# Patient Record
Sex: MALE | Race: WHITE | NOT HISPANIC OR LATINO
[De-identification: names, ages, dates, MRNs, and addresses within clinical notes are randomized per-mention and may not be internally consistent; named-entity substitution may affect disease eponyms.]

---

## 2018-01-09 ENCOUNTER — APPOINTMENT (OUTPATIENT)
Dept: INTERNAL MEDICINE | Facility: CLINIC | Age: 68
End: 2018-01-09
Payer: MEDICARE

## 2018-01-09 ENCOUNTER — NON-APPOINTMENT (OUTPATIENT)
Age: 68
End: 2018-01-09

## 2018-01-09 VITALS
SYSTOLIC BLOOD PRESSURE: 140 MMHG | OXYGEN SATURATION: 98 % | DIASTOLIC BLOOD PRESSURE: 80 MMHG | WEIGHT: 211.38 LBS | HEIGHT: 69 IN | BODY MASS INDEX: 31.31 KG/M2 | HEART RATE: 80 BPM

## 2018-01-09 DIAGNOSIS — R07.89 OTHER CHEST PAIN: ICD-10-CM

## 2018-01-09 DIAGNOSIS — Z28.21 IMMUNIZATION NOT CARRIED OUT BECAUSE OF PATIENT REFUSAL: ICD-10-CM

## 2018-01-09 DIAGNOSIS — Z71.89 OTHER SPECIFIED COUNSELING: ICD-10-CM

## 2018-01-09 PROCEDURE — G0439: CPT

## 2018-01-09 PROCEDURE — G0404: CPT

## 2018-01-09 PROCEDURE — 99497 ADVNCD CARE PLAN 30 MIN: CPT

## 2018-01-25 ENCOUNTER — OUTPATIENT (OUTPATIENT)
Dept: OUTPATIENT SERVICES | Facility: HOSPITAL | Age: 68
LOS: 1 days | End: 2018-01-25
Payer: MEDICARE

## 2018-01-25 DIAGNOSIS — Z96.642 PRESENCE OF LEFT ARTIFICIAL HIP JOINT: Chronic | ICD-10-CM

## 2018-01-25 DIAGNOSIS — H33.053 TOTAL RETINAL DETACHMENT, BILATERAL: Chronic | ICD-10-CM

## 2018-01-25 DIAGNOSIS — R07.89 OTHER CHEST PAIN: ICD-10-CM

## 2018-01-25 DIAGNOSIS — Z90.49 ACQUIRED ABSENCE OF OTHER SPECIFIED PARTS OF DIGESTIVE TRACT: Chronic | ICD-10-CM

## 2018-01-25 LAB
25(OH)D3 SERPL-MCNC: 31.9 NG/ML
ALBUMIN SERPL ELPH-MCNC: 4.3 G/DL
ALP BLD-CCNC: 91 U/L
ALT SERPL-CCNC: 40 U/L
ANION GAP SERPL CALC-SCNC: 15 MMOL/L
APPEARANCE: CLEAR
AST SERPL-CCNC: 40 U/L
BACTERIA: NEGATIVE
BASOPHILS # BLD AUTO: 0.03 K/UL
BASOPHILS NFR BLD AUTO: 0.5 %
BILIRUB SERPL-MCNC: 0.3 MG/DL
BILIRUBIN URINE: NEGATIVE
BLOOD URINE: NEGATIVE
BUN SERPL-MCNC: 18 MG/DL
CALCIUM SERPL-MCNC: 9.3 MG/DL
CHLORIDE SERPL-SCNC: 102 MMOL/L
CHOLEST SERPL-MCNC: 187 MG/DL
CHOLEST/HDLC SERPL: 4.8 RATIO
CO2 SERPL-SCNC: 21 MMOL/L
COLOR: YELLOW
CREAT SERPL-MCNC: 1.01 MG/DL
EOSINOPHIL # BLD AUTO: 0.1 K/UL
EOSINOPHIL NFR BLD AUTO: 1.8 %
GLUCOSE QUALITATIVE U: NEGATIVE MG/DL
GLUCOSE SERPL-MCNC: 112 MG/DL
HBA1C MFR BLD HPLC: 6.4 %
HCT VFR BLD CALC: 44.4 %
HDLC SERPL-MCNC: 39 MG/DL
HGB BLD-MCNC: 14.9 G/DL
IMM GRANULOCYTES NFR BLD AUTO: 0.4 %
KETONES URINE: NEGATIVE
LDLC SERPL CALC-MCNC: 108 MG/DL
LEUKOCYTE ESTERASE URINE: NEGATIVE
LYMPHOCYTES # BLD AUTO: 1.88 K/UL
LYMPHOCYTES NFR BLD AUTO: 33 %
MAN DIFF?: NORMAL
MCHC RBC-ENTMCNC: 30 PG
MCHC RBC-ENTMCNC: 33.6 GM/DL
MCV RBC AUTO: 89.3 FL
MICROSCOPIC-UA: NORMAL
MONOCYTES # BLD AUTO: 0.76 K/UL
MONOCYTES NFR BLD AUTO: 13.4 %
NEUTROPHILS # BLD AUTO: 2.9 K/UL
NEUTROPHILS NFR BLD AUTO: 50.9 %
NITRITE URINE: NEGATIVE
PH URINE: 5
PLATELET # BLD AUTO: 227 K/UL
POTASSIUM SERPL-SCNC: 4.5 MMOL/L
PROT SERPL-MCNC: 7.5 G/DL
PROTEIN URINE: NEGATIVE MG/DL
PSA SERPL-MCNC: 3.54 NG/ML
RBC # BLD: 4.97 M/UL
RBC # FLD: 13.4 %
RED BLOOD CELLS URINE: 1 /HPF
SODIUM SERPL-SCNC: 138 MMOL/L
SPECIFIC GRAVITY URINE: 1.02
SQUAMOUS EPITHELIAL CELLS: 0 /HPF
TRIGL SERPL-MCNC: 199 MG/DL
TSH SERPL-ACNC: 2.67 UIU/ML
UROBILINOGEN URINE: NEGATIVE MG/DL
WBC # FLD AUTO: 5.69 K/UL
WHITE BLOOD CELLS URINE: 2 /HPF

## 2018-01-25 PROCEDURE — 93017 CV STRESS TEST TRACING ONLY: CPT

## 2018-01-25 PROCEDURE — 93016 CV STRESS TEST SUPVJ ONLY: CPT

## 2018-01-25 PROCEDURE — 93018 CV STRESS TEST I&R ONLY: CPT

## 2018-09-13 ENCOUNTER — MEDICATION RENEWAL (OUTPATIENT)
Age: 68
End: 2018-09-13

## 2018-11-16 ENCOUNTER — INPATIENT (INPATIENT)
Facility: HOSPITAL | Age: 68
LOS: 2 days | Discharge: HOME | DRG: 312 | End: 2018-11-19
Attending: INTERNAL MEDICINE | Admitting: HOSPITALIST
Payer: MEDICARE

## 2018-11-16 ENCOUNTER — APPOINTMENT (OUTPATIENT)
Dept: INTERNAL MEDICINE | Facility: CLINIC | Age: 68
End: 2018-11-16

## 2018-11-16 VITALS
RESPIRATION RATE: 20 BRPM | TEMPERATURE: 97 F | HEART RATE: 60 BPM | WEIGHT: 184.97 LBS | SYSTOLIC BLOOD PRESSURE: 82 MMHG | DIASTOLIC BLOOD PRESSURE: 48 MMHG | OXYGEN SATURATION: 99 %

## 2018-11-16 DIAGNOSIS — Z90.49 ACQUIRED ABSENCE OF OTHER SPECIFIED PARTS OF DIGESTIVE TRACT: Chronic | ICD-10-CM

## 2018-11-16 DIAGNOSIS — Z96.641 PRESENCE OF RIGHT ARTIFICIAL HIP JOINT: Chronic | ICD-10-CM

## 2018-11-16 DIAGNOSIS — H33.053 TOTAL RETINAL DETACHMENT, BILATERAL: Chronic | ICD-10-CM

## 2018-11-16 DIAGNOSIS — E86.0 DEHYDRATION: ICD-10-CM

## 2018-11-16 DIAGNOSIS — R55 SYNCOPE AND COLLAPSE: ICD-10-CM

## 2018-11-16 DIAGNOSIS — I10 ESSENTIAL (PRIMARY) HYPERTENSION: ICD-10-CM

## 2018-11-16 DIAGNOSIS — Z96.642 PRESENCE OF LEFT ARTIFICIAL HIP JOINT: Chronic | ICD-10-CM

## 2018-11-16 LAB
ALBUMIN SERPL ELPH-MCNC: 3.5 G/DL — SIGNIFICANT CHANGE UP (ref 3.3–5)
ALP SERPL-CCNC: 88 U/L — SIGNIFICANT CHANGE UP (ref 40–120)
ALT FLD-CCNC: 67 U/L DA — HIGH (ref 10–45)
ANION GAP SERPL CALC-SCNC: 14 MMOL/L — SIGNIFICANT CHANGE UP (ref 5–17)
APPEARANCE UR: CLEAR — SIGNIFICANT CHANGE UP
APTT BLD: 31 SEC — SIGNIFICANT CHANGE UP (ref 27.5–36.3)
AST SERPL-CCNC: 32 U/L — SIGNIFICANT CHANGE UP (ref 10–40)
BACTERIA # UR AUTO: NEGATIVE /HPF — SIGNIFICANT CHANGE UP
BILIRUB SERPL-MCNC: 0.8 MG/DL — SIGNIFICANT CHANGE UP (ref 0.2–1.2)
BILIRUB UR-MCNC: ABNORMAL
BUN SERPL-MCNC: 37 MG/DL — HIGH (ref 7–23)
CALCIUM SERPL-MCNC: 8.4 MG/DL — SIGNIFICANT CHANGE UP (ref 8.4–10.5)
CHLORIDE SERPL-SCNC: 100 MMOL/L — SIGNIFICANT CHANGE UP (ref 96–108)
CK SERPL-CCNC: 255 U/L — HIGH (ref 30–200)
CO2 SERPL-SCNC: 23 MMOL/L — SIGNIFICANT CHANGE UP (ref 22–31)
COLOR SPEC: YELLOW — SIGNIFICANT CHANGE UP
CREAT SERPL-MCNC: 2.5 MG/DL — HIGH (ref 0.5–1.3)
DIFF PNL FLD: ABNORMAL
EPI CELLS # UR: SIGNIFICANT CHANGE UP
FLUAV SPEC QL CULT: NEGATIVE — SIGNIFICANT CHANGE UP
FLUBV AG SPEC QL IA: NEGATIVE — SIGNIFICANT CHANGE UP
GLUCOSE SERPL-MCNC: 146 MG/DL — HIGH (ref 70–99)
GLUCOSE UR QL: NEGATIVE — SIGNIFICANT CHANGE UP
HCT VFR BLD CALC: 43.7 % — SIGNIFICANT CHANGE UP (ref 39–50)
HGB BLD-MCNC: 14.5 G/DL — SIGNIFICANT CHANGE UP (ref 13–17)
HYALINE CASTS # UR AUTO: ABNORMAL (ref 0–7)
INR BLD: 1.17 RATIO — HIGH (ref 0.88–1.16)
KETONES UR-MCNC: ABNORMAL
LACTATE SERPL-SCNC: 2 MMOL/L — SIGNIFICANT CHANGE UP (ref 0.7–2)
LEUKOCYTE ESTERASE UR-ACNC: ABNORMAL
MCHC RBC-ENTMCNC: 29.2 PG — SIGNIFICANT CHANGE UP (ref 27–34)
MCHC RBC-ENTMCNC: 33.2 GM/DL — SIGNIFICANT CHANGE UP (ref 32–36)
MCV RBC AUTO: 87.9 FL — SIGNIFICANT CHANGE UP (ref 80–100)
NITRITE UR-MCNC: NEGATIVE — SIGNIFICANT CHANGE UP
NT-PROBNP SERPL-SCNC: 459 PG/ML — HIGH (ref 0–300)
PH UR: 5 — SIGNIFICANT CHANGE UP (ref 5–8)
PLATELET # BLD AUTO: 152 K/UL — SIGNIFICANT CHANGE UP (ref 150–400)
POTASSIUM SERPL-MCNC: 3.8 MMOL/L — SIGNIFICANT CHANGE UP (ref 3.5–5.3)
POTASSIUM SERPL-SCNC: 3.8 MMOL/L — SIGNIFICANT CHANGE UP (ref 3.5–5.3)
PROT SERPL-MCNC: 7.5 G/DL — SIGNIFICANT CHANGE UP (ref 6–8.3)
PROT UR-MCNC: 100
PROTHROM AB SERPL-ACNC: 13.2 SEC — HIGH (ref 10–12.9)
RBC # BLD: 4.98 M/UL — SIGNIFICANT CHANGE UP (ref 4.2–5.8)
RBC # FLD: 12.3 % — SIGNIFICANT CHANGE UP (ref 10.3–14.5)
RBC CASTS # UR COMP ASSIST: ABNORMAL /HPF (ref 0–4)
SODIUM SERPL-SCNC: 137 MMOL/L — SIGNIFICANT CHANGE UP (ref 135–145)
SP GR SPEC: 1.02 — SIGNIFICANT CHANGE UP (ref 1.01–1.02)
TROPONIN I SERPL-MCNC: <.017 NG/ML — LOW (ref 0.02–0.06)
TROPONIN I SERPL-MCNC: <.017 NG/ML — LOW (ref 0.02–0.06)
UROBILINOGEN FLD QL: 1
WBC # BLD: 13.1 K/UL — HIGH (ref 3.8–10.5)
WBC # FLD AUTO: 13.1 K/UL — HIGH (ref 3.8–10.5)
WBC UR QL: SIGNIFICANT CHANGE UP /HPF (ref 0–5)

## 2018-11-16 PROCEDURE — 99285 EMERGENCY DEPT VISIT HI MDM: CPT

## 2018-11-16 PROCEDURE — 71045 X-RAY EXAM CHEST 1 VIEW: CPT | Mod: 26

## 2018-11-16 PROCEDURE — 93010 ELECTROCARDIOGRAM REPORT: CPT

## 2018-11-16 PROCEDURE — 70450 CT HEAD/BRAIN W/O DYE: CPT | Mod: 26

## 2018-11-16 PROCEDURE — 99222 1ST HOSP IP/OBS MODERATE 55: CPT | Mod: AI

## 2018-11-16 RX ORDER — SODIUM CHLORIDE 9 MG/ML
1000 INJECTION INTRAMUSCULAR; INTRAVENOUS; SUBCUTANEOUS ONCE
Qty: 0 | Refills: 0 | Status: COMPLETED | OUTPATIENT
Start: 2018-11-16 | End: 2018-11-16

## 2018-11-16 RX ORDER — CEFTRIAXONE 500 MG/1
1 INJECTION, POWDER, FOR SOLUTION INTRAMUSCULAR; INTRAVENOUS ONCE
Qty: 0 | Refills: 0 | Status: COMPLETED | OUTPATIENT
Start: 2018-11-16 | End: 2018-11-16

## 2018-11-16 RX ORDER — CEFTRIAXONE 500 MG/1
1 INJECTION, POWDER, FOR SOLUTION INTRAMUSCULAR; INTRAVENOUS EVERY 24 HOURS
Qty: 0 | Refills: 0 | Status: DISCONTINUED | OUTPATIENT
Start: 2018-11-16 | End: 2018-11-17

## 2018-11-16 RX ORDER — LISINOPRIL 2.5 MG/1
5 TABLET ORAL DAILY
Qty: 0 | Refills: 0 | Status: DISCONTINUED | OUTPATIENT
Start: 2018-11-16 | End: 2018-11-16

## 2018-11-16 RX ORDER — INFLUENZA VIRUS VACCINE 15; 15; 15; 15 UG/.5ML; UG/.5ML; UG/.5ML; UG/.5ML
0.5 SUSPENSION INTRAMUSCULAR ONCE
Qty: 0 | Refills: 0 | Status: COMPLETED | OUTPATIENT
Start: 2018-11-16 | End: 2018-11-19

## 2018-11-16 RX ORDER — SODIUM CHLORIDE 9 MG/ML
1000 INJECTION INTRAMUSCULAR; INTRAVENOUS; SUBCUTANEOUS
Qty: 0 | Refills: 0 | Status: DISCONTINUED | OUTPATIENT
Start: 2018-11-16 | End: 2018-11-18

## 2018-11-16 RX ORDER — ENOXAPARIN SODIUM 100 MG/ML
40 INJECTION SUBCUTANEOUS EVERY 24 HOURS
Qty: 0 | Refills: 0 | Status: DISCONTINUED | OUTPATIENT
Start: 2018-11-16 | End: 2018-11-19

## 2018-11-16 RX ORDER — BACITRACIN ZINC 500 UNIT/G
1 OINTMENT IN PACKET (EA) TOPICAL THREE TIMES A DAY
Qty: 0 | Refills: 0 | Status: DISCONTINUED | OUTPATIENT
Start: 2018-11-16 | End: 2018-11-19

## 2018-11-16 RX ADMIN — ENOXAPARIN SODIUM 40 MILLIGRAM(S): 100 INJECTION SUBCUTANEOUS at 21:54

## 2018-11-16 RX ADMIN — SODIUM CHLORIDE 2000 MILLILITER(S): 9 INJECTION INTRAMUSCULAR; INTRAVENOUS; SUBCUTANEOUS at 15:35

## 2018-11-16 RX ADMIN — CEFTRIAXONE 100 GRAM(S): 500 INJECTION, POWDER, FOR SOLUTION INTRAMUSCULAR; INTRAVENOUS at 16:00

## 2018-11-16 RX ADMIN — SODIUM CHLORIDE 1000 MILLILITER(S): 9 INJECTION INTRAMUSCULAR; INTRAVENOUS; SUBCUTANEOUS at 14:50

## 2018-11-16 RX ADMIN — Medication 1 APPLICATION(S): at 21:54

## 2018-11-16 RX ADMIN — SODIUM CHLORIDE 1000 MILLILITER(S): 9 INJECTION INTRAMUSCULAR; INTRAVENOUS; SUBCUTANEOUS at 15:05

## 2018-11-16 RX ADMIN — Medication 100 MILLIGRAM(S): at 16:31

## 2018-11-16 RX ADMIN — SODIUM CHLORIDE 2000 MILLILITER(S): 9 INJECTION INTRAMUSCULAR; INTRAVENOUS; SUBCUTANEOUS at 14:35

## 2018-11-16 RX ADMIN — SODIUM CHLORIDE 2000 MILLILITER(S): 9 INJECTION INTRAMUSCULAR; INTRAVENOUS; SUBCUTANEOUS at 14:20

## 2018-11-16 RX ADMIN — SODIUM CHLORIDE 1000 MILLILITER(S): 9 INJECTION INTRAMUSCULAR; INTRAVENOUS; SUBCUTANEOUS at 16:05

## 2018-11-16 RX ADMIN — CEFTRIAXONE 1 GRAM(S): 500 INJECTION, POWDER, FOR SOLUTION INTRAMUSCULAR; INTRAVENOUS at 16:30

## 2018-11-16 NOTE — ED PROVIDER NOTE - ATTENDING CONTRIBUTION TO CARE
· Chief Complaint: The patient is a 44y Female complaining of abnormal lab result.  · HPI Objective Statement: 45 y/o F with h/o Thrombocytopenia, Pancreatic CA undergoing chemo presenting with chest pain-  did a cta chest done today revealing (+) pulmonary embolism. Denies abdominal pain, nausea, vomiting, fever, chills, shortness of breath. Does states recent nose bleed. Appears well. No distress.  	PMD- Lindy  Heme/Onc- Star    · Presenting Symptoms: CHEST PAIN  · Negative Findings: no back pain  · Location: sternal region  · Radiation: no radiation  · Timing: waxing and waning  · Duration: today  · Quality: aching  · Severity: PAIN SCALE 7 OF 10.  · Context: hx of pancreatic cancer  · Aggravated Factors: exertion  · Relieving Factors: rest  PE pt looked well NAD , bp recorded below normal mm dry   chest cta bilaterally abd soft NT + BS   Dr. Casper:  I have reviewed and discussed with the PA/ resident the case specifics, including the history, physical assessment, evaluation, conclusion, laboratory results, and medical plan. I agree with the contents, and conclusions. I have personally examined, and interviewed the patient. cc syncopy three times in pmd office , bp recorded low in ed  PE pt looked well NAD , bp recorded below normal mm dry   chest cta bilaterally abd soft NT + BS   Dr. Casper:  I have reviewed and discussed with the PA/ resident the case specifics, including the history, physical assessment, evaluation, conclusion, laboratory results, and medical plan. I agree with the contents, and conclusions. I have personally examined, and interviewed the patient.

## 2018-11-16 NOTE — H&P ADULT - PROBLEM SELECTOR PLAN 1
F/U head CT results  Monitor on telemetry for arrhythmias  Cardiology and neuro evaluation pending  check Troponins x 3, echocardiogram, carotid sono R/O stenosis

## 2018-11-16 NOTE — ED PROVIDER NOTE - MEDICAL DECISION MAKING DETAILS
69 y/o M with h/o HTN, B/L hip replacements presents s/p multiple syncopal episodes today accompanied with cough, "high" fever, sleeping and not eating and drinking x 2 days- Cardiac work up, sepsis work up, Flu swab, Labs with lactate, Troponin, Cardiac monitoring, EKG, CXr, UA Urine culture, Admit

## 2018-11-16 NOTE — H&P ADULT - NSHPLABSRESULTS_GEN_ALL_CORE
14.5   13.1  )-----------( 152      ( 2018 14:15 )             43.7     Lactate, Blood: 2.0 mmol/L ( @ 14:15)        137  |  100  |  37  ----------------------------<  146  3.8   |  23  |  2.50    Ca    8.4      2018 14:15    TPro  7.5  /  Alb  3.5  /  TBili  0.8  /  DBili  x   /  AST  32  /  ALT  67  /  AlkPhos  88      PT/INR - ( 2018 14:15 )   PT: 13.2 sec;   INR: 1.17 ratio         PTT - ( 2018 14:15 )  PTT:31.0 sec  CARDIAC MARKERS ( 2018 14:15 )  <.017 ng/mL / x     / 255 U/L / x     / x                  Urinalysis Basic - ( 2018 16:15 )    Color: Yellow / Appearance: Clear / S.025 / pH: x  Gluc: x / Ketone: Small  / Bili: Small / Urobili: 1   Blood: x / Protein: 100 / Nitrite: Negative   Leuk Esterase: Trace / RBC: 5-10 /HPF / WBC 0-2 /HPF   Sq Epi: x / Non Sq Epi: Neg.-Few / Bacteria: Negative /HPF

## 2018-11-16 NOTE — H&P ADULT - ASSESSMENT
68 year-old male with PMH: HTN. Admitted with syncopal episodes x3 and one near syncopal episode. He reports sick contact with a friend whom he visited several times in a nursing/ rehabilitation facility over the past week prior. He reports having no heat in the house for a few days and when he woke up Wednesday morning (11/14) he felt feverish, fatigue, and was bedridden since then. He reports decreased appetite and felt dehydrated.

## 2018-11-16 NOTE — H&P ADULT - PSH
H/O total hip arthroplasty, right  Right hip replacement 2010  Recent retinal detachment, total, bilateral  1989 (left), 2006 (right)  S/P appendectomy    S/P hip replacement, left  2006

## 2018-11-16 NOTE — ED ADULT TRIAGE NOTE - CHIEF COMPLAINT QUOTE
syncope at home today x 2, went to Dr. Reyes office and synopsized in the parking lot.  sent to ED by Dr. Reyes

## 2018-11-16 NOTE — ED ADULT NURSE NOTE - OBJECTIVE STATEMENT
Pt presents via EMS from Dr. Reyes office with c/o 4 syncopal episodes today starting this morning all witnessed by wife. Per wife pt was sick with a fever and body aches since wednesday and was not eating or drinking fluids. Wife states that pt's falls were broken but that pt may have hit head and lost consciousness for 5-10 seconds each episode. Pt presents alert and oriented and states that he does not remember passing out. Per the wife pt had synopsized in the past.

## 2018-11-16 NOTE — H&P ADULT - ATTENDING COMMENTS
I have personally seen and examined patient on the above date.  I discussed the case with Lisa and I agree with findings and plan as detailed per note above, which I have amended where appropriate.    68M hx of HTN with febrile illness for the past 3 days with shaking chills, bedridden, no appetite s/p 4 syncopal episodes today while on way to PMD arrived in ED hypotensive resolved after IVFs. Pt feels well now. no further dizziness. tolerating po. Denied cough, sob, cp, nvd or dysuria. Noted pt had cont with Ramipril during febrile illness. no recent NSAIDs  VSS afebrile.   AP: syncope/collapse likely sec to dehydration, febrile illness with sepsis and YINKA  +wbc, +hypotension, +YINKA fulfills criteria for sepsis. CXR/UA neg  cont IV ceftriaxone for now until cxs flu/RVP resulted  agree with trend trops, ECHO, card/neuro to be complete  IVF for YINKA and dehydration. HOLD ACE until YINKA resolves. renal sono to be complete.

## 2018-11-16 NOTE — ED ADULT TRIAGE NOTE - PRO INTERPRETER NEED 2
Dx: L Glenohumeral arthritis, left (M19.012)  TSA 11/30/2017        Authorized # of Visits: Maida Powell MD visit: 02/26/2018  Fall Risk: standard         Precautions: n/a             Subjective: Patient states that he was unable to attend last week's s Shoulder PROM L shoulder abd (scaption) Shoulder PROM L shoulder abd (scaption)  PROM L shoulder abd (scaption) PROM L shoulder abd (scaption) Wall push up 2 x 10     ER in scapular plane PROM PROM ER in scapular plane PROM ER in scapular plane PROM L shou to increase passive range of motion in preparation for AAROM to AROM and RROM per TSA protocol, verbal cues to keep B arms at side during scapular retraction, B shoulder mini-extension and L shoulder ER at 0 deg. Charges:  Thera Ex 3 (45 min)        Tota English

## 2018-11-16 NOTE — H&P ADULT - HISTORY OF PRESENT ILLNESS
68 year-old male with PMH: HTN. Presents to the ED with multiple syncopal episodes. As per significant other-Cinthya, she witnessed pt hit his head in at least one episode and an other where she found him flat on the ground. Pt with recent fever and fatigue over last 2-3 days. Pt with no chest pain, palpitations dizziness, headaches prior to these episodes.

## 2018-11-16 NOTE — ED PROVIDER NOTE - OBJECTIVE STATEMENT
69 y/o M with h/o HTn B/L hip replacements presents s/p multiple syncopal episodes today.  Accompanied with wife who states patient has had a cough, "high" fever, sleeping and not eating and drinking x 2 days.  Stood up from bed this am and collapsed in front of the wife unconscious for 5 seconds no shaking. Then got up and walked to the bathroom "falling back into the door" and falling to the floor unconscious for 10-15 seconds no shaking. Put patient into the car and drove to PMD Dr. Reyes- as patient got out fo car in parking a lot again collapsed unconscious for 10 seconds with a "tremoring" sensation as per wife. Wife states prior episodes of syncope in "queezy situations" such as procedures.  Currently patient denies chest pain, shortness of breath, vomiting, numbness, tingling. No distress.   Allergy: Coumadin (increased bleeding)  No smoking hx 67 y/o M with h/o HTN B/L hip replacements presents s/p multiple witnessed syncopal episodes today.  Accompanied with wife who states patient has had a cough, "high" fever, sleeping and not eating and drinking x 2 days.  Stood up from bed this am and collapsed in front of the wife unconscious for 5 seconds no shaking. Then got up and walked to the bathroom "falling back into the door" and falling to the floor unconscious for 10-15 seconds no shaking. Put patient into the car and drove to PMD Dr. Reyes- as patient got out fo car in parking a lot again collapsed unconscious for 10 seconds with a "tremoring" sensation as per wife. Wife states prior episodes of syncope in "queezy situations" such as procedures.  Currently patient denies chest pain, shortness of breath, vomiting, numbness, tingling. No distress. Awake and alert answering question appropriately. No distress. Did not get a flu shot this year.   Allergy: Coumadin (increased bleeding)  No smoking hx

## 2018-11-16 NOTE — ED ADULT NURSE NOTE - NSIMPLEMENTINTERV_GEN_ALL_ED
Implemented All Fall Risk Interventions:  [place] to call system. Call bell, personal items and telephone within reach. Instruct patient to call for assistance. Room bathroom lighting operational. Non-slip footwear when patient is off stretcher. Physically safe environment: no spills, clutter or unnecessary equipment. Stretcher in lowest position, wheels locked, appropriate side rails in place. Provide visual cue, wrist band, yellow gown, etc. Monitor gait and stability. Monitor for mental status changes and reorient to person, place, and time. Review medications for side effects contributing to fall risk. Reinforce activity limits and safety measures with patient and family.

## 2018-11-16 NOTE — ED PROVIDER NOTE - CARE PLAN
Principal Discharge DX:	Syncope and collapse  Secondary Diagnosis:	Viral syndrome  Secondary Diagnosis:	Dehydration

## 2018-11-16 NOTE — H&P ADULT - NSHPREVIEWOFSYSTEMS_GEN_ALL_CORE
REVIEW OF SYSTEMS:  CONSTITUTIONAL: +fever, no weight loss, +fatigue, + dehydrated, + shaking shakes  EYES: No eye pain, visual disturbances, or discharge  ENMT:  No difficulty hearing, tinnitus, vertigo; No sinus or throat pain  NECK: No neck pain or neck stiffness  BREASTS: No pain, masses, or nipple discharge  RESPIRATORY: No cough, wheezing, chills or hemoptysis; No shortness of breath  CARDIOVASCULAR: No chest pain, palpitations, dizziness, or leg swelling  GASTROINTESTINAL: No abdominal pain, No nausea, vomiting, or hematemesis; No diarrhea or constipation  GENITOURINARY: No dysuria, frequency, hematuria, or incontinence  NEUROLOGICAL: No headaches, memory loss, loss of strength, numbness, or tremors  SKIN: No itching, burning, rashes, or lesions   LYMPH NODES: No enlarged glands  ENDOCRINE: No heat or cold intolerance; No hair loss  MUSCULOSKELETAL: No joint pain or swelling; No muscle, back, or extremity pain  PSYCHIATRIC: No depression, anxiety, mood swings, or difficulty sleeping  HEME/LYMPH: No easy bruising or bleeding  ALLERY AND IMMUNOLOGIC: No hives or eczema    All other ROS reviewed and negative except as otherwise stated

## 2018-11-16 NOTE — ED ADULT NURSE NOTE - PMH
Glaucoma    Hip pain, right    Hypertension    Macular degeneration of both eyes    Retinal detachments and defects  bilateral

## 2018-11-16 NOTE — ED ADULT NURSE NOTE - CHPI ED NUR SYMPTOMS NEG
no diaphoresis/no back pain/no chest pain/no congestion/no dizziness/no vomiting/no nausea/no shortness of breath

## 2018-11-16 NOTE — ED PROVIDER NOTE - PROGRESS NOTE DETAILS
PA Tiberio- rapid flu negative. CXR no pneumonia. WBC 13.1. Trop negative.  Creatinine elevated 2.5 likely related to dehydration. Covered with Rocephin and Doxy and given NS x 3 liters. Will admit for syncope work up. Accepted by hospitalist Dr. Thomas.

## 2018-11-16 NOTE — H&P ADULT - NSHPPHYSICALEXAM_GEN_ALL_CORE
PHYSICAL EXAM:  GENERAL: NAD, well-groomed, well-developed, AO X 3  HEAD:  Atraumatic, Normocephalic  EYES: EOMI, PERRLA, conjunctiva and sclera clear  ENMT: No tonsillar erythema, exudates, or enlargement; Moist mucous membranes, Good dentition  NECK: Supple, No JVD  CHEST/LUNG: Clear to auscultation bilaterally; No rales, rhonchi, wheezing, or rubs  HEART: Regular rate and rhythm; S1/S2, No murmurs, rubs, or gallops  ABDOMEN: Soft, Nontender, Nondistended; Bowel sounds present  VASCULAR: Normal pulses, Normal capillary refill  EXTREMITIES:  2+ Peripheral Pulses, No clubbing, cyanosis, or edema  LYMPH: No lymphadenopathy noted  SKIN: Warm, right elbow open bleeding abrasion, no swelling  PSYCH: Normal mood and affect  NERVOUS SYSTEM; CN 2-12 intact, No focal deficits

## 2018-11-17 LAB
ANION GAP SERPL CALC-SCNC: 7 MMOL/L — SIGNIFICANT CHANGE UP (ref 5–17)
BUN SERPL-MCNC: 25 MG/DL — HIGH (ref 7–23)
CALCIUM SERPL-MCNC: 7.8 MG/DL — LOW (ref 8.4–10.5)
CHLORIDE SERPL-SCNC: 110 MMOL/L — HIGH (ref 96–108)
CO2 SERPL-SCNC: 22 MMOL/L — SIGNIFICANT CHANGE UP (ref 22–31)
CREAT SERPL-MCNC: 1.05 MG/DL — SIGNIFICANT CHANGE UP (ref 0.5–1.3)
GLUCOSE SERPL-MCNC: 113 MG/DL — HIGH (ref 70–99)
HCT VFR BLD CALC: 36.6 % — LOW (ref 39–50)
HGB BLD-MCNC: 12.6 G/DL — LOW (ref 13–17)
LACTATE SERPL-SCNC: 2.3 MMOL/L — HIGH (ref 0.7–2)
MCHC RBC-ENTMCNC: 30.6 PG — SIGNIFICANT CHANGE UP (ref 27–34)
MCHC RBC-ENTMCNC: 34.6 GM/DL — SIGNIFICANT CHANGE UP (ref 32–36)
MCV RBC AUTO: 88.6 FL — SIGNIFICANT CHANGE UP (ref 80–100)
PLATELET # BLD AUTO: 128 K/UL — LOW (ref 150–400)
POTASSIUM SERPL-MCNC: 3.6 MMOL/L — SIGNIFICANT CHANGE UP (ref 3.5–5.3)
POTASSIUM SERPL-SCNC: 3.6 MMOL/L — SIGNIFICANT CHANGE UP (ref 3.5–5.3)
RBC # BLD: 4.12 M/UL — LOW (ref 4.2–5.8)
RBC # FLD: 12.2 % — SIGNIFICANT CHANGE UP (ref 10.3–14.5)
SODIUM SERPL-SCNC: 139 MMOL/L — SIGNIFICANT CHANGE UP (ref 135–145)
TROPONIN I SERPL-MCNC: <.017 NG/ML — LOW (ref 0.02–0.06)
WBC # BLD: 8 K/UL — SIGNIFICANT CHANGE UP (ref 3.8–10.5)
WBC # FLD AUTO: 8 K/UL — SIGNIFICANT CHANGE UP (ref 3.8–10.5)

## 2018-11-17 PROCEDURE — 76775 US EXAM ABDO BACK WALL LIM: CPT | Mod: 26

## 2018-11-17 PROCEDURE — 76770 US EXAM ABDO BACK WALL COMP: CPT | Mod: 26

## 2018-11-17 PROCEDURE — 99222 1ST HOSP IP/OBS MODERATE 55: CPT

## 2018-11-17 PROCEDURE — 93010 ELECTROCARDIOGRAM REPORT: CPT

## 2018-11-17 PROCEDURE — 93306 TTE W/DOPPLER COMPLETE: CPT | Mod: 26

## 2018-11-17 PROCEDURE — 93880 EXTRACRANIAL BILAT STUDY: CPT | Mod: 26

## 2018-11-17 PROCEDURE — 99233 SBSQ HOSP IP/OBS HIGH 50: CPT

## 2018-11-17 RX ADMIN — ENOXAPARIN SODIUM 40 MILLIGRAM(S): 100 INJECTION SUBCUTANEOUS at 21:15

## 2018-11-17 RX ADMIN — Medication 1 APPLICATION(S): at 06:03

## 2018-11-17 RX ADMIN — Medication 1 APPLICATION(S): at 21:15

## 2018-11-17 NOTE — CONSULT NOTE ADULT - SUBJECTIVE AND OBJECTIVE BOX
Chief Complaint:     68 year-old male with PMH: HTN. Presents to the ED with multiple syncopal episodes. As per significant other-Cinthya, she witnessed pt hit his head in at least one episode and an other where she found him flat on the ground. Pt with recent fever and fatigue over last 2-3 days. Pt with no chest pain, palpitations dizziness, headaches prior to these episodes.  HPI:    PMH:   Macular degeneration of both eyes  Retinal detachments and defects  Glaucoma  Hypertension  Hip pain, right    PSH:   H/O total hip arthroplasty, right  S/P appendectomy  Recent retinal detachment, total, bilateral  S/P hip replacement, left    Family History:  FAMILY HISTORY:  Family history of breast cancer in mother (Mother)      Social History:  Smoking:  Alcohol:  Drugs:    Allergies:  Coumadin (Other (Mod to Severe); [place]ushing)      Medications:  BACItracin   Ointment 1 Application(s) Topical three times a day  enoxaparin Injectable 40 milliGRAM(s) SubCutaneous every 24 hours  influenza   Vaccine 0.5 milliLiter(s) IntraMuscular once  sodium chloride 0.9%. 1000 milliLiter(s) IV Continuous <Continuous>      REVIEW OF SYSTEMS:  CONSTITUTIONAL: No fever, weight loss, or fatigue  EYES: No eye pain, visual disturbances, or discharge  ENMT:  No difficulty hearing, tinnitus, vertigo; No sinus or throat pain  NECK: No pain or stiffness  BREASTS: No pain, masses, or nipple discharge  RESPIRATORY: No cough, wheezing, chills or hemoptysis; No shortness of breath  CARDIOVASCULAR: No chest pain, palpitations, dizziness, or leg swelling  GASTROINTESTINAL: No abdominal or epigastric pain. No nausea, vomiting, or hematemesis; No diarrhea or constipation. No melena or hematochezia.  GENITOURINARY: No dysuria, frequency, hematuria, or incontinence  NEUROLOGICAL: No headaches, memory loss, loss of strength, numbness, or tremors  SKIN: No itching, burning, rashes, or lesions   LYMPH NODES: No enlarged glands  ENDOCRINE: No heat or cold intolerance; No hair loss  MUSCULOSKELETAL: No joint pain or swelling; No muscle, back, or extremity pain  PSYCHIATRIC: No depression, anxiety, mood swings, or difficulty sleeping  HEME/LYMPH: No easy bruising, or bleeding gums  ALLERY AND IMMUNOLOGIC: No hives or eczema    Physical Exam:  T(C): 36.4 (11-17-18 @ 09:38), Max: 36.5 (11-16-18 @ 16:16)  HR: 69 (11-17-18 @ 09:38) (60 - 71)  BP: 155/77 (11-17-18 @ 09:38) (82/48 - 155/78)  RR: 15 (11-17-18 @ 09:38) (15 - 20)  SpO2: 97% (11-17-18 @ 09:38) (97% - 99%)  Wt(kg): --    GENERAL: NAD, well-groomed, well-developed appears stated age  HEAD:  Atraumatic, Normocephalic  EYES: EOMI, conjunctiva and sclera clear  ENT: Moist mucous membranes,  NECK: Supple, No JVD, no bruits  CHEST/LUNG: Clear to percussion bilaterally; No rales, rhonchi, wheezing, or rubs  HEART: Regular rate and rhythm; No murmurs, rubs, or gallops PMI non displaced.  ABDOMEN: Soft, Nontender, Nondistended; Bowel sounds present  EXTREMITIES:  2+ Peripheral Pulses, No clubbing, cyanosis, or edema  SKIN: No rashes or lesions  NERVOUS SYSTEM:  Cranial Nerves II-XII intact     Cardiovascular Diagnostic Testing:  ECG:    < from: 12 Lead ECG (11.17.18 @ 06:55) >  Ventricular Rate 66 BPM    Atrial Rate 66 BPM    P-R Interval 216 ms    QRS Duration 100 ms    Q-T Interval 402 ms    QTC Calculation(Bezet) 421 ms    P Axis 58 degrees    R Axis 96 degrees    T Axis 14 degrees    Diagnosis Line Sinus rhythm with 1st degree AV block  Rightward axis  Nonspecific ST abnormality  Borderline ECG  When compared with ECG of 16-NOV-2018 14:00,  No significant change was found    Confirmed by LAI MAXWELL MD ([id number]) on 11/17/2018 8:31:39 AM    < end of copied text >      ECHO:    < from: TTE Echo Complete w/Doppler (11.17.18 @ 09:08) >    Summary:   1. Left ventricular ejection fraction, by visual estimation, is 55 to   60%.   2. Normal global left ventricular systolic function.   3. Spectral Doppler shows pseudonormal pattern of left ventricular   myocardial filling (Grade II diastolic dysfunction).   4. There is mild septal left ventricular hypertrophy.   5. Moderately enlarged right ventricle.   6. There is no evidence of pericardial effusion.   7. Thickening of the anterior and posterior mitral valve leaflets.   8. Mild tricuspid regurgitation.   9. Sclerotic aortic valve with normal opening.  10. Pulmonic valve regurgitation.  11. Structurally normal pulmonic valve, with normal leaflet excursion.  12. Estimated pulmonary artery systolic pressure is 55.5 mmHg assuming a   right atrial pressure of 10 mmHg, which is consistent with moderate   pulmonary hypertension.  13. The aortic valve mean gradient is 5.6 mmHg consistent with normally   openingaortic valve.    [id number] Lai Maxwell MD, [facility] , Electronically signed on 11/17/2018 at   11:52:06 AM       *** Final ***      LAI MAXWELL   This document has been electronically signed. Nov 17 2018  9:08AM    < end of copied text >      Labs:                        12.6   8.0   )-----------( 128      ( 17 Nov 2018 06:00 )             36.6     11-17    139  |  110<H>  |  25<H>  ----------------------------<  113<H>  3.6   |  22  |  1.05    Ca    7.8<L>      17 Nov 2018 06:00    TPro  7.5  /  Alb  3.5  /  TBili  0.8  /  DBili  x   /  AST  32  /  ALT  67<H>  /  AlkPhos  88  11-16    PT/INR - ( 16 Nov 2018 14:15 )   PT: 13.2 sec;   INR: 1.17 ratio         PTT - ( 16 Nov 2018 14:15 )  PTT:31.0 sec  CARDIAC MARKERS ( 17 Nov 2018 00:10 )  <.017 ng/mL / x     / x     / x     / x      CARDIAC MARKERS ( 16 Nov 2018 20:20 )  <.017 ng/mL / x     / x     / x     / x      CARDIAC MARKERS ( 16 Nov 2018 14:15 )  <.017 ng/mL / x     / 255 U/L / x     / x          Serum Pro-Brain Natriuretic Peptide: 459 pg/mL (11-16 @ 14:15)      Imaging:    < from: Xray Chest 1 View AP/PA (11.16.18 @ 15:08) >    EXAM:  XR CHEST AP OR PA 1V      PROCEDURE DATE:  11/16/2018        INTERPRETATION:  Chest radiograph (one view)     CPT 60968    CLINICAL INFORMATION:       Chest pain of unknown severity or duration.    TECHNIQUE:  Single frontal view of the chest was obtained.    FINDINGS:  Prior study dated 5/25/2008 was available for review.    The lungs are clear.  No pleural abnormality is seen.      The heart and mediastinum appear intact.           IMPRESSION: No evidence of active chest disease.        VI ROSE M.D., ATTENDING RADIOLOGIST  This document has been electronically signed. Nov 16 2018  3:21PM    < end of copied text >

## 2018-11-17 NOTE — CONSULT NOTE ADULT - ATTENDING COMMENTS
recurrent syncope in the setting of medical illness  etiology is unclear however dehydration vagotonia and sepsis lactate 2.3 anemia hb 12.6 physiology are likely contributory. would monitor as you are doing and treat medical illness.

## 2018-11-17 NOTE — PROGRESS NOTE ADULT - SUBJECTIVE AND OBJECTIVE BOX
Patient is a 68y old  Male who presents with a chief complaint of Fever for 2-3 days, bedridden. Syncopal episodes x 3 and 1 near syncope (2018 18:17)      Patient seen and examined at bedside.    ALLERGIES:  Coumadin (Other (Mod to Severe); Flushing)    MEDICATIONS:  BACItracin   Ointment 1 Application(s) Topical three times a day  cefTRIAXone   IVPB 1 Gram(s) IV Intermittent every 24 hours  enoxaparin Injectable 40 milliGRAM(s) SubCutaneous every 24 hours  influenza   Vaccine 0.5 milliLiter(s) IntraMuscular once  sodium chloride 0.9%. 1000 milliLiter(s) IV Continuous <Continuous>    Vital Signs Last 24 Hrs  T(F): 97.6 (2018 09:38), Max: 97.7 (2018 16:16)  HR: 69 (2018 09:38) (60 - 71)  BP: 155/77 (2018 09:38) (82/48 - 155/78)  RR: 15 (2018 09:38) (15 - 20)  SpO2: 97% (2018 09:38) (97% - 99%)  I&O's Summary    2018 07:01  -  2018 07:00  --------------------------------------------------------  IN: 0 mL / OUT: 3 mL / NET: -3 mL        PHYSICAL EXAM:  General: NAD, A/O x 3  ENT: MMM  Neck: Supple, No JVD  Lungs: Clear to auscultation bilaterally  Cardio: RRR, S1/S2, No murmurs   Abdomen: Soft, Nontender, Nondistended; Bowel sounds present  Extremities: No cyanosis, No edema    LABS:                        12.6   8.0   )-----------( 128      ( 2018 06:00 )             36.6     11-17    139  |  110  |  25  ----------------------------<  113  3.6   |  22  |  1.05    Ca    7.8      2018 06:00    TPro  7.5  /  Alb  3.5  /  TBili  0.8  /  DBili  x   /  AST  32  /  ALT  67  /  AlkPhos  88      eGFR if Non African American: 71 mL/min/1.73M2 (18 @ 06:00)  eGFR if : 83 mL/min/1.73M2 (18 @ 06:00)    PT/INR - ( 2018 14:15 )   PT: 13.2 sec;   INR: 1.17 ratio         PTT - ( 2018 14:15 )  PTT:31.0 sec  Lactate, Blood: 2.0 mmol/L ( @ 14:15)    CARDIAC MARKERS ( 2018 00:10 )  <.017 ng/mL / x     / x     / x     / x      CARDIAC MARKERS ( 2018 20:20 )  <.017 ng/mL / x     / x     / x     / x      CARDIAC MARKERS ( 2018 14:15 )  <.017 ng/mL / x     / 255 U/L / x     / x                  CAPILLARY BLOOD GLUCOSE          Urinalysis Basic - ( 2018 16:15 )    Color: Yellow / Appearance: Clear / S.025 / pH: x  Gluc: x / Ketone: Small  / Bili: Small / Urobili: 1   Blood: x / Protein: 100 / Nitrite: Negative   Leuk Esterase: Trace / RBC: 5-10 /HPF / WBC 0-2 /HPF   Sq Epi: x / Non Sq Epi: Neg.-Few / Bacteria: Negative /HPF          RADIOLOGY & ADDITIONAL TESTS:  < from: CT Head No Cont (18 @ 15:25) >    Impression:  1. Unremarkable noncontrast CT scan of the brain.        < end of copied text >  < from: Xray Chest 1 View AP/PA (18 @ 15:08) >      IMPRESSION: No evidence of active chest disease.    < end of copied text >    Care Discussed with Consultants/Other Providers:

## 2018-11-17 NOTE — PROGRESS NOTE ADULT - ASSESSMENT
67 yo m admitted 11/16/18 presents with multiple syncopal episodes    1. syncope: 3 neg tni, echo pending. F/u cardio consult.   2. leukocytosis resolved rvp r/o d/c isolation. repeat lactate d/c abx f/u blood cultures. UA NEG   3. reji resolved  4. essential htn: acei on hold consider norvasc if bp remains elevated  5. dvt ppx: lovenox  6. dispo: PT consult placed

## 2018-11-17 NOTE — PHYSICAL THERAPY INITIAL EVALUATION ADULT - ADDITIONAL COMMENTS
Pt lives in private house with wife, 3 [address] with a rail, 13 stairs inside with 1 rail.  Pt is independent with all activities, drives and does volunteer work.

## 2018-11-18 LAB
-  AMIKACIN: SIGNIFICANT CHANGE UP
-  AMOXICILLIN/CLAVULANIC ACID: SIGNIFICANT CHANGE UP
-  AMPICILLIN/SULBACTAM: SIGNIFICANT CHANGE UP
-  AMPICILLIN: SIGNIFICANT CHANGE UP
-  AZTREONAM: SIGNIFICANT CHANGE UP
-  CEFAZOLIN: SIGNIFICANT CHANGE UP
-  CEFEPIME: SIGNIFICANT CHANGE UP
-  CEFOXITIN: SIGNIFICANT CHANGE UP
-  CEFTRIAXONE: SIGNIFICANT CHANGE UP
-  CIPROFLOXACIN: SIGNIFICANT CHANGE UP
-  ERTAPENEM: SIGNIFICANT CHANGE UP
-  GENTAMICIN: SIGNIFICANT CHANGE UP
-  IMIPENEM: SIGNIFICANT CHANGE UP
-  LEVOFLOXACIN: SIGNIFICANT CHANGE UP
-  MEROPENEM: SIGNIFICANT CHANGE UP
-  NITROFURANTOIN: SIGNIFICANT CHANGE UP
-  PIPERACILLIN/TAZOBACTAM: SIGNIFICANT CHANGE UP
-  TIGECYCLINE: SIGNIFICANT CHANGE UP
-  TOBRAMYCIN: SIGNIFICANT CHANGE UP
-  TRIMETHOPRIM/SULFAMETHOXAZOLE: SIGNIFICANT CHANGE UP
ANION GAP SERPL CALC-SCNC: 9 MMOL/L — SIGNIFICANT CHANGE UP (ref 5–17)
BUN SERPL-MCNC: 18 MG/DL — SIGNIFICANT CHANGE UP (ref 7–23)
CALCIUM SERPL-MCNC: 8 MG/DL — LOW (ref 8.4–10.5)
CHLORIDE SERPL-SCNC: 107 MMOL/L — SIGNIFICANT CHANGE UP (ref 96–108)
CO2 SERPL-SCNC: 24 MMOL/L — SIGNIFICANT CHANGE UP (ref 22–31)
CREAT SERPL-MCNC: 0.98 MG/DL — SIGNIFICANT CHANGE UP (ref 0.5–1.3)
CULTURE RESULTS: SIGNIFICANT CHANGE UP
GLUCOSE SERPL-MCNC: 107 MG/DL — HIGH (ref 70–99)
HCT VFR BLD CALC: 36.8 % — LOW (ref 39–50)
HGB BLD-MCNC: 12.3 G/DL — LOW (ref 13–17)
LACTATE SERPL-SCNC: 2.4 MMOL/L — HIGH (ref 0.7–2)
LACTATE SERPL-SCNC: 2.6 MMOL/L — HIGH (ref 0.7–2)
LDH SERPL L TO P-CCNC: 216 U/L — SIGNIFICANT CHANGE UP (ref 50–242)
MCHC RBC-ENTMCNC: 29.4 PG — SIGNIFICANT CHANGE UP (ref 27–34)
MCHC RBC-ENTMCNC: 33.4 GM/DL — SIGNIFICANT CHANGE UP (ref 32–36)
MCV RBC AUTO: 88.1 FL — SIGNIFICANT CHANGE UP (ref 80–100)
METHOD TYPE: SIGNIFICANT CHANGE UP
ORGANISM # SPEC MICROSCOPIC CNT: SIGNIFICANT CHANGE UP
ORGANISM # SPEC MICROSCOPIC CNT: SIGNIFICANT CHANGE UP
PLATELET # BLD AUTO: 161 K/UL — SIGNIFICANT CHANGE UP (ref 150–400)
POTASSIUM SERPL-MCNC: 3.8 MMOL/L — SIGNIFICANT CHANGE UP (ref 3.5–5.3)
POTASSIUM SERPL-SCNC: 3.8 MMOL/L — SIGNIFICANT CHANGE UP (ref 3.5–5.3)
RBC # BLD: 4.17 M/UL — LOW (ref 4.2–5.8)
RBC # FLD: 12 % — SIGNIFICANT CHANGE UP (ref 10.3–14.5)
SODIUM SERPL-SCNC: 140 MMOL/L — SIGNIFICANT CHANGE UP (ref 135–145)
SPECIMEN SOURCE: SIGNIFICANT CHANGE UP
WBC # BLD: 6.8 K/UL — SIGNIFICANT CHANGE UP (ref 3.8–10.5)
WBC # FLD AUTO: 6.8 K/UL — SIGNIFICANT CHANGE UP (ref 3.8–10.5)

## 2018-11-18 PROCEDURE — 99232 SBSQ HOSP IP/OBS MODERATE 35: CPT

## 2018-11-18 PROCEDURE — 99233 SBSQ HOSP IP/OBS HIGH 50: CPT

## 2018-11-18 RX ORDER — SODIUM CHLORIDE 9 MG/ML
1000 INJECTION INTRAMUSCULAR; INTRAVENOUS; SUBCUTANEOUS
Qty: 0 | Refills: 0 | Status: DISCONTINUED | OUTPATIENT
Start: 2018-11-18 | End: 2018-11-18

## 2018-11-18 RX ORDER — SODIUM CHLORIDE 9 MG/ML
500 INJECTION INTRAMUSCULAR; INTRAVENOUS; SUBCUTANEOUS ONCE
Qty: 0 | Refills: 0 | Status: DISCONTINUED | OUTPATIENT
Start: 2018-11-18 | End: 2018-11-19

## 2018-11-18 RX ORDER — AMLODIPINE BESYLATE 2.5 MG/1
5 TABLET ORAL DAILY
Qty: 0 | Refills: 0 | Status: DISCONTINUED | OUTPATIENT
Start: 2018-11-18 | End: 2018-11-19

## 2018-11-18 RX ADMIN — SODIUM CHLORIDE 75 MILLILITER(S): 9 INJECTION INTRAMUSCULAR; INTRAVENOUS; SUBCUTANEOUS at 05:14

## 2018-11-18 RX ADMIN — Medication 1 APPLICATION(S): at 21:01

## 2018-11-18 RX ADMIN — Medication 1 APPLICATION(S): at 14:27

## 2018-11-18 RX ADMIN — Medication 1 APPLICATION(S): at 05:13

## 2018-11-18 RX ADMIN — AMLODIPINE BESYLATE 5 MILLIGRAM(S): 2.5 TABLET ORAL at 14:27

## 2018-11-18 RX ADMIN — ENOXAPARIN SODIUM 40 MILLIGRAM(S): 100 INJECTION SUBCUTANEOUS at 20:56

## 2018-11-18 NOTE — CONSULT NOTE ADULT - SUBJECTIVE AND OBJECTIVE BOX
HPI:   Patient is a 68y male with hx HTN, presented to Ed with syncopal episodes, fall x4 as per wife decreased PO intake and not drinking enough fluids. Pt denies any fevers, no ha, no abd pain, no chills, no dysuria, no n/v, no diarrhea, he feels well now, afebrile, communicates clearly and ambulates without any dizziness or lightheadedness.       REVIEW OF SYSTEMS:  All other review of systems negative (Comprehensive ROS)    PAST MEDICAL & SURGICAL HISTORY:  Macular degeneration of both eyes  Retinal detachments and defects: bilateral  Glaucoma  Hypertension  Hip pain, right  H/O total hip arthroplasty, right: Right hip replacement 2010  S/P appendectomy  Recent retinal detachment, total, bilateral: 1989 (left), 2006 (right)  S/P hip replacement, left: 2006      Allergies    Coumadin (Other (Mod to Severe); Flushing)    Intolerances        Antimicrobials Day #      Other Medications:  amLODIPine   Tablet 5 milliGRAM(s) Oral daily  BACItracin   Ointment 1 Application(s) Topical three times a day  enoxaparin Injectable 40 milliGRAM(s) SubCutaneous every 24 hours  influenza   Vaccine 0.5 milliLiter(s) IntraMuscular once  sodium chloride 0.9% Bolus 500 milliLiter(s) IV Bolus once      FAMILY HISTORY:  Family history of breast cancer in mother (Mother)      SOCIAL HISTORY:  Smoking:     ETOH:     Drug Use:     Single     T(F): 99 (11-18-18 @ 16:51), Max: 99 (11-18-18 @ 16:51)  HR: 164 (11-18-18 @ 16:51)  BP: 196/94 (11-18-18 @ 16:54)  RR: 15 (11-18-18 @ 16:51)  SpO2: 96% (11-18-18 @ 16:51)  Wt(kg): --    PHYSICAL EXAM:  General: alert, no acute distress  Eyes:  anicteric, no conjunctival injection, no discharge  Oropharynx: no lesions or injection 	  Neck: supple, without adenopathy  Lungs: clear to auscultation  Heart: regular rate and rhythm; no murmur, rubs or gallops  Abdomen: soft, nondistended, nontender, without mass or organomegaly  Skin: no lesions  Extremities: no clubbing, cyanosis, or edema  Neurologic: alert, oriented, moves all extremities    LAB RESULTS:                        12.3   6.8   )-----------( 161      ( 18 Nov 2018 06:16 )             36.8     11-18    140  |  107  |  18  ----------------------------<  107<H>  3.8   |  24  |  0.98    Ca    8.0<L>      18 Nov 2018 06:16            MICROBIOLOGY REVIEWED:    RADIOLOGY REVIEWED:

## 2018-11-18 NOTE — CONSULT NOTE ADULT - ASSESSMENT
syncopal episodes, falls  dehydration  no fever or leukocytosis  no localizing c/o  etiology of mildly elevated lactic acid likely not infectious in nature    PLAN:  no indications for abx   f/u cultures, WBC and fever trend  neurology and cardiology w/u in progress  d/w Dr Bridges syncopal episodes, falls  dehydration  no fever or leukocytosis  no localizing c/o  etiology of mildly elevated lactic acid likely not infectious in nature    PLAN:  no indications for abx   f/u cultures, WBC and fever trend  check TSH  neurology and cardiology w/u in progress  d/w Dr Bridges

## 2018-11-18 NOTE — PROGRESS NOTE ADULT - SUBJECTIVE AND OBJECTIVE BOX
Follow up for syncope  SUBJ:    feeling and looking better with medical treatment      PMH  Macular degeneration of both eyes  Retinal detachments and defects  Glaucoma  Hypertension  Hip pain, right      MEDICATIONS  (STANDING):  amLODIPine   Tablet 5 milliGRAM(s) Oral daily  BACItracin   Ointment 1 Application(s) Topical three times a day  enoxaparin Injectable 40 milliGRAM(s) SubCutaneous every 24 hours  influenza   Vaccine 0.5 milliLiter(s) IntraMuscular once  sodium chloride 0.9% Bolus 500 milliLiter(s) IV Bolus once    MEDICATIONS  (PRN):        PHYSICAL EXAM:  Vital Signs Last 24 Hrs  T(C): 36.7 (18 Nov 2018 14:31), Max: 37.3 (17 Nov 2018 15:48)  T(F): 98 (18 Nov 2018 14:31), Max: 99.1 (17 Nov 2018 15:48)  HR: 77 (18 Nov 2018 14:31) (65 - 89)  BP: 168/73 (18 Nov 2018 14:31) (131/68 - 171/83)  BP(mean): --  RR: 16 (18 Nov 2018 14:31) (16 - 17)  SpO2: 97% (18 Nov 2018 14:31) (93% - 99%)    GENERAL: NAD, well-groomed, well-developed  HEAD:  Atraumatic, Normocephalic  EYES: EOMI, PERRLA, conjunctiva and sclera clear  ENT: Moist mucous membranes,  NECK: Supple, No JVD, no bruits  CHEST/LUNG: Clear to percussion bilaterally; No rales, rhonchi, wheezing, or rubs  HEART: Regular rate and rhythm; No murmurs, rubs, or gallops PMI non displaced.  ABDOMEN: Soft, Nontender, Nondistended; Bowel sounds present  EXTREMITIES:  2+ Peripheral Pulses, No clubbing, cyanosis, or edema  SKIN: No rashes or lesions  NERVOUS SYSTEM:  Cranial Nerves II-XII intact      TELEMETRY:    rsr    ECG:    < from: 12 Lead ECG (11.17.18 @ 06:55) >  Ventricular Rate 66 BPM    Atrial Rate 66 BPM    P-R Interval 216 ms    QRS Duration 100 ms    Q-T Interval 402 ms    QTC Calculation(Bezet) 421 ms    P Axis 58 degrees    R Axis 96 degrees    T Axis 14 degrees    Diagnosis Line Sinus rhythm with 1st degree AV block  Rightward axis  Nonspecific ST abnormality  Borderline ECG  When compared with ECG of 16-NOV-2018 14:00,  No significant change was found    Confirmed by DENILSON MAXWELL MD ([id number]) on 11/17/2018 8:31:39 AM    < end of copied text >    ECHO:    < from: TTE Echo Complete w/Doppler (11.17.18 @ 09:08) >  Summary:   1. Left ventricular ejection fraction, by visual estimation, is 55 to   60%.   2. Normal global left ventricular systolic function.   3. Spectral Doppler shows pseudonormal pattern of left ventricular   myocardial filling (Grade II diastolic dysfunction).   4. There is mild septal left ventricular hypertrophy.   5. Moderately enlarged right ventricle.   6. There is no evidence of pericardial effusion.   7. Thickening of the anterior and posterior mitral valve leaflets.   8. Mild tricuspid regurgitation.   9. Sclerotic aortic valve with normal opening.  10. Pulmonic valve regurgitation.  11. Structurally normal pulmonic valve, with normal leaflet excursion.  12. Estimated pulmonary artery systolic pressure is 55.5 mmHg assuming a   right atrial pressure of 10 mmHg, which is consistent with moderate   pulmonary hypertension.  13. The aortic valve mean gradient is 5.6 mmHg consistent with normally   openingaortic valve.    [id number] Denilson Maxwell MD, Merged with [facility] , Electronically signed on 11/17/2018 at   11:52:06 AM      *** Final ***      DENILSON MAXWELL   This document has been electronically signed. Nov 17 2018  9:08AM        < end of copied text >      LABS:                        12.3   6.8   )-----------( 161      ( 18 Nov 2018 06:16 )             36.8     11-18    140  |  107  |  18  ----------------------------<  107<H>  3.8   |  24  |  0.98    Ca    8.0<L>      18 Nov 2018 06:16      CARDIAC MARKERS ( 17 Nov 2018 00:10 )  <.017 ng/mL / x     / x     / x     / x      CARDIAC MARKERS ( 16 Nov 2018 20:20 )  <.017 ng/mL / x     / x     / x     / x              I&O's Summary    17 Nov 2018 07:01  -  18 Nov 2018 07:00  --------------------------------------------------------  IN: 1035 mL / OUT: 800 mL / NET: 235 mL    18 Nov 2018 07:01  -  18 Nov 2018 14:35  --------------------------------------------------------  IN: 675 mL / OUT: 0 mL / NET: 675 mL      BNP    RADIOLOGY & ADDITIONAL STUDIES:    < from: Xray Chest 1 View AP/PA (11.16.18 @ 15:08) >  EXAM:  XR CHEST AP OR PA 1V      PROCEDURE DATE:  11/16/2018        INTERPRETATION:  Chest radiograph (one view)     CPT 58199    CLINICAL INFORMATION:       Chest pain of unknown severity or duration.    TECHNIQUE:  Single frontal view of the chest was obtained.    FINDINGS:  Prior study dated 5/25/2008 was available for review.    The lungs are clear.  No pleural abnormality is seen.      The heart and mediastinum appear intact.           IMPRESSION: No evidence of active chest disease.        VI ROSE M.D., ATTENDING RADIOLOGIST  This document has been electronically signed. Nov 16 2018  3:21PM    < end of copied text >      ECHO:

## 2018-11-18 NOTE — PROGRESS NOTE ADULT - SUBJECTIVE AND OBJECTIVE BOX
Patient is a 68y old  Male who presents with a chief complaint of Fever for 2-3 days, bedridden. Syncopal episodes x 3 and 1 near syncope (2018 12:20)      Patient seen and examined at bedside.    ALLERGIES:  Coumadin (Other (Mod to Severe); Flushing)    MEDICATIONS:  BACItracin   Ointment 1 Application(s) Topical three times a day  enoxaparin Injectable 40 milliGRAM(s) SubCutaneous every 24 hours  influenza   Vaccine 0.5 milliLiter(s) IntraMuscular once  sodium chloride 0.9%. 1000 milliLiter(s) IV Continuous <Continuous>    Vital Signs Last 24 Hrs  T(F): 98.1 (2018 08:51), Max: 99.1 (2018 15:48)  HR: 73 (2018 08:51) (65 - 89)  BP: 171/83 (2018 08:51) (131/68 - 171/83)  RR: 16 (2018 08:51) (15 - 17)  SpO2: 98% (2018 08:51) (93% - 99%)  I&O's Summary    2018 07:01  -  2018 07:00  --------------------------------------------------------  IN: 960 mL / OUT: 800 mL / NET: 160 mL        PHYSICAL EXAM:  General: NAD, A/O x 3  ENT: MMM  Neck: Supple, No JVD  Lungs: Clear to auscultation bilaterally  Cardio: RRR, S1/S2, No murmurs  Abdomen: Soft, Nontender, Nondistended; Bowel sounds present  Extremities: No cyanosis, No edema    LABS:                        12.3   6.8   )-----------( 161      ( 2018 06:16 )             36.8     -18    140  |  107  |  18  ----------------------------<  107  3.8   |  24  |  0.98    Ca    8.0      2018 06:16    TPro  7.5  /  Alb  3.5  /  TBili  0.8  /  DBili  x   /  AST  32  /  ALT  67  /  AlkPhos  88  -16    eGFR if Non African American: 78 mL/min/1.73M2 (18 @ 06:16)  eGFR if African American: 90 mL/min/1.73M2 (18 @ 06:16)    PT/INR - ( 2018 14:15 )   PT: 13.2 sec;   INR: 1.17 ratio         PTT - ( 2018 14:15 )  PTT:31.0 sec  Lactate, Blood: 2.6 mmol/L ( @ 08:36)  Lactate, Blood: 2.3 mmol/L ( @ 09:45)  Lactate, Blood: 2.0 mmol/L ( @ 14:15)    CARDIAC MARKERS ( 2018 00:10 )  <.017 ng/mL / x     / x     / x     / x      CARDIAC MARKERS ( 2018 20:20 )  <.017 ng/mL / x     / x     / x     / x      CARDIAC MARKERS ( 2018 14:15 )  <.017 ng/mL / x     / 255 U/L / x     / x                  CAPILLARY BLOOD GLUCOSE          Urinalysis Basic - ( 2018 16:15 )    Color: Yellow / Appearance: Clear / S.025 / pH: x  Gluc: x / Ketone: Small  / Bili: Small / Urobili: 1   Blood: x / Protein: 100 / Nitrite: Negative   Leuk Esterase: Trace / RBC: 5-10 /HPF / WBC 0-2 /HPF   Sq Epi: x / Non Sq Epi: Neg.-Few / Bacteria: Negative /HPF        Culture - Urine (collected 2018 16:15)  Source: .Urine Clean Catch (Midstream)  Preliminary Report (2018 19:10):    10,000 - 49,000 CFU/mL Escherichia coli    Culture - Blood (collected 2018 14:15)  Source: .Blood Blood-Peripheral  Preliminary Report (2018 21:01):    No growth to date.    Culture - Blood (collected 2018 14:15)  Source: .Blood Blood-Peripheral  Preliminary Report (2018 21:01):    No growth to date.        RADIOLOGY & ADDITIONAL TESTS:  < from: US Duplex Carotid Arteries Complete, Bilateral (18 @ 12:10) >    IMPRESSION:    No evidence of hemodynamically significant stenosis by velocity   measurements.    Measurement of carotid stenosis is based on velocity parameters that   correlate the residual internal carotid diameter with that of the more   distal vessel in accordance with a method such as the [place] American   Symptomatic Carotid Endarterectomy Trial (NASCET).             < end of copied text >  < from: US Renal (18 @ 11:54) >  IMPRESSION:     Right renal cyst  Incidental note is made of fatty infiltration of the liver                      EPIFANIO TORRES M.D.,ATTENDING RADIOLOGIST  This document has been electronically signed. 2018 12:23PM        < end of copied text >  < from: CT Head No Cont (18 @ 15:25) >    Impression:  1. Unremarkable noncontrast CT scan of the brain.          < end of copied text >    < from: TTE Echo Complete w/Doppler (18 @ 09:08) >    Summary:   1. Left ventricular ejection fraction, by visual estimation, is 55 to   60%.   2. Normal global left ventricular systolic function.   3. Spectral Doppler shows pseudonormal pattern of left ventricular   myocardial filling (Grade II diastolic dysfunction).   4. There is mild septal left ventricular hypertrophy.   5. Moderately enlarged right ventricle.   6. There is no evidence of pericardial effusion.   7. Thickening of the anterior and posterior mitral valve leaflets.   8. Mild tricuspid regurgitation.   9. Sclerotic aortic valve with normal opening.  10. Pulmonic valve regurgitation.  11. Structurally normal pulmonic valve, with normal leaflet excursion.  12. Estimated pulmonary artery systolic pressure is 55.5 mmHg assuming a   right atrial pressure of 10 mmHg, which is consistent with moderate   pulmonary hypertension.  13. The aortic valve mean gradient is 5.6 mmHg consistent with normally   openingaortic valve.    [id number] Lai Maxwell MD, FACC , Electronically signed on 2018 at   11:52:06 AM              *** Final ***                    LAI MAXWELL   This document has been electronically signed. 2018  9:08AM        < end of copied text >    Care Discussed with Consultants/Other Providers:

## 2018-11-18 NOTE — PROVIDER CONTACT NOTE (CRITICAL VALUE NOTIFICATION) - ACTION/TREATMENT ORDERED:
repeat in four hours at 1400 increase fluid to 100
Dr Hernandez will review, and continue to monitor at this time
Increase fluids, NS,  to 100 cc/hr

## 2018-11-18 NOTE — CONSULT NOTE ADULT - REASON FOR ADMISSION
Fever for 2-3 days, bedridden. Syncopal episodes x 3 and 1 near syncope

## 2018-11-18 NOTE — CONSULT NOTE ADULT - SUBJECTIVE AND OBJECTIVE BOX
Neurology consult    ANYA LEONG68yMale     Patient is a 68y old  Male who presents with a chief complaint of Fever for 2-3 days, bedridden. Syncopal episodes x 3 and 1 near syncope (2018 09:36)      HPI:  68 year-old male with PMH: HTN. Presents to the ED with multiple syncopal episodes. As per significant other-Cinthya, she witnessed pt hit his head in at least one episode and an other where she found him flat on the ground. Pt with recent fever and fatigue over last 2-3 days. Pt with no chest pain, palpitations dizziness, headaches prior to these episodes. (2018 18:17)      MEDICATIONS    amLODIPine   Tablet 5 milliGRAM(s) Oral daily  BACItracin   Ointment 1 Application(s) Topical three times a day  enoxaparin Injectable 40 milliGRAM(s) SubCutaneous every 24 hours  influenza   Vaccine 0.5 milliLiter(s) IntraMuscular once  sodium chloride 0.9% Bolus 500 milliLiter(s) IV Bolus once      PMH: Macular degeneration of both eyes  Retinal detachments and defects  Glaucoma  Hypertension  Hip pain, right       PSH: H/O total hip arthroplasty, right  S/P appendectomy  Recent retinal detachment, total, bilateral  S/P hip replacement, left      Family history:   FAMILY HISTORY:  Family history of breast cancer in mother (Mother)      SOCIAL HISTORY:  No history of tobacco or alcohol use     Allergies    Coumadin (Other (Mod to Severe); Flushing)    Intolerances            Vital Signs Last 24 Hrs  T(C): 36.7 (2018 08:51), Max: 37.3 (2018 15:48)  T(F): 98.1 (2018 08:51), Max: 99.1 (2018 15:48)  HR: 73 (2018 08:51) (65 - 89)  BP: 171/83 (2018 08:51) (131/68 - 171/83)  BP(mean): --  RR: 16 (2018 08:51) (16 - 17)  SpO2: 98% (2018 08:51) (93% - 99%)      On Neurological Examination:    Head: normocephalic Neck: supple no carotid bruits    Mental Status - Patient is alert, awake, oriented X3 no dysarthria no aphasia  Cranial Nerves - PERRL, EOMI, VFF, normal V through XII no nystagmus    Motor Exam :  No drift normal strength tone and coordination no abnormal movements no focality    Sensory    Intact to light touch and pinprick bilaterally normal DSS to touch    Reflexes:  symmetric @ 2+ plantars downgoing    Gait -  not tested                                                         LABS:  CBC Full  -  ( 2018 06:16 )  WBC Count : 6.8 K/uL  Hemoglobin : 12.3 g/dL  Hematocrit : 36.8 %  Platelet Count - Automated : 161 K/uL  Mean Cell Volume : 88.1 fl  Mean Cell Hemoglobin : 29.4 pg  Mean Cell Hemoglobin Concentration : 33.4 gm/dL  Auto Neutrophil # : x  Auto Lymphocyte # : x  Auto Monocyte # : x  Auto Eosinophil # : x  Auto Basophil # : x  Auto Neutrophil % : x  Auto Lymphocyte % : x  Auto Monocyte % : x  Auto Eosinophil % : x  Auto Basophil % : x    Urinalysis Basic - ( 2018 16:15 )    Color: Yellow / Appearance: Clear / S.025 / pH: x  Gluc: x / Ketone: Small  / Bili: Small / Urobili: 1   Blood: x / Protein: 100 / Nitrite: Negative   Leuk Esterase: Trace / RBC: 5-10 /HPF / WBC 0-2 /HPF   Sq Epi: x / Non Sq Epi: Neg.-Few / Bacteria: Negative /HPF      -18    140  |  107  |  18  ----------------------------<  107<H>  3.8   |  24  |  0.98    Ca    8.0<L>      2018 06:16    TPro  7.5  /  Alb  3.5  /  TBili  0.8  /  DBili  x   /  AST  32  /  ALT  67<H>  /  AlkPhos  88  -16    LIVER FUNCTIONS - ( 2018 14:15 )  Alb: 3.5 g/dL / Pro: 7.5 g/dL / ALK PHOS: 88 U/L / ALT: 67 U/L DA / AST: 32 U/L / GGT: x           Hemoglobin A1C:       PT/INR - ( 2018 14:15 )   PT: 13.2 sec;   INR: 1.17 ratio         PTT - ( 2018 14:15 )  PTT:31.0 sec          RADIOLOGY  CT:   < from: CT Head No Cont (18 @ 15:25) >  EXAM:  CT BRAIN      PROCEDURE DATE:  2018        INTERPRETATION:  HISTORY: Syncope    Evaluation demonstrates no evidence of mass-effect or midline shift. No   intraparenchymal mass lesions or hemorrhage is identified.     There is   no evidence of hydrocephalus. No extra-axial collections are noted.    The bone windows demonstrate no gross osseous abnormalities.        Impression:  1. Unremarkable noncontrast CT scan of the brain.              < end of copied text >      < from: US Duplex Carotid Arteries Complete, Bilateral (11.17.18 @ 12:10) >    EXAM:  US DPLX CAROTIDS COMPL BI      PROCEDURE DATE:  2018        INTERPRETATION:  CLINICAL STATEMENT: Evaluate for carotid artery   stenosis.  Syncope TECHNIQUE: Carotid artery ultrasound was performed.    COMPARISON: None.    FINDINGS:    There is no significant narrowing in the visualized common carotid and   internal carotid arteries. There is a small amount of plaque in the right   carotid bulb and a larger amount of plaque in the left carotid bulb.    Peak systolic velocity measurements in centimeters per second as   described below.    RIGHT:    CCA: 150  ICA: 78  ICA/CCA ratio: 0.7  There is antegrade flow in the right vertebral artery.    LEFT:    CCA: 117  ICA: 100  ICA/CCA ratio: 1.1  There is antegrade flow in the left vertebral artery. There is elevated   velocity in the left vertebral artery.    IMPRESSION:    No evidence of hemodynamically significant stenosis by velocity   measurements.    < end of copied text >

## 2018-11-18 NOTE — PROGRESS NOTE ADULT - ATTENDING COMMENTS
syncope  currently compensated. would continue to treat medical illness. fluids etc as you are doing. note a normal EF  no new cardiac recommendations.

## 2018-11-18 NOTE — CONSULT NOTE ADULT - ASSESSMENT
67 yo male with several syncopal episodes in setting of febrile illness with evidence of dehydration.  Neurologic exam, CT head, carotid sonogram all unremarkable.  Doubt TIA/CVA, seizure disorder.  Suspect vasovagal syncope in patient with febrile illness.    REC:  defer further neurological w/u.

## 2018-11-18 NOTE — PROGRESS NOTE ADULT - ASSESSMENT
67 yo m admitted 11/16/18 presents with multiple syncopal episodes    1. syncope: 3 neg tni, echo showed grade 2 diastolic dysfunction ef 55-60%  2. leukocytosis resolved rvp r/o d/c isolation. elevated lactate secondary to dehydration increase ns to 100ml/hr repeat lactate at 2pm if neg d/c home today. Ua neg patient is not complaining of any dysuria urine culture was done should 10-49,000 ecoli will not treat in setting of no symptoms.   3. reji resolved  4. essential htn: elevated started norvasc 5mg q24   5. dvt ppx: lovenox  6. dispo: d/c home when stable

## 2018-11-19 ENCOUNTER — TRANSCRIPTION ENCOUNTER (OUTPATIENT)
Age: 68
End: 2018-11-19

## 2018-11-19 VITALS
OXYGEN SATURATION: 95 % | DIASTOLIC BLOOD PRESSURE: 78 MMHG | TEMPERATURE: 98 F | HEART RATE: 59 BPM | RESPIRATION RATE: 16 BRPM | SYSTOLIC BLOOD PRESSURE: 139 MMHG

## 2018-11-19 DIAGNOSIS — N17.9 ACUTE KIDNEY FAILURE, UNSPECIFIED: ICD-10-CM

## 2018-11-19 DIAGNOSIS — D72.829 ELEVATED WHITE BLOOD CELL COUNT, UNSPECIFIED: ICD-10-CM

## 2018-11-19 LAB
ANION GAP SERPL CALC-SCNC: 8 MMOL/L — SIGNIFICANT CHANGE UP (ref 5–17)
BUN SERPL-MCNC: 14 MG/DL — SIGNIFICANT CHANGE UP (ref 7–23)
CALCIUM SERPL-MCNC: 8.8 MG/DL — SIGNIFICANT CHANGE UP (ref 8.4–10.5)
CHLORIDE SERPL-SCNC: 104 MMOL/L — SIGNIFICANT CHANGE UP (ref 96–108)
CO2 SERPL-SCNC: 25 MMOL/L — SIGNIFICANT CHANGE UP (ref 22–31)
CREAT SERPL-MCNC: 0.97 MG/DL — SIGNIFICANT CHANGE UP (ref 0.5–1.3)
GLUCOSE SERPL-MCNC: 112 MG/DL — HIGH (ref 70–99)
HCT VFR BLD CALC: 39.2 % — SIGNIFICANT CHANGE UP (ref 39–50)
HGB BLD-MCNC: 13.2 G/DL — SIGNIFICANT CHANGE UP (ref 13–17)
LACTATE SERPL-SCNC: 0.9 MMOL/L — SIGNIFICANT CHANGE UP (ref 0.7–2)
MCHC RBC-ENTMCNC: 29.6 PG — SIGNIFICANT CHANGE UP (ref 27–34)
MCHC RBC-ENTMCNC: 33.7 GM/DL — SIGNIFICANT CHANGE UP (ref 32–36)
MCV RBC AUTO: 87.8 FL — SIGNIFICANT CHANGE UP (ref 80–100)
PLATELET # BLD AUTO: 187 K/UL — SIGNIFICANT CHANGE UP (ref 150–400)
POTASSIUM SERPL-MCNC: 3.7 MMOL/L — SIGNIFICANT CHANGE UP (ref 3.5–5.3)
POTASSIUM SERPL-SCNC: 3.7 MMOL/L — SIGNIFICANT CHANGE UP (ref 3.5–5.3)
RBC # BLD: 4.47 M/UL — SIGNIFICANT CHANGE UP (ref 4.2–5.8)
RBC # FLD: 11.9 % — SIGNIFICANT CHANGE UP (ref 10.3–14.5)
SODIUM SERPL-SCNC: 137 MMOL/L — SIGNIFICANT CHANGE UP (ref 135–145)
T3 SERPL-MCNC: 101 NG/DL — SIGNIFICANT CHANGE UP (ref 80–200)
T4 AB SER-ACNC: 7.2 UG/DL — SIGNIFICANT CHANGE UP (ref 4.6–12)
TSH SERPL-MCNC: 3.89 UIU/ML — SIGNIFICANT CHANGE UP (ref 0.27–4.2)
WBC # BLD: 6.2 K/UL — SIGNIFICANT CHANGE UP (ref 3.8–10.5)
WBC # FLD AUTO: 6.2 K/UL — SIGNIFICANT CHANGE UP (ref 3.8–10.5)

## 2018-11-19 PROCEDURE — 87486 CHLMYD PNEUM DNA AMP PROBE: CPT

## 2018-11-19 PROCEDURE — 93306 TTE W/DOPPLER COMPLETE: CPT

## 2018-11-19 PROCEDURE — 81001 URINALYSIS AUTO W/SCOPE: CPT

## 2018-11-19 PROCEDURE — 87186 SC STD MICRODIL/AGAR DIL: CPT

## 2018-11-19 PROCEDURE — 99285 EMERGENCY DEPT VISIT HI MDM: CPT | Mod: 25

## 2018-11-19 PROCEDURE — 83605 ASSAY OF LACTIC ACID: CPT

## 2018-11-19 PROCEDURE — 82550 ASSAY OF CK (CPK): CPT

## 2018-11-19 PROCEDURE — 85730 THROMBOPLASTIN TIME PARTIAL: CPT

## 2018-11-19 PROCEDURE — 87400 INFLUENZA A/B EACH AG IA: CPT

## 2018-11-19 PROCEDURE — 80048 BASIC METABOLIC PNL TOTAL CA: CPT

## 2018-11-19 PROCEDURE — 71045 X-RAY EXAM CHEST 1 VIEW: CPT

## 2018-11-19 PROCEDURE — 87581 M.PNEUMON DNA AMP PROBE: CPT

## 2018-11-19 PROCEDURE — 90686 IIV4 VACC NO PRSV 0.5 ML IM: CPT

## 2018-11-19 PROCEDURE — 85610 PROTHROMBIN TIME: CPT

## 2018-11-19 PROCEDURE — 87086 URINE CULTURE/COLONY COUNT: CPT

## 2018-11-19 PROCEDURE — 93005 ELECTROCARDIOGRAM TRACING: CPT

## 2018-11-19 PROCEDURE — 87040 BLOOD CULTURE FOR BACTERIA: CPT

## 2018-11-19 PROCEDURE — 93880 EXTRACRANIAL BILAT STUDY: CPT

## 2018-11-19 PROCEDURE — 87633 RESP VIRUS 12-25 TARGETS: CPT

## 2018-11-19 PROCEDURE — 83615 LACTATE (LD) (LDH) ENZYME: CPT

## 2018-11-19 PROCEDURE — 80053 COMPREHEN METABOLIC PANEL: CPT

## 2018-11-19 PROCEDURE — 84443 ASSAY THYROID STIM HORMONE: CPT

## 2018-11-19 PROCEDURE — 70450 CT HEAD/BRAIN W/O DYE: CPT

## 2018-11-19 PROCEDURE — 97161 PT EVAL LOW COMPLEX 20 MIN: CPT

## 2018-11-19 PROCEDURE — 84484 ASSAY OF TROPONIN QUANT: CPT

## 2018-11-19 PROCEDURE — 76770 US EXAM ABDO BACK WALL COMP: CPT

## 2018-11-19 PROCEDURE — 84436 ASSAY OF TOTAL THYROXINE: CPT

## 2018-11-19 PROCEDURE — 96374 THER/PROPH/DIAG INJ IV PUSH: CPT

## 2018-11-19 PROCEDURE — 83880 ASSAY OF NATRIURETIC PEPTIDE: CPT

## 2018-11-19 PROCEDURE — 76775 US EXAM ABDO BACK WALL LIM: CPT

## 2018-11-19 PROCEDURE — 85027 COMPLETE CBC AUTOMATED: CPT

## 2018-11-19 PROCEDURE — 99239 HOSP IP/OBS DSCHRG MGMT >30: CPT

## 2018-11-19 PROCEDURE — 84480 ASSAY TRIIODOTHYRONINE (T3): CPT

## 2018-11-19 RX ORDER — SODIUM CHLORIDE 9 MG/ML
1000 INJECTION INTRAMUSCULAR; INTRAVENOUS; SUBCUTANEOUS
Qty: 0 | Refills: 0 | Status: DISCONTINUED | OUTPATIENT
Start: 2018-11-19 | End: 2018-11-19

## 2018-11-19 RX ORDER — AMLODIPINE BESYLATE 2.5 MG/1
1 TABLET ORAL
Qty: 0 | Refills: 0 | COMMUNITY
Start: 2018-11-19

## 2018-11-19 RX ADMIN — INFLUENZA VIRUS VACCINE 0.5 MILLILITER(S): 15; 15; 15; 15 SUSPENSION INTRAMUSCULAR at 09:50

## 2018-11-19 RX ADMIN — Medication 1 APPLICATION(S): at 05:55

## 2018-11-19 RX ADMIN — AMLODIPINE BESYLATE 5 MILLIGRAM(S): 2.5 TABLET ORAL at 05:55

## 2018-11-19 NOTE — DISCHARGE NOTE ADULT - MEDICATION SUMMARY - MEDICATIONS TO CHANGE
I will SWITCH the dose or number of times a day I take the medications listed below when I get home from the hospital:    ramipril  -- 7.5 milligram(s) by mouth once a day

## 2018-11-19 NOTE — PROGRESS NOTE ADULT - ATTENDING COMMENTS
I have personally seen and examined patient on the above date.  I discussed the case with PA and I agree with findings and plan as detailed per note above, which I have amended where appropriate.      69 yo m admitted 11/16/18 presents with multiple syncopal episodes    1. syncope: 3 neg tni, echo showed grade 2 diastolic dysfunction ef 55-60% syncope secondary to dehydration   2. leukocytosis resolved rvp r/o d/c isolation. elevated lactate resolved. Ua neg patient is not complaining of any dysuria urine culture was done should 10-49,000 ecoli will not treat in setting of no symptoms.   3. reji resolved  4. essential htn: elevated started norvasc 5mg q24   5. dvt ppx: lovenox  6. dispo: d/c home today if patient develops fever consider repeating UA I have personally seen and examined patient on the above date.  I discussed the case with PA and I agree with findings and plan as detailed per note above, which I have amended where appropriate.      67 yo m admitted 11/16/18 presents with multiple syncopal episodes    1. syncope: 3 neg tni, echo showed grade 2 diastolic dysfunction ef 55-60% syncope secondary to dehydration   2. leukocytosis resolved rvp r/o d/c isolation. elevated lactate resolved. Ua neg patient is not complaining of any dysuria urine culture was done should 10-49,000 ecoli will not treat in setting of no symptoms.   3. reji resolved  4. essential htn: dc norvasc restart acei home med  5. dvt ppx: lovenox  6. dispo: d/c home today if patient develops fever consider repeating UA

## 2018-11-19 NOTE — DISCHARGE NOTE ADULT - HOSPITAL COURSE
67 yo m admitted 11/16/18 presents with multiple syncopal episodes.  Pt. found to have dehydration and fevers, syncope most probably Vasovagal.  Pt. also with YINKA, which resolved.    < from: CT Head No Cont (11.16.18 @ 15:25) >    Impression:  1. Unremarkable noncontrast CT scan of the brain.            < end of copied text >  < from: US Renal (11.17.18 @ 11:54) >    IMPRESSION:     Right renal cyst  Incidental note is made of fatty infiltration of the liver      < end of copied text >      1. syncope: 3 neg tni, echo showed grade 2 diastolic dysfunction ef 55-60%  2. leukocytosis resolved rvp r/o d/c isolation. elevated lactate secondary to dehydration increase ns to 100ml/hr repeat lactate at 2pm if neg d/c home today. Ua neg patient is not complaining of any dysuria urine culture was done should 10-49,000 ecoli will not treat in setting of no symptoms.   3. yinka resolved  4. essential htn: elevated started norvasc 5mg q24 67 yo m admitted 11/16/18 presents with multiple syncopal episodes.  Pt. found to have dehydration and fevers, syncope most probably Vasovagal.  Pt. also with YINKA, which resolved.    < from: CT Head No Cont (11.16.18 @ 15:25) >    Impression:  1. Unremarkable noncontrast CT scan of the brain.            < end of copied text >  < from: US Renal (11.17.18 @ 11:54) >    IMPRESSION:     Right renal cyst  Incidental note is made of fatty infiltration of the liver      < end of copied text >      1. syncope: 3 neg tni, echo showed grade 2 diastolic dysfunction ef 55-60%  2. leukocytosis resolved rvp r/o d/c isolation. elevated lactate secondary to dehydration increase ns to 100ml/hr repeat lactate at 2pm if neg d/c home today. Ua neg patient is not complaining of any dysuria urine culture was done should 10-49,000 ecoli will not treat in setting of no symptoms. If patient c/o fever and chills as outpatient consider repeating UA   3. yinka resolved  4. essential htn: resumed home meds

## 2018-11-19 NOTE — DISCHARGE NOTE ADULT - CARE PLAN
Principal Discharge DX:	Syncope and collapse  Secondary Diagnosis:	Essential hypertension  Secondary Diagnosis:	Macular degeneration of both eyes, unspecified type Principal Discharge DX:	Syncope and collapse  Goal:	to maintain adequate hydration to avoid further episodes  Assessment and plan of treatment:	continue current home medications  Secondary Diagnosis:	Essential hypertension  Secondary Diagnosis:	Macular degeneration of both eyes, unspecified type

## 2018-11-19 NOTE — DISCHARGE NOTE ADULT - MEDICATION SUMMARY - MEDICATIONS TO TAKE
I will START or STAY ON the medications listed below when I get home from the hospital:    ramipril 5 mg oral tablet  -- 1 cap(s) by mouth once a day  -- Indication: For Essential hypertension

## 2018-11-19 NOTE — DISCHARGE NOTE ADULT - PATIENT PORTAL LINK FT
You can access the [facility] Patient Portal, offered by [facility], by registering with the following website: [email or web address]

## 2018-11-19 NOTE — PROGRESS NOTE ADULT - SUBJECTIVE AND OBJECTIVE BOX
CC: f/u for fever    Patient reports: no specific complaints, he is eating well, no GI or  symptoms.No cough.    REVIEW OF SYSTEMS:  All other review of systems negative (Comprehensive ROS)    Antimicrobials Day #  :s/p CTX and doxy on 11/16    Other Medications Reviewed    T(F): 97.8 (11-19-18 @ 12:01), Max: 99.1 (11-18-18 @ 21:58)  HR: 59 (11-19-18 @ 12:01)  BP: 139/78 (11-19-18 @ 12:01)  RR: 16 (11-19-18 @ 12:01)  SpO2: 95% (11-19-18 @ 12:01)  Wt(kg): --    PHYSICAL EXAM:  General: alert, no acute distress  Eyes:  anicteric, no conjunctival injection, no discharge  Oropharynx: no lesions or injection 	  Neck: supple, without adenopathy  Lungs: clear to auscultation  Heart: regular rate and rhythm; no murmur, rubs or gallops  Abdomen: soft, nondistended, nontender, without mass or organomegaly  Skin: no lesions  Extremities: no clubbing, cyanosis, or edema  Neurologic: alert, oriented, moves all extremities    LAB RESULTS:                        13.2   6.2   )-----------( 187      ( 19 Nov 2018 05:40 )             39.2     11-19    137  |  104  |  14  ----------------------------<  112<H>  3.7   |  25  |  0.97    Ca    8.8      19 Nov 2018 05:40          MICROBIOLOGY:  RECENT CULTURES:  11-16 @ 16:15 .Urine Clean Catch (Midstream) Escherichia coli    10,000 - 49,000 CFU/mL Escherichia coli      11-16 @ 14:15 .Blood Blood-Peripheral     No growth to date.          RADIOLOGY REVIEWED:    < from: US Renal (11.17.18 @ 11:54) >  INTERPRETATION:  CLINICAL INFORMATION: Acute kidney injury    COMPARISON: None available.    TECHNIQUE: Sonography of the kidneys and bladder.     FINDINGS:    Right kidney:  12.5 cm. No renal mass, hydronephrosis or calculi. Lower   pole cyst measures 3.6 x 2.8 x 2.8 cm.    Left kidney:  12.2 cm. No renal mass, hydronephrosis or calculi.    Urinary bladder: Within normal limits. Lateral volume is 330 cc.    The visualized aorta is nonaneurysmal.    Incidental note is made of echogenic liver suggestive of fatty   infiltration.    IMPRESSION:     Right renal cyst  Incidental note is made of fatty infiltration of the liver    < end of copied text >

## 2018-11-19 NOTE — PROGRESS NOTE ADULT - REASON FOR ADMISSION
Fever for 2-3 days, bedridden. Syncopal episodes x 3 and 1 near syncope

## 2018-11-19 NOTE — PROGRESS NOTE ADULT - ASSESSMENT
67 yo m admitted 11/16/18 presents with multiple syncopal episodes    1. syncope: 3 neg tni, echo showed grade 2 diastolic dysfunction ef 55-60%  2. leukocytosis resolved rvp r/o d/c isolation. elevated lactate secondary to dehydration increase ns to 100ml/hr repeat lactate at 2pm if neg d/c home today. Ua neg patient is not complaining of any dysuria urine culture was done should 10-49,000 ecoli will not treat in setting of no symptoms.   3. reji resolved  4. essential htn: elevated started norvasc 5mg q24   5. dvt ppx: lovenox  6. dispo: d/c home when stable 69 yo m admitted 11/16/18 presents with multiple syncopal episodes.  Cardiology and Neurology workup negative.  Pt. with vasovagal syncope and ready for D/C.

## 2018-11-19 NOTE — PROGRESS NOTE ADULT - ASSESSMENT
syncopal episodes, falls  dehydration  no fever or leukocytosis  no localizing c/o  etiology of mildly elevated lactic acid likely not infectious in nature  his fevers have resolved.  E coli in urine may be a colonizer  He appears well, no signs of active infection.  PLAN:  no ID objection to discharge  I am reluctant to treat e coli in urine with him better after 1 does of CTX in ER on 1/16  He can be followed conservatively  If he develops any fever or chills as outpatient I would check his urine

## 2018-11-19 NOTE — PROGRESS NOTE ADULT - SUBJECTIVE AND OBJECTIVE BOX
Patient is a 68y old  Male who presents with a chief complaint of Fever for 2-3 days, bedridden. Syncopal episodes x 3 and 1 near syncope (2018 09:12)    Patient seen and examined at bedside.  Pt. feeling better today.    ALLERGIES:  Coumadin (Other (Mod to Severe); Flushing)    MEDICATIONS  (STANDING):  amLODIPine   Tablet 5 milliGRAM(s) Oral daily  BACItracin   Ointment 1 Application(s) Topical three times a day  enoxaparin Injectable 40 milliGRAM(s) SubCutaneous every 24 hours  influenza   Vaccine 0.5 milliLiter(s) IntraMuscular once  sodium chloride 0.9% Bolus 500 milliLiter(s) IV Bolus once  sodium chloride 0.9%. 1000 milliLiter(s) (100 mL/Hr) IV Continuous <Continuous>    MEDICATIONS  (PRN):    Vital Signs Last 24 Hrs  T(F): 97.7 (2018 09:02), Max: 99.1 (2018 21:58)  HR: 58 (2018 09:02) (58 - 164)  BP: 170/74 (2018 09:02) (158/74 - 196/94)  RR: 16 (2018 09:02) (15 - 16)  SpO2: 97% (2018 09:02) (96% - 97%)  I&O's Summary    2018 07:01  -  2018 07:00  --------------------------------------------------------  IN: 1775 mL / OUT: 1000 mL / NET: 775 mL      PHYSICAL EXAM:  General: NAD, A/O x 3  ENT: MMM  Neck: Supple, No JVD  Lungs: Clear to auscultation bilaterally  Cardio: RRR, S1/S2, No murmurs  Abdomen: Soft, Nontender, Nondistended; Bowel sounds present  Extremities: No calf tenderness, No pitting edema  Neuro;  nonfocal exam, no gross deficits    LABS:                        13.2   6.2   )-----------( 187      ( 2018 05:40 )             39.2     11-    137  |  104  |  14  ----------------------------<  112  3.7   |  25  |  0.97    Ca    8.8      2018 05:40    TPro  7.5  /  Alb  3.5  /  TBili  0.8  /  DBili  x   /  AST  32  /  ALT  67  /  AlkPhos  88      eGFR if Non African American: 79 mL/min/1.73M2 (18 @ 05:40)  eGFR if : 91 mL/min/1.73M2 (18 @ 05:40)    PT/INR - ( 2018 14:15 )   PT: 13.2 sec;   INR: 1.17 ratio         PTT - ( 2018 14:15 )  PTT:31.0 sec  Lactate, Blood: 0.9 mmol/L ( @ 05:40)  Lactate, Blood: 2.4 mmol/L ( @ 14:15)  Lactate, Blood: 2.6 mmol/L ( @ 08:36)  Lactate, Blood: 2.3 mmol/L ( @ 09:45)  Lactate, Blood: 2.0 mmol/L ( @ 14:15)    CARDIAC MARKERS ( 2018 00:10 )  <.017 ng/mL / x     / x     / x     / x      CARDIAC MARKERS ( 2018 20:20 )  <.017 ng/mL / x     / x     / x     / x      CARDIAC MARKERS ( 2018 14:15 )  <.017 ng/mL / x     / 255 U/L / x     / x                  CAPILLARY BLOOD GLUCOSE          Urinalysis Basic - ( 2018 16:15 )    Color: Yellow / Appearance: Clear / S.025 / pH: x  Gluc: x / Ketone: Small  / Bili: Small / Urobili: 1   Blood: x / Protein: 100 / Nitrite: Negative   Leuk Esterase: Trace / RBC: 5-10 /HPF / WBC 0-2 /HPF   Sq Epi: x / Non Sq Epi: Neg.-Few / Bacteria: Negative /HPF        Culture - Urine (collected 2018 16:15)  Source: .Urine Clean Catch (Midstream)  Final Report (2018 16:34):    10,000 - 49,000 CFU/mL Escherichia coli  Organism: Escherichia coli (2018 16:34)  Organism: Escherichia coli (2018 16:34)      -  Amikacin: S <=8      -  Amoxicillin/Clavulanic Acid: S <=8/4      -  Ampicillin: S 4 These ampicillin results predict results for amoxicillin      -  Ampicillin/Sulbactam: S <=4/2      -  Aztreonam: S <=4      -  Cefazolin: S <=2 For uncomplicated UTI with K. pneumoniae, E. coli, or P. mirablis: MANI <=16 is sensitive and MANI >=32 is resistant. This also predicts results for oral agents cefaclor, cefdinir, cefpodoxime, cefprozil, cefuroxime axetil, cephalexin and locarbef for uncomplicated UTI. Note that some isolates may be susceptible to these agents while testing resistant to cefazolin.      -  Cefepime: S <=2      -  Cefoxitin: S <=4      -  Ceftriaxone: S <=1 Enterobacter, Citrobacter, and Serratia may develop resistance during prolonged therapy      -  Ciprofloxacin: S <=0.5      -  Ertapenem: S <=0.5      -  Gentamicin: S 2      -  Imipenem: S <=1      -  Levofloxacin: S <=1      -  Meropenem: S <=1      -  Nitrofurantoin: S <=32 Should not be used to treat pyelonephritis      -  Piperacillin/Tazobactam: S <=8      -  Tigecycline: S <=1      -  Tobramycin: S <=2      -  Trimethoprim/Sulfamethoxazole: S <=0.5/9.5      Method Type: MANI    Culture - Blood (collected 2018 14:15)  Source: .Blood Blood-Peripheral  Preliminary Report (2018 21:01):    No growth to date.    Culture - Blood (collected 2018 14:15)  Source: .Blood Blood-Peripheral  Preliminary Report (2018 21:01):    No growth to date.        RADIOLOGY & ADDITIONAL TESTS:    Care Discussed with Consultants/Other Providers:

## 2018-11-19 NOTE — DISCHARGE NOTE ADULT - CARE PROVIDER_API CALL
Reyes, John A (MD), Internal Medicine  [address]  [address]  [place] [address]  Phone: [phone]  Fax: [phone]

## 2018-11-21 LAB
CULTURE RESULTS: SIGNIFICANT CHANGE UP
CULTURE RESULTS: SIGNIFICANT CHANGE UP
SPECIMEN SOURCE: SIGNIFICANT CHANGE UP
SPECIMEN SOURCE: SIGNIFICANT CHANGE UP

## 2018-11-29 ENCOUNTER — APPOINTMENT (OUTPATIENT)
Dept: INTERNAL MEDICINE | Facility: CLINIC | Age: 68
End: 2018-11-29
Payer: MEDICARE

## 2018-11-29 VITALS
SYSTOLIC BLOOD PRESSURE: 158 MMHG | TEMPERATURE: 98 F | OXYGEN SATURATION: 95 % | DIASTOLIC BLOOD PRESSURE: 77 MMHG | RESPIRATION RATE: 16 BRPM | HEIGHT: 69 IN | WEIGHT: 207 LBS | BODY MASS INDEX: 30.66 KG/M2 | HEART RATE: 94 BPM

## 2018-11-29 DIAGNOSIS — R50.9 FEVER, UNSPECIFIED: ICD-10-CM

## 2018-11-29 DIAGNOSIS — R55 SYNCOPE AND COLLAPSE: ICD-10-CM

## 2018-11-29 DIAGNOSIS — N17.9 ACUTE KIDNEY FAILURE, UNSPECIFIED: ICD-10-CM

## 2018-11-29 PROCEDURE — 99495 TRANSJ CARE MGMT MOD F2F 14D: CPT

## 2018-11-29 NOTE — HISTORY OF PRESENT ILLNESS
[Post-hospitalization from ___ Hospital] : Post-hospitalization from [unfilled] Hospital [Admitted on: ___] : The patient was admitted on [unfilled] [Discharged on ___] : discharged on [unfilled] [Discharge Summary] : discharge summary [Pertinent Labs] : pertinent labs [Radiology Findings] : radiology findings [Discharge Med List] : discharge medication list [Med Reconciliation] : medication reconciliation has been completed [Patient Contacted By: ____] : and contacted by [unfilled] [FreeTextEntry2] : s/p multiple syncopal epidsodes was in hospital treated for FUO, lactic acidosis, IV fluids, viral panl normal\par Neuro saw pt and cleared for discharge\par I D  Brief\par \par

## 2018-12-10 LAB
ALBUMIN SERPL ELPH-MCNC: 4.2 G/DL
ANION GAP SERPL CALC-SCNC: 12 MMOL/L
BASOPHILS # BLD AUTO: 0.02 K/UL
BASOPHILS NFR BLD AUTO: 0.4 %
BUN SERPL-MCNC: 16 MG/DL
CALCIUM SERPL-MCNC: 9.2 MG/DL
CHLORIDE SERPL-SCNC: 104 MMOL/L
CO2 SERPL-SCNC: 24 MMOL/L
CREAT SERPL-MCNC: 0.98 MG/DL
EOSINOPHIL # BLD AUTO: 0.07 K/UL
EOSINOPHIL NFR BLD AUTO: 1.5 %
GLUCOSE SERPL-MCNC: 135 MG/DL
HCT VFR BLD CALC: 35 %
HGB BLD-MCNC: 11.6 G/DL
IMM GRANULOCYTES NFR BLD AUTO: 0.8 %
LACTATE BLDA-MCNC: 2.7 MMOL/L
LYMPHOCYTES # BLD AUTO: 1.67 K/UL
LYMPHOCYTES NFR BLD AUTO: 35.2 %
MAN DIFF?: NORMAL
MCHC RBC-ENTMCNC: 28.4 PG
MCHC RBC-ENTMCNC: 33.1 GM/DL
MCV RBC AUTO: 85.8 FL
MONOCYTES # BLD AUTO: 0.57 K/UL
MONOCYTES NFR BLD AUTO: 12 %
NEUTROPHILS # BLD AUTO: 2.37 K/UL
NEUTROPHILS NFR BLD AUTO: 50.1 %
PHOSPHATE SERPL-MCNC: 2.9 MG/DL
PLATELET # BLD AUTO: 253 K/UL
POTASSIUM SERPL-SCNC: 4.7 MMOL/L
RBC # BLD: 4.08 M/UL
RBC # FLD: 13.6 %
SODIUM SERPL-SCNC: 140 MMOL/L
WBC # FLD AUTO: 4.74 K/UL

## 2019-01-03 ENCOUNTER — APPOINTMENT (OUTPATIENT)
Dept: INTERNAL MEDICINE | Facility: CLINIC | Age: 69
End: 2019-01-03
Payer: MEDICARE

## 2019-01-03 VITALS
RESPIRATION RATE: 16 BRPM | WEIGHT: 207 LBS | BODY MASS INDEX: 30.66 KG/M2 | SYSTOLIC BLOOD PRESSURE: 120 MMHG | HEART RATE: 78 BPM | DIASTOLIC BLOOD PRESSURE: 78 MMHG | HEIGHT: 69 IN

## 2019-01-03 DIAGNOSIS — Z87.09 PERSONAL HISTORY OF OTHER DISEASES OF THE RESPIRATORY SYSTEM: ICD-10-CM

## 2019-01-03 PROCEDURE — 99214 OFFICE O/P EST MOD 30 MIN: CPT

## 2019-01-03 NOTE — HISTORY OF PRESENT ILLNESS
[Congestion] : congestion [Cold Symptoms] : cold symptoms [Sore Throat] : sore throat [Moderate] : moderate [___ Days ago] : [unfilled] days ago [Paroxysmal] : paroxysmal [Cough] : cough [Anorexia] : anorexia [Stable] : stable [FreeTextEntry8] : was taking asprin, alkaseltzer no help

## 2019-02-15 LAB
25(OH)D3 SERPL-MCNC: 36.3 NG/ML
APPEARANCE: CLEAR
BACTERIA: NEGATIVE
BASOPHILS # BLD AUTO: 0.03 K/UL
BASOPHILS NFR BLD AUTO: 0.5 %
BILIRUBIN URINE: NEGATIVE
BLOOD URINE: NEGATIVE
COLOR: YELLOW
EOSINOPHIL # BLD AUTO: 0.06 K/UL
EOSINOPHIL NFR BLD AUTO: 1 %
GLUCOSE QUALITATIVE U: NEGATIVE MG/DL
HBA1C MFR BLD HPLC: 6.1 %
HCT VFR BLD CALC: 45.3 %
HGB BLD-MCNC: 14.5 G/DL
HYALINE CASTS: 0 /LPF
IMM GRANULOCYTES NFR BLD AUTO: 1.2 %
KETONES URINE: NEGATIVE
LEUKOCYTE ESTERASE URINE: NEGATIVE
LYMPHOCYTES # BLD AUTO: 2.11 K/UL
LYMPHOCYTES NFR BLD AUTO: 35.6 %
MAN DIFF?: NORMAL
MCHC RBC-ENTMCNC: 29.4 PG
MCHC RBC-ENTMCNC: 32 GM/DL
MCV RBC AUTO: 91.9 FL
MICROSCOPIC-UA: NORMAL
MONOCYTES # BLD AUTO: 0.76 K/UL
MONOCYTES NFR BLD AUTO: 12.8 %
NEUTROPHILS # BLD AUTO: 2.89 K/UL
NEUTROPHILS NFR BLD AUTO: 48.9 %
NITRITE URINE: NEGATIVE
PH URINE: 5.5
PLATELET # BLD AUTO: 243 K/UL
PROTEIN URINE: NEGATIVE MG/DL
RBC # BLD: 4.93 M/UL
RBC # FLD: 13.8 %
RED BLOOD CELLS URINE: 2 /HPF
SPECIFIC GRAVITY URINE: 1.02
SQUAMOUS EPITHELIAL CELLS: 0 /HPF
UROBILINOGEN URINE: NEGATIVE MG/DL
WBC # FLD AUTO: 5.92 K/UL
WHITE BLOOD CELLS URINE: 0 /HPF

## 2019-02-19 LAB
ALBUMIN SERPL ELPH-MCNC: 4.9 G/DL
ALP BLD-CCNC: 104 U/L
ALT SERPL-CCNC: 45 U/L
ANION GAP SERPL CALC-SCNC: 10 MMOL/L
AST SERPL-CCNC: 33 U/L
BILIRUB SERPL-MCNC: 0.2 MG/DL
BUN SERPL-MCNC: 18 MG/DL
CALCIUM SERPL-MCNC: 9.2 MG/DL
CHLORIDE SERPL-SCNC: 102 MMOL/L
CHOLEST SERPL-MCNC: 150 MG/DL
CHOLEST/HDLC SERPL: 3.8 RATIO
CO2 SERPL-SCNC: 27 MMOL/L
CREAT SERPL-MCNC: 0.92 MG/DL
FERRITIN SERPL-MCNC: 71 NG/ML
FOLATE SERPL-MCNC: 14.1 NG/ML
GLUCOSE SERPL-MCNC: 170 MG/DL
HDLC SERPL-MCNC: 39 MG/DL
IRON SATN MFR SERPL: 24 %
IRON SERPL-MCNC: 92 UG/DL
LACTATE BLDA-MCNC: 2.6 MMOL/L
LDLC SERPL CALC-MCNC: 78 MG/DL
POTASSIUM SERPL-SCNC: 4.8 MMOL/L
PROT SERPL-MCNC: 7.2 G/DL
PSA SERPL-MCNC: 3.77 NG/ML
SODIUM SERPL-SCNC: 139 MMOL/L
TIBC SERPL-MCNC: 388 UG/DL
TRIGL SERPL-MCNC: 164 MG/DL
TSH SERPL-ACNC: 3.22 UIU/ML
UIBC SERPL-MCNC: 296 UG/DL
VIT B12 SERPL-MCNC: 298 PG/ML

## 2019-02-25 ENCOUNTER — MEDICATION RENEWAL (OUTPATIENT)
Age: 69
End: 2019-02-25

## 2019-02-27 ENCOUNTER — MEDICATION RENEWAL (OUTPATIENT)
Age: 69
End: 2019-02-27

## 2019-03-07 ENCOUNTER — APPOINTMENT (OUTPATIENT)
Dept: INTERNAL MEDICINE | Facility: CLINIC | Age: 69
End: 2019-03-07
Payer: MEDICARE

## 2019-03-07 VITALS
DIASTOLIC BLOOD PRESSURE: 70 MMHG | RESPIRATION RATE: 16 BRPM | HEART RATE: 76 BPM | HEIGHT: 69 IN | SYSTOLIC BLOOD PRESSURE: 120 MMHG

## 2019-03-07 DIAGNOSIS — R73.03 PREDIABETES.: ICD-10-CM

## 2019-03-07 PROCEDURE — 99214 OFFICE O/P EST MOD 30 MIN: CPT

## 2019-11-14 ENCOUNTER — TRANSCRIPTION ENCOUNTER (OUTPATIENT)
Age: 69
End: 2019-11-14

## 2019-11-27 PROBLEM — Z28.21 REFUSED INFLUENZA VACCINE: Status: ACTIVE | Noted: 2018-01-09

## 2020-01-30 ENCOUNTER — APPOINTMENT (OUTPATIENT)
Dept: INTERNAL MEDICINE | Facility: CLINIC | Age: 70
End: 2020-01-30
Payer: MEDICARE

## 2020-01-30 ENCOUNTER — FORM ENCOUNTER (OUTPATIENT)
Age: 70
End: 2020-01-30

## 2020-01-30 VITALS
OXYGEN SATURATION: 99 % | RESPIRATION RATE: 16 BRPM | HEART RATE: 88 BPM | SYSTOLIC BLOOD PRESSURE: 146 MMHG | BODY MASS INDEX: 30.66 KG/M2 | DIASTOLIC BLOOD PRESSURE: 86 MMHG | WEIGHT: 207 LBS | HEIGHT: 69 IN | TEMPERATURE: 97.3 F

## 2020-01-30 DIAGNOSIS — M79.662 PAIN IN LEFT LOWER LEG: ICD-10-CM

## 2020-01-30 PROCEDURE — 99213 OFFICE O/P EST LOW 20 MIN: CPT

## 2020-01-30 NOTE — PHYSICAL EXAM
[No Acute Distress] : no acute distress [Well Nourished] : well nourished [Well Developed] : well developed [Normal Sclera/Conjunctiva] : normal sclera/conjunctiva [Well-Appearing] : well-appearing [PERRL] : pupils equal round and reactive to light [EOMI] : extraocular movements intact [Normal Outer Ear/Nose] : the outer ears and nose were normal in appearance [No Lymphadenopathy] : no lymphadenopathy [Normal Oropharynx] : the oropharynx was normal [No JVD] : no jugular venous distention [Thyroid Normal, No Nodules] : the thyroid was normal and there were no nodules present [Supple] : supple [No Respiratory Distress] : no respiratory distress  [No Accessory Muscle Use] : no accessory muscle use [Clear to Auscultation] : lungs were clear to auscultation bilaterally [Normal Rate] : normal rate  [Normal S1, S2] : normal S1 and S2 [Regular Rhythm] : with a regular rhythm [No Abdominal Bruit] : a ~M bruit was not heard ~T in the abdomen [No Murmur] : no murmur heard [No Carotid Bruits] : no carotid bruits [No Varicosities] : no varicosities [Pedal Pulses Present] : the pedal pulses are present [No Palpable Aorta] : no palpable aorta [No Extremity Clubbing/Cyanosis] : no extremity clubbing/cyanosis [No Edema] : there was no peripheral edema [Soft] : abdomen soft [Non Tender] : non-tender [Non-distended] : non-distended [No Masses] : no abdominal mass palpated [Normal Bowel Sounds] : normal bowel sounds [No HSM] : no HSM [No CVA Tenderness] : no CVA  tenderness [Normal Posterior Cervical Nodes] : no posterior cervical lymphadenopathy [Normal Anterior Cervical Nodes] : no anterior cervical lymphadenopathy [Grossly Normal Strength/Tone] : grossly normal strength/tone [No Spinal Tenderness] : no spinal tenderness [No Joint Swelling] : no joint swelling [No Rash] : no rash [Coordination Grossly Intact] : coordination grossly intact [No Focal Deficits] : no focal deficits [Normal Gait] : normal gait [Normal Insight/Judgement] : insight and judgment were intact [Deep Tendon Reflexes (DTR)] : deep tendon reflexes were 2+ and symmetric [Normal Affect] : the affect was normal

## 2020-01-30 NOTE — HISTORY OF PRESENT ILLNESS
[FreeTextEntry8] : LEFT CALF PAIN\par -pain when walking\par -no swelling\par -warm\par -painful\par \par -no OTC meds\par -no txs\par

## 2020-01-31 ENCOUNTER — APPOINTMENT (OUTPATIENT)
Dept: ULTRASOUND IMAGING | Facility: HOSPITAL | Age: 70
End: 2020-01-31
Payer: MEDICARE

## 2020-01-31 ENCOUNTER — OUTPATIENT (OUTPATIENT)
Dept: OUTPATIENT SERVICES | Facility: HOSPITAL | Age: 70
LOS: 1 days | End: 2020-01-31
Payer: MEDICARE

## 2020-01-31 DIAGNOSIS — Z96.641 PRESENCE OF RIGHT ARTIFICIAL HIP JOINT: Chronic | ICD-10-CM

## 2020-01-31 DIAGNOSIS — H33.053 TOTAL RETINAL DETACHMENT, BILATERAL: Chronic | ICD-10-CM

## 2020-01-31 DIAGNOSIS — M79.662 PAIN IN LEFT LOWER LEG: ICD-10-CM

## 2020-01-31 DIAGNOSIS — Z96.642 PRESENCE OF LEFT ARTIFICIAL HIP JOINT: Chronic | ICD-10-CM

## 2020-01-31 DIAGNOSIS — Z90.49 ACQUIRED ABSENCE OF OTHER SPECIFIED PARTS OF DIGESTIVE TRACT: Chronic | ICD-10-CM

## 2020-01-31 PROCEDURE — 93971 EXTREMITY STUDY: CPT

## 2020-01-31 PROCEDURE — 93971 EXTREMITY STUDY: CPT | Mod: 26,LT

## 2020-03-28 ENCOUNTER — RX RENEWAL (OUTPATIENT)
Age: 70
End: 2020-03-28

## 2020-07-10 ENCOUNTER — APPOINTMENT (OUTPATIENT)
Dept: INTERNAL MEDICINE | Facility: CLINIC | Age: 70
End: 2020-07-10
Payer: MEDICARE

## 2020-07-10 VITALS
SYSTOLIC BLOOD PRESSURE: 120 MMHG | DIASTOLIC BLOOD PRESSURE: 70 MMHG | HEIGHT: 69 IN | HEART RATE: 74 BPM | WEIGHT: 204 LBS | BODY MASS INDEX: 30.21 KG/M2 | TEMPERATURE: 97.5 F | OXYGEN SATURATION: 98 % | RESPIRATION RATE: 16 BRPM

## 2020-07-10 DIAGNOSIS — I87.8 OTHER SPECIFIED DISORDERS OF VEINS: ICD-10-CM

## 2020-07-10 DIAGNOSIS — Z00.00 ENCOUNTER FOR GENERAL ADULT MEDICAL EXAMINATION W/OUT ABNORMAL FINDINGS: ICD-10-CM

## 2020-07-10 DIAGNOSIS — R25.2 CRAMP AND SPASM: ICD-10-CM

## 2020-07-10 PROCEDURE — 99213 OFFICE O/P EST LOW 20 MIN: CPT

## 2020-07-10 RX ORDER — AMOXICILLIN AND CLAVULANATE POTASSIUM 875; 125 MG/1; MG/1
875-125 TABLET, COATED ORAL
Qty: 14 | Refills: 0 | Status: DISCONTINUED | COMMUNITY
Start: 2019-04-30 | End: 2020-07-10

## 2020-07-10 RX ORDER — DICLOFENAC SODIUM 10 MG/G
1 GEL TOPICAL DAILY
Qty: 1 | Refills: 3 | Status: DISCONTINUED | COMMUNITY
Start: 2020-01-30 | End: 2020-07-10

## 2020-07-10 RX ORDER — FLUTICASONE PROPIONATE 50 UG/1
50 SPRAY, METERED NASAL TWICE DAILY
Qty: 1 | Refills: 5 | Status: DISCONTINUED | COMMUNITY
Start: 2019-01-03 | End: 2020-07-10

## 2020-07-10 RX ORDER — AZITHROMYCIN 250 MG/1
250 TABLET, FILM COATED ORAL
Qty: 1 | Refills: 1 | Status: DISCONTINUED | COMMUNITY
Start: 2019-01-03 | End: 2020-07-10

## 2020-07-10 NOTE — PHYSICAL EXAM
[No Acute Distress] : no acute distress [Well Nourished] : well nourished [Well Developed] : well developed [Well-Appearing] : well-appearing [Normal Sclera/Conjunctiva] : normal sclera/conjunctiva [PERRL] : pupils equal round and reactive to light [EOMI] : extraocular movements intact [Normal Outer Ear/Nose] : the outer ears and nose were normal in appearance [Normal Oropharynx] : the oropharynx was normal [No JVD] : no jugular venous distention [No Lymphadenopathy] : no lymphadenopathy [Supple] : supple [Thyroid Normal, No Nodules] : the thyroid was normal and there were no nodules present [No Respiratory Distress] : no respiratory distress  [No Accessory Muscle Use] : no accessory muscle use [Clear to Auscultation] : lungs were clear to auscultation bilaterally [Normal Rate] : normal rate  [Regular Rhythm] : with a regular rhythm [Normal S1, S2] : normal S1 and S2 [No Murmur] : no murmur heard [No Carotid Bruits] : no carotid bruits [No Abdominal Bruit] : a ~M bruit was not heard ~T in the abdomen [No Varicosities] : no varicosities [Pedal Pulses Present] : the pedal pulses are present [No Edema] : there was no peripheral edema [No Extremity Clubbing/Cyanosis] : no extremity clubbing/cyanosis [No Palpable Aorta] : no palpable aorta [Non Tender] : non-tender [Non-distended] : non-distended [Soft] : abdomen soft [No HSM] : no HSM [Normal Bowel Sounds] : normal bowel sounds [No Masses] : no abdominal mass palpated [Normal Anterior Cervical Nodes] : no anterior cervical lymphadenopathy [Normal Posterior Cervical Nodes] : no posterior cervical lymphadenopathy [No CVA Tenderness] : no CVA  tenderness [Grossly Normal Strength/Tone] : grossly normal strength/tone [No Spinal Tenderness] : no spinal tenderness [No Joint Swelling] : no joint swelling [Coordination Grossly Intact] : coordination grossly intact [No Rash] : no rash [No Focal Deficits] : no focal deficits [Deep Tendon Reflexes (DTR)] : deep tendon reflexes were 2+ and symmetric [Normal Gait] : normal gait [Normal Affect] : the affect was normal [Normal Insight/Judgement] : insight and judgment were intact

## 2020-07-10 NOTE — HISTORY OF PRESENT ILLNESS
[FreeTextEntry1] : Follow up  [de-identified] : Doing well, socially distancing\par \par Foot pain had skin breaks and color changes Left > right \par \par calf pain left LE\par -"tore" his left calf\par -can only walk half mile gets fatigue\par \par \par \par

## 2020-07-20 LAB
25(OH)D3 SERPL-MCNC: 36.4 NG/ML
ALBUMIN SERPL ELPH-MCNC: 4.6 G/DL
ALP BLD-CCNC: 104 U/L
ALT SERPL-CCNC: 32 U/L
ANION GAP SERPL CALC-SCNC: 13 MMOL/L
APPEARANCE: CLEAR
AST SERPL-CCNC: 27 U/L
BACTERIA: NEGATIVE
BASOPHILS # BLD AUTO: 0.03 K/UL
BASOPHILS NFR BLD AUTO: 0.6 %
BILIRUB SERPL-MCNC: 0.3 MG/DL
BILIRUBIN URINE: NEGATIVE
BLOOD URINE: NEGATIVE
BUN SERPL-MCNC: 17 MG/DL
CALCIUM SERPL-MCNC: 9 MG/DL
CHLORIDE SERPL-SCNC: 103 MMOL/L
CHOLEST SERPL-MCNC: 177 MG/DL
CHOLEST/HDLC SERPL: 4.5 RATIO
CO2 SERPL-SCNC: 25 MMOL/L
COLOR: YELLOW
CREAT SERPL-MCNC: 0.96 MG/DL
EOSINOPHIL # BLD AUTO: 0.08 K/UL
EOSINOPHIL NFR BLD AUTO: 1.5 %
ESTIMATED AVERAGE GLUCOSE: 137 MG/DL
GLUCOSE QUALITATIVE U: NEGATIVE
GLUCOSE SERPL-MCNC: 116 MG/DL
HBA1C MFR BLD HPLC: 6.4 %
HCT VFR BLD CALC: 39 %
HDLC SERPL-MCNC: 39 MG/DL
HEMOCCULT STL QL IA: NEGATIVE
HGB BLD-MCNC: 12.9 G/DL
HYALINE CASTS: 0 /LPF
IMM GRANULOCYTES NFR BLD AUTO: 0.8 %
KETONES URINE: NEGATIVE
LDLC SERPL CALC-MCNC: 105 MG/DL
LEUKOCYTE ESTERASE URINE: NEGATIVE
LYMPHOCYTES # BLD AUTO: 1.83 K/UL
LYMPHOCYTES NFR BLD AUTO: 34.5 %
MAN DIFF?: NORMAL
MCHC RBC-ENTMCNC: 29.3 PG
MCHC RBC-ENTMCNC: 33.1 GM/DL
MCV RBC AUTO: 88.6 FL
MICROSCOPIC-UA: NORMAL
MONOCYTES # BLD AUTO: 0.69 K/UL
MONOCYTES NFR BLD AUTO: 13 %
NEUTROPHILS # BLD AUTO: 2.64 K/UL
NEUTROPHILS NFR BLD AUTO: 49.6 %
NITRITE URINE: NEGATIVE
PH URINE: 5.5
PLATELET # BLD AUTO: 218 K/UL
POTASSIUM SERPL-SCNC: 4.8 MMOL/L
PROT SERPL-MCNC: 6.9 G/DL
PROTEIN URINE: NEGATIVE
PSA SERPL-MCNC: 3.79 NG/ML
RBC # BLD: 4.4 M/UL
RBC # FLD: 13.4 %
RED BLOOD CELLS URINE: 2 /HPF
SARS-COV-2 IGG SERPL IA-ACNC: <0.1 INDEX
SARS-COV-2 IGG SERPL QL IA: NEGATIVE
SODIUM SERPL-SCNC: 141 MMOL/L
SPECIFIC GRAVITY URINE: 1.02
SQUAMOUS EPITHELIAL CELLS: 0 /HPF
TRIGL SERPL-MCNC: 161 MG/DL
TSH SERPL-ACNC: 2.89 UIU/ML
UROBILINOGEN URINE: NORMAL
WBC # FLD AUTO: 5.31 K/UL
WHITE BLOOD CELLS URINE: 0 /HPF

## 2020-10-01 ENCOUNTER — RX RENEWAL (OUTPATIENT)
Age: 70
End: 2020-10-01

## 2020-12-21 PROBLEM — Z87.09 HISTORY OF ACUTE BRONCHITIS: Status: RESOLVED | Noted: 2019-01-03 | Resolved: 2020-12-21

## 2021-07-12 ENCOUNTER — RX RENEWAL (OUTPATIENT)
Age: 71
End: 2021-07-12

## 2021-10-06 PROBLEM — Z28.21 REFUSED PNEUMOCOCCAL VACCINATION: Status: ACTIVE | Noted: 2018-01-09

## 2021-10-11 ENCOUNTER — APPOINTMENT (OUTPATIENT)
Dept: RADIOLOGY | Facility: HOSPITAL | Age: 71
End: 2021-10-11

## 2021-10-11 ENCOUNTER — APPOINTMENT (OUTPATIENT)
Dept: INTERNAL MEDICINE | Facility: CLINIC | Age: 71
End: 2021-10-11
Payer: MEDICARE

## 2021-10-11 ENCOUNTER — RESULT REVIEW (OUTPATIENT)
Age: 71
End: 2021-10-11

## 2021-10-11 ENCOUNTER — OUTPATIENT (OUTPATIENT)
Dept: OUTPATIENT SERVICES | Facility: HOSPITAL | Age: 71
LOS: 1 days | End: 2021-10-11
Payer: MEDICARE

## 2021-10-11 ENCOUNTER — NON-APPOINTMENT (OUTPATIENT)
Age: 71
End: 2021-10-11

## 2021-10-11 VITALS
DIASTOLIC BLOOD PRESSURE: 60 MMHG | RESPIRATION RATE: 16 BRPM | OXYGEN SATURATION: 97 % | WEIGHT: 211 LBS | TEMPERATURE: 97.6 F | BODY MASS INDEX: 31.25 KG/M2 | HEART RATE: 94 BPM | SYSTOLIC BLOOD PRESSURE: 126 MMHG | HEIGHT: 69 IN

## 2021-10-11 VITALS
SYSTOLIC BLOOD PRESSURE: 126 MMHG | WEIGHT: 211 LBS | BODY MASS INDEX: 31.25 KG/M2 | DIASTOLIC BLOOD PRESSURE: 60 MMHG | HEIGHT: 69 IN | TEMPERATURE: 97.6 F | RESPIRATION RATE: 16 BRPM | HEART RATE: 94 BPM | OXYGEN SATURATION: 97 %

## 2021-10-11 DIAGNOSIS — Z00.00 ENCOUNTER FOR GENERAL ADULT MEDICAL EXAMINATION W/OUT ABNORMAL FINDINGS: ICD-10-CM

## 2021-10-11 DIAGNOSIS — H33.053 TOTAL RETINAL DETACHMENT, BILATERAL: Chronic | ICD-10-CM

## 2021-10-11 DIAGNOSIS — Z00.8 ENCOUNTER FOR OTHER GENERAL EXAMINATION: ICD-10-CM

## 2021-10-11 DIAGNOSIS — Z96.642 PRESENCE OF LEFT ARTIFICIAL HIP JOINT: Chronic | ICD-10-CM

## 2021-10-11 DIAGNOSIS — Z90.49 ACQUIRED ABSENCE OF OTHER SPECIFIED PARTS OF DIGESTIVE TRACT: Chronic | ICD-10-CM

## 2021-10-11 DIAGNOSIS — Z96.641 PRESENCE OF RIGHT ARTIFICIAL HIP JOINT: Chronic | ICD-10-CM

## 2021-10-11 PROCEDURE — G0444 DEPRESSION SCREEN ANNUAL: CPT

## 2021-10-11 PROCEDURE — G0442 ANNUAL ALCOHOL SCREEN 15 MIN: CPT | Mod: 59

## 2021-10-11 PROCEDURE — 71046 X-RAY EXAM CHEST 2 VIEWS: CPT

## 2021-10-11 PROCEDURE — 93000 ELECTROCARDIOGRAM COMPLETE: CPT | Mod: 59

## 2021-10-11 PROCEDURE — 71046 X-RAY EXAM CHEST 2 VIEWS: CPT | Mod: 26

## 2021-10-11 PROCEDURE — G0439: CPT

## 2021-10-11 PROCEDURE — G0008: CPT

## 2021-10-11 PROCEDURE — 90662 IIV NO PRSV INCREASED AG IM: CPT

## 2021-10-11 NOTE — HISTORY OF PRESENT ILLNESS
[FreeTextEntry1] : Here for annual [de-identified] : Wife sent w / list\par \par >Heart: good EKG\par -no CP, SOB\par \par >Spots on back \par -sees derm\par >weight and concern for BP\par >Wants lyme test and flu shot\par >?PAD\par > SOB\par sometimes, more noticed by wife\par denies more of his wife\par denies SINGH\par no palpitations\par > Pale white stools\par > colonoscopy\par

## 2021-10-11 NOTE — HEALTH RISK ASSESSMENT
[Very Good] : ~his/her~  mood as very good [] : No [No] : In the past 12 months have you used drugs other than those required for medical reasons? No [No falls in past year] : Patient reported no falls in the past year [0] : 2) Feeling down, depressed, or hopeless: Not at all (0) [PHQ-2 Negative - No further assessment needed] : PHQ-2 Negative - No further assessment needed [de-identified] : no0ne [de-identified] : none [Audit-CScore] : 0 [de-identified] : [place] [facility] [place] [de-identified] : some sweets [[id number]] : 0 [Patient reported colonoscopy was normal] : Patient reported colonoscopy was normal [HIV test declined] : HIV test declined [Hepatitis C test declined] : Hepatitis C test declined [Change in mental status noted] : No change in mental status noted [Language] : denies difficulty with language [Behavior] : denies difficulty with behavior [Learning/Retaining New Information] : denies difficulty learning/retaining new information [Handling Complex Tasks] : denies difficulty handling complex tasks [Reasoning] : denies difficulty with reasoning [Spatial Ability and Orientation] : denies difficulty with spatial ability and orientation [None] : None [With Significant Other] : lives with significant other [Retired] : retired [] :  [# Of Children ___] : has [unfilled] children [Sexually Active] : sexually active [Fully functional (bathing, dressing, toileting, transferring, walking, feeding)] : Fully functional (bathing, dressing, toileting, transferring, walking, feeding) [Fully functional (using the telephone, shopping, preparing meals, housekeeping, doing laundry, using] : Fully functional and needs no help or supervision to perform IADLs (using the telephone, shopping, preparing meals, housekeeping, doing laundry, using transportation, managing medications and managing finances) [Reports changes in hearing] : Reports no changes in hearing [Reports changes in vision] : Reports no changes in vision [Reports changes in dental health] : Reports no changes in dental health [ColonoscopyDate] : 2014 [FreeTextEntry3] : 2 grandsons

## 2021-10-12 ENCOUNTER — LABORATORY RESULT (OUTPATIENT)
Age: 71
End: 2021-10-12

## 2021-11-05 LAB
25(OH)D3 SERPL-MCNC: 33.6 NG/ML
ALBUMIN SERPL ELPH-MCNC: 4.6 G/DL
ALP BLD-CCNC: 105 U/L
ALT SERPL-CCNC: 40 U/L
ANION GAP SERPL CALC-SCNC: 13 MMOL/L
APPEARANCE: CLEAR
AST SERPL-CCNC: 26 U/L
BACTERIA: NEGATIVE
BASOPHILS # BLD AUTO: 0.04 K/UL
BASOPHILS NFR BLD AUTO: 0.6 %
BILIRUB SERPL-MCNC: 0.4 MG/DL
BILIRUBIN URINE: NEGATIVE
BLOOD URINE: NEGATIVE
BUN SERPL-MCNC: 19 MG/DL
CALCIUM SERPL-MCNC: 9.3 MG/DL
CHLORIDE SERPL-SCNC: 105 MMOL/L
CHOLEST SERPL-MCNC: 173 MG/DL
CO2 SERPL-SCNC: 23 MMOL/L
COLOR: YELLOW
CREAT SERPL-MCNC: 0.94 MG/DL
EOSINOPHIL # BLD AUTO: 0.12 K/UL
EOSINOPHIL NFR BLD AUTO: 1.8 %
ESTIMATED AVERAGE GLUCOSE: 146 MG/DL
GLUCOSE QUALITATIVE U: NEGATIVE
GLUCOSE SERPL-MCNC: 113 MG/DL
HBA1C MFR BLD HPLC: 6.7 %
HCT VFR BLD CALC: 42.1 %
HDLC SERPL-MCNC: 37 MG/DL
HGB BLD-MCNC: 14.6 G/DL
HYALINE CASTS: 1 /LPF
IMM GRANULOCYTES NFR BLD AUTO: 0.9 %
KETONES URINE: NEGATIVE
LDLC SERPL CALC-MCNC: 89 MG/DL
LEUKOCYTE ESTERASE URINE: NEGATIVE
LYMPHOCYTES # BLD AUTO: 1.74 K/UL
LYMPHOCYTES NFR BLD AUTO: 26.3 %
MAN DIFF?: NORMAL
MCHC RBC-ENTMCNC: 30.8 PG
MCHC RBC-ENTMCNC: 34.7 GM/DL
MCV RBC AUTO: 88.8 FL
MICROSCOPIC-UA: NORMAL
MONOCYTES # BLD AUTO: 0.89 K/UL
MONOCYTES NFR BLD AUTO: 13.4 %
NEUTROPHILS # BLD AUTO: 3.77 K/UL
NEUTROPHILS NFR BLD AUTO: 57 %
NITRITE URINE: NEGATIVE
NONHDLC SERPL-MCNC: 136 MG/DL
PH URINE: 5.5
PLATELET # BLD AUTO: 227 K/UL
POTASSIUM SERPL-SCNC: 5 MMOL/L
PROT SERPL-MCNC: 6.8 G/DL
PROTEIN URINE: NORMAL
PSA SERPL-MCNC: 5.18 NG/ML
RBC # BLD: 4.74 M/UL
RBC # FLD: 13.4 %
RED BLOOD CELLS URINE: 5 /HPF
SODIUM SERPL-SCNC: 141 MMOL/L
SPECIFIC GRAVITY URINE: 1.03
SQUAMOUS EPITHELIAL CELLS: 0 /HPF
TRIGL SERPL-MCNC: 234 MG/DL
TSH SERPL-ACNC: 1.96 UIU/ML
UROBILINOGEN URINE: NORMAL
WBC # FLD AUTO: 6.62 K/UL
WHITE BLOOD CELLS URINE: 1 /HPF

## 2022-01-26 ENCOUNTER — RX RENEWAL (OUTPATIENT)
Age: 72
End: 2022-01-26

## 2022-04-29 ENCOUNTER — RX RENEWAL (OUTPATIENT)
Age: 72
End: 2022-04-29

## 2022-05-16 ENCOUNTER — APPOINTMENT (OUTPATIENT)
Dept: INTERNAL MEDICINE | Facility: CLINIC | Age: 72
End: 2022-05-16
Payer: MEDICARE

## 2022-05-16 DIAGNOSIS — R09.81 NASAL CONGESTION: ICD-10-CM

## 2022-05-16 PROCEDURE — 99213 OFFICE O/P EST LOW 20 MIN: CPT | Mod: 95

## 2022-05-16 RX ORDER — AMOXICILLIN AND CLAVULANATE POTASSIUM 875; 125 MG/1; MG/1
875-125 TABLET, COATED ORAL
Qty: 14 | Refills: 0 | Status: COMPLETED | COMMUNITY
Start: 2022-05-16 | End: 2022-05-23

## 2022-05-16 NOTE — REVIEW OF SYSTEMS
[Nasal Discharge] : nasal discharge [Sore Throat] : sore throat [Hoarseness] : hoarseness [Cough] : cough [Dyspnea on Exertion] : dyspnea on exertion [Fever] : no fever [Chills] : no chills [Earache] : no earache [Nosebleeds] : no nosebleeds [Postnasal Drip] : no postnasal drip

## 2022-05-16 NOTE — PHYSICAL EXAM
[No Acute Distress] : no acute distress [Well Nourished] : well nourished [Well Developed] : well developed [Well-Appearing] : well-appearing [Normal Sclera/Conjunctiva] : normal sclera/conjunctiva [PERRL] : pupils equal round and reactive to light [No Respiratory Distress] : no respiratory distress

## 2022-05-16 NOTE — HISTORY OF PRESENT ILLNESS
[FreeTextEntry8] : SINUS SYMPTOMS\par stuffed up\par took booster last week\par trouble breathing through nose and also has deep hacking cough - nose so clogged using mouth\par eyes caked up\par \par home test negative for COVID - 3 days

## 2022-08-08 ENCOUNTER — RX RENEWAL (OUTPATIENT)
Age: 72
End: 2022-08-08

## 2022-10-19 ENCOUNTER — RX RENEWAL (OUTPATIENT)
Age: 72
End: 2022-10-19

## 2022-11-11 ENCOUNTER — APPOINTMENT (OUTPATIENT)
Dept: INTERNAL MEDICINE | Facility: CLINIC | Age: 72
End: 2022-11-11

## 2022-11-11 VITALS
TEMPERATURE: 96.9 F | HEIGHT: 69.75 IN | HEART RATE: 77 BPM | WEIGHT: 201 LBS | RESPIRATION RATE: 18 BRPM | SYSTOLIC BLOOD PRESSURE: 130 MMHG | DIASTOLIC BLOOD PRESSURE: 70 MMHG | BODY MASS INDEX: 29.1 KG/M2 | OXYGEN SATURATION: 98 %

## 2022-11-11 DIAGNOSIS — R05.9 COUGH, UNSPECIFIED: ICD-10-CM

## 2022-11-11 DIAGNOSIS — R09.82 POSTNASAL DRIP: ICD-10-CM

## 2022-11-11 DIAGNOSIS — Z23 ENCOUNTER FOR IMMUNIZATION: ICD-10-CM

## 2022-11-11 DIAGNOSIS — R97.20 ELEVATED PROSTATE, SPECIFIC ANTIGEN [PSA]: ICD-10-CM

## 2022-11-11 PROCEDURE — 99214 OFFICE O/P EST MOD 30 MIN: CPT | Mod: 25

## 2022-11-11 PROCEDURE — 90662 IIV NO PRSV INCREASED AG IM: CPT

## 2022-11-11 PROCEDURE — G0008: CPT

## 2022-12-29 ENCOUNTER — NON-APPOINTMENT (OUTPATIENT)
Age: 72
End: 2022-12-29

## 2022-12-29 LAB
25(OH)D3 SERPL-MCNC: 44.8 NG/ML
ALBUMIN SERPL ELPH-MCNC: 4.5 G/DL
ALP BLD-CCNC: 101 U/L
ALT SERPL-CCNC: 29 U/L
ANION GAP SERPL CALC-SCNC: 12 MMOL/L
APPEARANCE: CLEAR
AST SERPL-CCNC: 26 U/L
BACTERIA: NEGATIVE
BASOPHILS # BLD AUTO: 0.04 K/UL
BASOPHILS NFR BLD AUTO: 0.7 %
BILIRUB SERPL-MCNC: 0.4 MG/DL
BILIRUBIN URINE: NEGATIVE
BLOOD URINE: NEGATIVE
BUN SERPL-MCNC: 16 MG/DL
CALCIUM SERPL-MCNC: 9.2 MG/DL
CHLORIDE SERPL-SCNC: 103 MMOL/L
CHOLEST SERPL-MCNC: 194 MG/DL
CO2 SERPL-SCNC: 23 MMOL/L
COLOR: NORMAL
CREAT SERPL-MCNC: 0.95 MG/DL
EGFR: 84 ML/MIN/1.73M2
EOSINOPHIL # BLD AUTO: 0.17 K/UL
EOSINOPHIL NFR BLD AUTO: 3.1 %
ESTIMATED AVERAGE GLUCOSE: 131 MG/DL
GLUCOSE QUALITATIVE U: NEGATIVE
GLUCOSE SERPL-MCNC: 131 MG/DL
HBA1C MFR BLD HPLC: 6.2 %
HCT VFR BLD CALC: 41.8 %
HDLC SERPL-MCNC: 51 MG/DL
HGB BLD-MCNC: 14.5 G/DL
HYALINE CASTS: 0 /LPF
IMM GRANULOCYTES NFR BLD AUTO: 0.7 %
KETONES URINE: NEGATIVE
LDLC SERPL CALC-MCNC: 116 MG/DL
LEUKOCYTE ESTERASE URINE: NEGATIVE
LYMPHOCYTES # BLD AUTO: 1.66 K/UL
LYMPHOCYTES NFR BLD AUTO: 29.9 %
MAN DIFF?: NORMAL
MCHC RBC-ENTMCNC: 30 PG
MCHC RBC-ENTMCNC: 34.7 GM/DL
MCV RBC AUTO: 86.4 FL
MICROSCOPIC-UA: NORMAL
MONOCYTES # BLD AUTO: 0.7 K/UL
MONOCYTES NFR BLD AUTO: 12.6 %
NEUTROPHILS # BLD AUTO: 2.95 K/UL
NEUTROPHILS NFR BLD AUTO: 53 %
NITRITE URINE: NEGATIVE
NONHDLC SERPL-MCNC: 143 MG/DL
PH URINE: 5.5
PLATELET # BLD AUTO: 222 K/UL
POTASSIUM SERPL-SCNC: 4.5 MMOL/L
PROT SERPL-MCNC: 7.3 G/DL
PROTEIN URINE: NEGATIVE
PSA FREE SERPL-MCNC: 0.98 NG/ML
PSA SERPL-MCNC: 4.49 NG/ML
RBC # BLD: 4.84 M/UL
RBC # FLD: 13 %
RED BLOOD CELLS URINE: 1 /HPF
SODIUM SERPL-SCNC: 138 MMOL/L
SPECIFIC GRAVITY URINE: 1.02
SQUAMOUS EPITHELIAL CELLS: 0 /HPF
TRIGL SERPL-MCNC: 137 MG/DL
TSH SERPL-ACNC: 3.52 UIU/ML
UROBILINOGEN URINE: NORMAL
WBC # FLD AUTO: 5.56 K/UL
WHITE BLOOD CELLS URINE: 0 /HPF

## 2023-01-09 ENCOUNTER — APPOINTMENT (OUTPATIENT)
Dept: INTERNAL MEDICINE | Facility: CLINIC | Age: 73
End: 2023-01-09
Payer: MEDICARE

## 2023-01-09 VITALS
TEMPERATURE: 97.2 F | DIASTOLIC BLOOD PRESSURE: 94 MMHG | RESPIRATION RATE: 17 BRPM | OXYGEN SATURATION: 98 % | HEART RATE: 107 BPM | SYSTOLIC BLOOD PRESSURE: 162 MMHG

## 2023-01-09 DIAGNOSIS — R55 SYNCOPE AND COLLAPSE: ICD-10-CM

## 2023-01-09 PROCEDURE — 99214 OFFICE O/P EST MOD 30 MIN: CPT

## 2023-01-09 NOTE — HISTORY OF PRESENT ILLNESS
[FreeTextEntry1] : BPH and elevated PSA [de-identified] : BPH urinary symptoms improved with tamsulosin\par \par Elevated PSA remains about the same\par \par Had episode of syncope right after taking \par had prior admission and workiup 4 years ago

## 2023-01-17 ENCOUNTER — APPOINTMENT (OUTPATIENT)
Dept: UROLOGY | Facility: CLINIC | Age: 73
End: 2023-01-17
Payer: MEDICARE

## 2023-01-17 VITALS
OXYGEN SATURATION: 98 % | HEART RATE: 102 BPM | SYSTOLIC BLOOD PRESSURE: 170 MMHG | HEIGHT: 70 IN | WEIGHT: 205 LBS | BODY MASS INDEX: 29.35 KG/M2 | DIASTOLIC BLOOD PRESSURE: 90 MMHG

## 2023-01-17 PROCEDURE — 99204 OFFICE O/P NEW MOD 45 MIN: CPT

## 2023-01-17 NOTE — HISTORY OF PRESENT ILLNESS
[FreeTextEntry1] : Mr. LEONG is a 72 year  White  M who comes today to clinic for elevated PSA to 4.5 referred by Dr. Reyes.\par Has no family history of Prostate Cancer.  \par Moderate LUTS improving on tamsulosin for 3 weeks.\par \par

## 2023-01-17 NOTE — ASSESSMENT
[FreeTextEntry1] : Mr. LEONG is a 72 year  White  M who comes today to clinic for elevated PSA referred by Dr. Reyes.\par \par The patient understands that PSA is a marker of cancer risk but does not diagnose cancer. He also understands that there are a number of benign conditions including UTI, BPH, certain activities and prostatitis that will increase PSA in the absence of cancer. Unfortunately the only way to definitively diagnose cancer is with a prostate biopsy. We discussed the method of performing biopsy (transrectally with local anesthesia) and the risks (bleeding, infection, urinary retention, ED). In addition to this, the patient and I discussed the discrepancy between the prevalence of prostate cancer and the prevalence of death from prostate cancer.  Prostate cancer is the most common solid tumor in American men. However, we discussed that it can be very slow growing, many men with prostate cancer will die with the disease rather than from it.\par \par I gave the patient the following options: \par 1. Do nothing. This could miss an undiagnosed prostate cancer which may have little effect or significant effects at a later date. \par 2. Recheck his PSA in 3-6 months. We would then regroup and have this discussion about his options again. \par 3. Perform a prostate biopsy: I explained how a prostate biopsy is performed and the risks including bleeding and infection and difficulty voiding. \par 4. Perform a Prostate mpMRI to evaluate the prostate for any suspicious lesions. The results will determine if he needs to have a targeted prostate biopsy.\par \par Based on our discussion the patient decided to proceed with mpMRI of prostate.\par \par Today we discussed that BPH is a condition of prostatic enlargement that blocks the bladder outlet and can make it difficult to void. Over time this can manifest itself as urinary frequency, urgency, straining to void, poor stream, nocturia, high PVR's and retention. While BPH is related to prostate volume (increasing prostate volume results in increased likelihood of complications from BPH), there are many men with "normal volume" prostates that have symptoms of occlusion.\par \par We discussed the effects that BPH can cause on the bladder: BPH can cause detrusor overactivity which results in urgency and frequency and in some cases, incontinence. Over a longer time,some men may develop large volume/acontractile bladders, while other men develop small or normal volume bladders with loss of compliance. Neither is ideal and both represent end stage bladder damage that can not be fixed and can cause kidney injury. I explained the 3 main jobs of the bladder which are to 1) store urine, 2) evacuate urine and 3) keep urine at low pressure to prevent upper tract injury. I explained the mechanisms of urinary tract infections, hydronephrosis and kidney injury due to urinary retention.  These consequences can be severe, irreversible and even life threatening.\par \par We then discussed the treatment options for BPH. We generally start with medical management and proceed to surgery if necessary. Alpha blockers act to relax the prostate while 5-alpha reductase inhibitors (5-DAYNE’s) shrink the prostate. 5-DAYNE's are most effective in patients who have prostates that are >40gm. The former are fairly fast acting (in a matter of days-weeks) while the latter can take up to 3-6 months to take effect.\par \par Anticholinergics and Myrbetriq merely treat the bladder symptoms that are caused by the sequelae of outlet obstruction. They "relax" the bladder and they can weaken the urine stream and make retention worse or precipitate retention. They do not address the underlying cause of overactivity in this setting but do ease bladder spasms and can help with urgency and frequency. They are useful in certain carefully selected patients.\par \par If medical management fails, the patient can not wait for medical management to work, or the patient has YINKA caused by retention, he may consider surgery. There are different surgical options including transurethral resection of the prostate, Greenlight laser prostatectomy, Aquablation, Minimally invasive surgical therapies like Urolift and Rezum, and simple prostatectomy. We discussed the risks including bleeding, infection, damage to the urethra, bladder and ureteral orifices, scar tissue, leaking (either due to injuring the sphincter or lowering bladder outlet obstruction with concomitant detrusor overactivity) and retrograde ejaculation. The patient understands that some of these complications are reversible while some are not and may require additional procedures.\par \par Based on our discussion the patient wishes to proceed with this plan.\par \par

## 2023-01-18 ENCOUNTER — NON-APPOINTMENT (OUTPATIENT)
Age: 73
End: 2023-01-18

## 2023-01-18 ENCOUNTER — APPOINTMENT (OUTPATIENT)
Dept: CARDIOLOGY | Facility: CLINIC | Age: 73
End: 2023-01-18
Payer: MEDICARE

## 2023-01-18 VITALS
BODY MASS INDEX: 29.35 KG/M2 | RESPIRATION RATE: 18 BRPM | WEIGHT: 205 LBS | OXYGEN SATURATION: 93 % | TEMPERATURE: 96.4 F | DIASTOLIC BLOOD PRESSURE: 72 MMHG | HEIGHT: 70 IN | HEART RATE: 93 BPM | SYSTOLIC BLOOD PRESSURE: 151 MMHG

## 2023-01-18 DIAGNOSIS — R06.09 OTHER FORMS OF DYSPNEA: ICD-10-CM

## 2023-01-18 PROCEDURE — 93000 ELECTROCARDIOGRAM COMPLETE: CPT

## 2023-01-18 PROCEDURE — 99204 OFFICE O/P NEW MOD 45 MIN: CPT

## 2023-01-20 LAB
APPEARANCE: CLEAR
BACTERIA UR CULT: NORMAL
BACTERIA: NEGATIVE
BILIRUBIN URINE: NEGATIVE
BLOOD URINE: NEGATIVE
COLOR: NORMAL
GLUCOSE QUALITATIVE U: NEGATIVE
HYALINE CASTS: 1 /LPF
KETONES URINE: NEGATIVE
LEUKOCYTE ESTERASE URINE: NEGATIVE
MICROSCOPIC-UA: NORMAL
NITRITE URINE: NEGATIVE
PH URINE: 6
PROTEIN URINE: NEGATIVE
RED BLOOD CELLS URINE: 2 /HPF
SPECIFIC GRAVITY URINE: 1.02
SQUAMOUS EPITHELIAL CELLS: 0 /HPF
UROBILINOGEN URINE: NORMAL
WHITE BLOOD CELLS URINE: 0 /HPF

## 2023-01-23 PROBLEM — R06.09 DYSPNEA ON EXERTION: Status: ACTIVE | Noted: 2023-01-18

## 2023-01-23 NOTE — CARDIOLOGY SUMMARY
[de-identified] : 1/18/23 sinus rhythm rightward axis [de-identified] : 1/25/18 stage 3, 8 minutes [de-identified] : 11/17/18 moderately enlarge right ventricle, mild Pulmonic valve regurgitation ejection fraction 55 to 60%

## 2023-01-23 NOTE — REASON FOR VISIT
[CV Risk Factors and Non-Cardiac Disease] : CV risk factors and non-cardiac disease [Coronary Artery Disease] : coronary artery disease [FreeTextEntry3] : Dr. Reyes [FreeTextEntry1] : Jt will be undergoing an MRI of the prostate . He has had some shortness of breath and hypertension. He  is followed by Dr. Henry who has treated him with Pletal cilostazol for peripheral vascular disease. An MRI is planned next week. He worked as a [employer] in [employer] for 45 years. He  comes today for clarification of  his  overall cardiovascular risk. He was advised to undergo a complete cardiac evaluation. He denies current chest pains shortness of breath or loss of consciousnes.\par \par

## 2023-01-23 NOTE — DISCUSSION/SUMMARY
[FreeTextEntry1] : I have asked  Jt  to undergo detailed cardiac testing in order to evaluate her overall cardiovascular risk. An assessment of both structural and functional heart disease was recommended to the patient.\par In this regard, an echocardiogram and a stress test were advised to the patient. I await the upcoming noninvasive cardiac testing in order to assess her overall cardiovascular risk. We discussed the pros and cons of plain treadmill stress testing nuclear stress testing and angiography including a sensitivity analysis.We discussed current ACC guidelines and the calculated 10 year risk is approximately   48% which is severely elevated. More than half of the face to face encounter of 60 minutes   was spent in counseling the patient with respect to  cardiovascular risk. we suspect possible chronic pulmonary disorder given prior right ventricular enlargement on 2018\par Quality measures \par Tobacco intervention not indicated\par Statin for prevention of cardiovascular disease indicated\par Hypertension compensated\par Aspirin for ischemic vascular disease not indicated\par Tobacco screening cessation and intervention not indicated\par \par Medical necessity\par This is a high encounter based upon two or more chronic illnesses with mild exacerbation requiring further management and evaluation.   .\par \par EKG is indicated for evaluation of shortness of breath\par \par this patient has a high risk for major adverese cardiac events. based upon [place] risk\par risks benefits alternatives were discussed with the patient.\par all questions were answered to his satisfaction.\par

## 2023-01-24 ENCOUNTER — OUTPATIENT (OUTPATIENT)
Dept: OUTPATIENT SERVICES | Facility: HOSPITAL | Age: 73
LOS: 1 days | End: 2023-01-24
Payer: MEDICARE

## 2023-01-24 ENCOUNTER — APPOINTMENT (OUTPATIENT)
Dept: MRI IMAGING | Facility: CLINIC | Age: 73
End: 2023-01-24
Payer: MEDICARE

## 2023-01-24 DIAGNOSIS — Z96.641 PRESENCE OF RIGHT ARTIFICIAL HIP JOINT: Chronic | ICD-10-CM

## 2023-01-24 DIAGNOSIS — R97.20 ELEVATED PROSTATE SPECIFIC ANTIGEN [PSA]: ICD-10-CM

## 2023-01-24 DIAGNOSIS — Z96.642 PRESENCE OF LEFT ARTIFICIAL HIP JOINT: Chronic | ICD-10-CM

## 2023-01-24 DIAGNOSIS — Z90.49 ACQUIRED ABSENCE OF OTHER SPECIFIED PARTS OF DIGESTIVE TRACT: Chronic | ICD-10-CM

## 2023-01-24 DIAGNOSIS — H33.053 TOTAL RETINAL DETACHMENT, BILATERAL: Chronic | ICD-10-CM

## 2023-01-24 LAB
PSA FREE FLD-MCNC: 23 %
PSA FREE SERPL-MCNC: 1.03 NG/ML
PSA SERPL-MCNC: 4.47 NG/ML

## 2023-01-24 PROCEDURE — A9585: CPT

## 2023-01-24 PROCEDURE — 72197 MRI PELVIS W/O & W/DYE: CPT | Mod: 26,MH

## 2023-01-24 PROCEDURE — 72197 MRI PELVIS W/O & W/DYE: CPT

## 2023-01-24 PROCEDURE — 76498 UNLISTED MR PROCEDURE: CPT

## 2023-01-24 PROCEDURE — 76498P: CUSTOM | Mod: 26,MH

## 2023-01-25 ENCOUNTER — APPOINTMENT (OUTPATIENT)
Dept: CARDIOLOGY | Facility: CLINIC | Age: 73
End: 2023-01-25
Payer: MEDICARE

## 2023-01-25 PROCEDURE — 93306 TTE W/DOPPLER COMPLETE: CPT

## 2023-01-27 ENCOUNTER — APPOINTMENT (OUTPATIENT)
Dept: CARDIOLOGY | Facility: CLINIC | Age: 73
End: 2023-01-27
Payer: MEDICARE

## 2023-01-27 PROCEDURE — 93015 CV STRESS TEST SUPVJ I&R: CPT

## 2023-01-30 DIAGNOSIS — R93.1 ABNORMAL FINDINGS ON DIAGNOSTIC IMAGING OF HEART AND CORONARY CIRCULATION: ICD-10-CM

## 2023-02-07 ENCOUNTER — APPOINTMENT (OUTPATIENT)
Dept: UROLOGY | Facility: CLINIC | Age: 73
End: 2023-02-07
Payer: MEDICARE

## 2023-02-07 VITALS
HEART RATE: 88 BPM | SYSTOLIC BLOOD PRESSURE: 116 MMHG | WEIGHT: 205 LBS | HEIGHT: 70 IN | DIASTOLIC BLOOD PRESSURE: 62 MMHG | BODY MASS INDEX: 29.35 KG/M2 | TEMPERATURE: 97.1 F | OXYGEN SATURATION: 95 %

## 2023-02-07 PROCEDURE — 99213 OFFICE O/P EST LOW 20 MIN: CPT

## 2023-02-07 NOTE — HISTORY OF PRESENT ILLNESS
[FreeTextEntry1] : Mr. LEONG is a 72 year  White  M who comes today to clinic for elevated PSA to 4.5 referred by Dr. Reyes.\par Has no family history of Prostate Cancer.  \par Moderate LUTS improving on tamsulosin for 3 weeks.\par \par \par 2/7/23\par \par mpMRI:\par Multiple linear/wedge-shaped areas of T2 hypointense signal, likely inflammatory.\par PIRADS 2 - Low (clinically significant cancer is unlikely to be present)\par Prostate Volume: 75 mL\par PSA density: 0.06 ng/mL/mL\par \par LUTS remarkably improved with tamsulosin

## 2023-02-07 NOTE — ASSESSMENT
[FreeTextEntry1] : Mr. LEONG is a 72 year  White  M who comes today to clinic for elevated PSA to 4.6 referred by Dr. Reyes. mpMRI with diffuse inflammation PIRADS 2. Improved LUTS, will continue flomax.\par \par The patient understands that PSA is a marker of cancer risk but does not diagnose cancer. He also understands that there are a number of benign conditions including UTI, BPH, certain activities and prostatitis that will increase PSA in the absence of cancer. Unfortunately the only way to definitively diagnose cancer is with a prostate biopsy. We discussed the method of performing biopsy (transrectally with local anesthesia) and the risks (bleeding, infection, urinary retention, ED). In addition to this, the patient and I discussed the discrepancy between the prevalence of prostate cancer and the prevalence of death from prostate cancer.  Prostate cancer is the most common solid tumor in American men. However, we discussed that it can be very slow growing, many men with prostate cancer will die with the disease rather than from it.\par \par I gave the patient the following options: \par 1. Do nothing. This could miss an undiagnosed prostate cancer which may have little effect or significant effects at a later date. \par 2. Recheck his PSA in 3-6 months. We would then regroup and have this discussion about his options again. \par 3. Perform a prostate biopsy: I explained how a prostate biopsy is performed and the risks including bleeding and infection and difficulty voiding. \par \par Based on our discussion the patient decided to proceed with PSA in 10 weeks (3 months after last PSA). \par \par \par

## 2023-02-08 ENCOUNTER — TRANSCRIPTION ENCOUNTER (OUTPATIENT)
Age: 73
End: 2023-02-08

## 2023-02-22 ENCOUNTER — RESULT REVIEW (OUTPATIENT)
Age: 73
End: 2023-02-22

## 2023-02-22 ENCOUNTER — OUTPATIENT (OUTPATIENT)
Dept: OUTPATIENT SERVICES | Facility: HOSPITAL | Age: 73
LOS: 1 days | End: 2023-02-22
Payer: MEDICARE

## 2023-02-22 ENCOUNTER — APPOINTMENT (OUTPATIENT)
Dept: CT IMAGING | Facility: CLINIC | Age: 73
End: 2023-02-22
Payer: MEDICARE

## 2023-02-22 DIAGNOSIS — Z96.642 PRESENCE OF LEFT ARTIFICIAL HIP JOINT: Chronic | ICD-10-CM

## 2023-02-22 DIAGNOSIS — Z90.49 ACQUIRED ABSENCE OF OTHER SPECIFIED PARTS OF DIGESTIVE TRACT: Chronic | ICD-10-CM

## 2023-02-22 DIAGNOSIS — R93.1 ABNORMAL FINDINGS ON DIAGNOSTIC IMAGING OF HEART AND CORONARY CIRCULATION: ICD-10-CM

## 2023-02-22 DIAGNOSIS — H33.053 TOTAL RETINAL DETACHMENT, BILATERAL: Chronic | ICD-10-CM

## 2023-02-22 DIAGNOSIS — Z96.641 PRESENCE OF RIGHT ARTIFICIAL HIP JOINT: Chronic | ICD-10-CM

## 2023-02-22 DIAGNOSIS — R07.89 OTHER CHEST PAIN: ICD-10-CM

## 2023-02-22 PROCEDURE — 0503T: CPT

## 2023-02-22 PROCEDURE — 0502T: CPT

## 2023-02-22 PROCEDURE — 75574 CT ANGIO HRT W/3D IMAGE: CPT | Mod: 26,MH

## 2023-02-22 PROCEDURE — 0504T: CPT

## 2023-02-22 PROCEDURE — 75574 CT ANGIO HRT W/3D IMAGE: CPT

## 2023-02-27 ENCOUNTER — NON-APPOINTMENT (OUTPATIENT)
Age: 73
End: 2023-02-27

## 2023-02-27 DIAGNOSIS — Z01.812 ENCOUNTER FOR PREPROCEDURAL LABORATORY EXAMINATION: ICD-10-CM

## 2023-02-27 DIAGNOSIS — Z20.822 ENCOUNTER FOR PREPROCEDURAL LABORATORY EXAMINATION: ICD-10-CM

## 2023-02-28 RX ORDER — DILTIAZEM HYDROCHLORIDE 120 MG/1
120 CAPSULE, EXTENDED RELEASE ORAL
Qty: 30 | Refills: 2 | Status: DISCONTINUED | COMMUNITY
Start: 2023-02-22 | End: 2023-02-28

## 2023-03-01 LAB — SARS-COV-2 N GENE NPH QL NAA+PROBE: NOT DETECTED

## 2023-03-02 ENCOUNTER — APPOINTMENT (OUTPATIENT)
Dept: CARDIOLOGY | Facility: CLINIC | Age: 73
End: 2023-03-02

## 2023-03-02 ENCOUNTER — INPATIENT (INPATIENT)
Facility: HOSPITAL | Age: 73
LOS: 11 days | Discharge: HOME HEALTH | DRG: 234 | End: 2023-03-14
Attending: STUDENT IN AN ORGANIZED HEALTH CARE EDUCATION/TRAINING PROGRAM | Admitting: STUDENT IN AN ORGANIZED HEALTH CARE EDUCATION/TRAINING PROGRAM
Payer: MEDICARE

## 2023-03-02 VITALS
SYSTOLIC BLOOD PRESSURE: 129 MMHG | HEART RATE: 86 BPM | HEIGHT: 70.5 IN | TEMPERATURE: 98 F | WEIGHT: 205.03 LBS | DIASTOLIC BLOOD PRESSURE: 64 MMHG | RESPIRATION RATE: 16 BRPM | OXYGEN SATURATION: 96 %

## 2023-03-02 DIAGNOSIS — I25.10 ATHEROSCLEROTIC HEART DISEASE OF NATIVE CORONARY ARTERY WITHOUT ANGINA PECTORIS: ICD-10-CM

## 2023-03-02 DIAGNOSIS — H33.053 TOTAL RETINAL DETACHMENT, BILATERAL: Chronic | ICD-10-CM

## 2023-03-02 DIAGNOSIS — Z96.641 PRESENCE OF RIGHT ARTIFICIAL HIP JOINT: Chronic | ICD-10-CM

## 2023-03-02 DIAGNOSIS — I10 ESSENTIAL (PRIMARY) HYPERTENSION: ICD-10-CM

## 2023-03-02 DIAGNOSIS — Z96.642 PRESENCE OF LEFT ARTIFICIAL HIP JOINT: Chronic | ICD-10-CM

## 2023-03-02 DIAGNOSIS — Z90.49 ACQUIRED ABSENCE OF OTHER SPECIFIED PARTS OF DIGESTIVE TRACT: Chronic | ICD-10-CM

## 2023-03-02 DIAGNOSIS — E11.9 TYPE 2 DIABETES MELLITUS WITHOUT COMPLICATIONS: ICD-10-CM

## 2023-03-02 LAB
ALBUMIN SERPL ELPH-MCNC: 4.7 G/DL — SIGNIFICANT CHANGE UP (ref 3.3–5)
ALP SERPL-CCNC: 100 U/L — SIGNIFICANT CHANGE UP (ref 40–120)
ALT FLD-CCNC: 32 U/L — SIGNIFICANT CHANGE UP (ref 10–45)
ANION GAP SERPL CALC-SCNC: 12 MMOL/L — SIGNIFICANT CHANGE UP (ref 5–17)
APTT BLD: 27.8 SEC — SIGNIFICANT CHANGE UP (ref 27.5–35.5)
AST SERPL-CCNC: 25 U/L — SIGNIFICANT CHANGE UP (ref 10–40)
BILIRUB DIRECT SERPL-MCNC: 0.1 MG/DL — SIGNIFICANT CHANGE UP (ref 0–0.3)
BILIRUB INDIRECT FLD-MCNC: 0.2 MG/DL — SIGNIFICANT CHANGE UP (ref 0.2–1)
BILIRUB SERPL-MCNC: 0.3 MG/DL — SIGNIFICANT CHANGE UP (ref 0.2–1.2)
BUN SERPL-MCNC: 20 MG/DL — SIGNIFICANT CHANGE UP (ref 7–23)
CALCIUM SERPL-MCNC: 9.3 MG/DL — SIGNIFICANT CHANGE UP (ref 8.4–10.5)
CHLORIDE SERPL-SCNC: 103 MMOL/L — SIGNIFICANT CHANGE UP (ref 96–108)
CO2 SERPL-SCNC: 25 MMOL/L — SIGNIFICANT CHANGE UP (ref 22–31)
CREAT SERPL-MCNC: 1.15 MG/DL — SIGNIFICANT CHANGE UP (ref 0.5–1.3)
EGFR: 67 ML/MIN/1.73M2 — SIGNIFICANT CHANGE UP
GLUCOSE BLDC GLUCOMTR-MCNC: 105 MG/DL — HIGH (ref 70–99)
GLUCOSE BLDC GLUCOMTR-MCNC: 113 MG/DL — HIGH (ref 70–99)
GLUCOSE SERPL-MCNC: 124 MG/DL — HIGH (ref 70–99)
HCT VFR BLD CALC: 42.4 % — SIGNIFICANT CHANGE UP (ref 39–50)
HGB BLD-MCNC: 14.3 G/DL — SIGNIFICANT CHANGE UP (ref 13–17)
INR BLD: 1.09 RATIO — SIGNIFICANT CHANGE UP (ref 0.88–1.16)
MCHC RBC-ENTMCNC: 30 PG — SIGNIFICANT CHANGE UP (ref 27–34)
MCHC RBC-ENTMCNC: 33.7 GM/DL — SIGNIFICANT CHANGE UP (ref 32–36)
MCV RBC AUTO: 88.9 FL — SIGNIFICANT CHANGE UP (ref 80–100)
NRBC # BLD: 0 /100 WBCS — SIGNIFICANT CHANGE UP (ref 0–0)
PLATELET # BLD AUTO: 213 K/UL — SIGNIFICANT CHANGE UP (ref 150–400)
POTASSIUM SERPL-MCNC: 4.4 MMOL/L — SIGNIFICANT CHANGE UP (ref 3.5–5.3)
POTASSIUM SERPL-SCNC: 4.4 MMOL/L — SIGNIFICANT CHANGE UP (ref 3.5–5.3)
PROT SERPL-MCNC: 7.6 G/DL — SIGNIFICANT CHANGE UP (ref 6–8.3)
PROTHROM AB SERPL-ACNC: 12.5 SEC — SIGNIFICANT CHANGE UP (ref 10.5–13.4)
RBC # BLD: 4.77 M/UL — SIGNIFICANT CHANGE UP (ref 4.2–5.8)
RBC # FLD: 12.3 % — SIGNIFICANT CHANGE UP (ref 10.3–14.5)
SODIUM SERPL-SCNC: 140 MMOL/L — SIGNIFICANT CHANGE UP (ref 135–145)
T3 SERPL-MCNC: 96 NG/DL — SIGNIFICANT CHANGE UP (ref 80–200)
T4 AB SER-ACNC: 6.1 UG/DL — SIGNIFICANT CHANGE UP (ref 4.6–12)
TSH SERPL-MCNC: 5.5 UIU/ML — HIGH (ref 0.27–4.2)
WBC # BLD: 4.87 K/UL — SIGNIFICANT CHANGE UP (ref 3.8–10.5)
WBC # FLD AUTO: 4.87 K/UL — SIGNIFICANT CHANGE UP (ref 3.8–10.5)

## 2023-03-02 PROCEDURE — 93880 EXTRACRANIAL BILAT STUDY: CPT | Mod: 26

## 2023-03-02 PROCEDURE — 99222 1ST HOSP IP/OBS MODERATE 55: CPT

## 2023-03-02 PROCEDURE — 93010 ELECTROCARDIOGRAM REPORT: CPT

## 2023-03-02 PROCEDURE — 71045 X-RAY EXAM CHEST 1 VIEW: CPT | Mod: 26

## 2023-03-02 PROCEDURE — 99152 MOD SED SAME PHYS/QHP 5/>YRS: CPT

## 2023-03-02 PROCEDURE — 93458 L HRT ARTERY/VENTRICLE ANGIO: CPT | Mod: 26

## 2023-03-02 RX ORDER — MONTELUKAST 4 MG/1
10 TABLET, CHEWABLE ORAL DAILY
Refills: 0 | Status: DISCONTINUED | OUTPATIENT
Start: 2023-03-02 | End: 2023-03-07

## 2023-03-02 RX ORDER — DORZOLAMIDE HYDROCHLORIDE 20 MG/ML
1 SOLUTION/ DROPS OPHTHALMIC
Qty: 0 | Refills: 0 | DISCHARGE

## 2023-03-02 RX ORDER — ENOXAPARIN SODIUM 100 MG/ML
40 INJECTION SUBCUTANEOUS EVERY 24 HOURS
Refills: 0 | Status: DISCONTINUED | OUTPATIENT
Start: 2023-03-02 | End: 2023-03-06

## 2023-03-02 RX ORDER — RAMIPRIL 5 MG
1 CAPSULE ORAL
Qty: 0 | Refills: 0 | DISCHARGE

## 2023-03-02 RX ORDER — DEXTROSE 50 % IN WATER 50 %
12.5 SYRINGE (ML) INTRAVENOUS ONCE
Refills: 0 | Status: DISCONTINUED | OUTPATIENT
Start: 2023-03-02 | End: 2023-03-07

## 2023-03-02 RX ORDER — INSULIN LISPRO 100/ML
VIAL (ML) SUBCUTANEOUS
Refills: 0 | Status: DISCONTINUED | OUTPATIENT
Start: 2023-03-02 | End: 2023-03-07

## 2023-03-02 RX ORDER — METOPROLOL TARTRATE 50 MG
12.5 TABLET ORAL EVERY 12 HOURS
Refills: 0 | Status: DISCONTINUED | OUTPATIENT
Start: 2023-03-02 | End: 2023-03-03

## 2023-03-02 RX ORDER — SODIUM CHLORIDE 9 MG/ML
1000 INJECTION, SOLUTION INTRAVENOUS
Refills: 0 | Status: DISCONTINUED | OUTPATIENT
Start: 2023-03-02 | End: 2023-03-07

## 2023-03-02 RX ORDER — DEXTROSE 50 % IN WATER 50 %
25 SYRINGE (ML) INTRAVENOUS ONCE
Refills: 0 | Status: DISCONTINUED | OUTPATIENT
Start: 2023-03-02 | End: 2023-03-07

## 2023-03-02 RX ORDER — INSULIN LISPRO 100/ML
VIAL (ML) SUBCUTANEOUS AT BEDTIME
Refills: 0 | Status: DISCONTINUED | OUTPATIENT
Start: 2023-03-02 | End: 2023-03-07

## 2023-03-02 RX ORDER — SODIUM CHLORIDE 9 MG/ML
1000 INJECTION INTRAMUSCULAR; INTRAVENOUS; SUBCUTANEOUS
Refills: 0 | Status: DISCONTINUED | OUTPATIENT
Start: 2023-03-02 | End: 2023-03-07

## 2023-03-02 RX ORDER — SODIUM CHLORIDE 9 MG/ML
3 INJECTION INTRAMUSCULAR; INTRAVENOUS; SUBCUTANEOUS EVERY 8 HOURS
Refills: 0 | Status: DISCONTINUED | OUTPATIENT
Start: 2023-03-02 | End: 2023-03-07

## 2023-03-02 RX ORDER — DORZOLAMIDE HYDROCHLORIDE 20 MG/ML
1 SOLUTION/ DROPS OPHTHALMIC
Refills: 0 | Status: DISCONTINUED | OUTPATIENT
Start: 2023-03-02 | End: 2023-03-07

## 2023-03-02 RX ORDER — GLUCAGON INJECTION, SOLUTION 0.5 MG/.1ML
1 INJECTION, SOLUTION SUBCUTANEOUS ONCE
Refills: 0 | Status: DISCONTINUED | OUTPATIENT
Start: 2023-03-02 | End: 2023-03-07

## 2023-03-02 RX ORDER — DEXTROSE 50 % IN WATER 50 %
15 SYRINGE (ML) INTRAVENOUS ONCE
Refills: 0 | Status: DISCONTINUED | OUTPATIENT
Start: 2023-03-02 | End: 2023-03-07

## 2023-03-02 RX ORDER — TAMSULOSIN HYDROCHLORIDE 0.4 MG/1
0.4 CAPSULE ORAL AT BEDTIME
Refills: 0 | Status: DISCONTINUED | OUTPATIENT
Start: 2023-03-02 | End: 2023-03-07

## 2023-03-02 RX ORDER — ATORVASTATIN CALCIUM 80 MG/1
10 TABLET, FILM COATED ORAL AT BEDTIME
Refills: 0 | Status: DISCONTINUED | OUTPATIENT
Start: 2023-03-02 | End: 2023-03-07

## 2023-03-02 RX ORDER — ASPIRIN/CALCIUM CARB/MAGNESIUM 324 MG
325 TABLET ORAL DAILY
Refills: 0 | Status: DISCONTINUED | OUTPATIENT
Start: 2023-03-03 | End: 2023-03-07

## 2023-03-02 RX ADMIN — SODIUM CHLORIDE 75 MILLILITER(S): 9 INJECTION INTRAMUSCULAR; INTRAVENOUS; SUBCUTANEOUS at 10:30

## 2023-03-02 RX ADMIN — SODIUM CHLORIDE 250 MILLILITER(S): 9 INJECTION INTRAMUSCULAR; INTRAVENOUS; SUBCUTANEOUS at 10:00

## 2023-03-02 RX ADMIN — ATORVASTATIN CALCIUM 10 MILLIGRAM(S): 80 TABLET, FILM COATED ORAL at 22:08

## 2023-03-02 RX ADMIN — SODIUM CHLORIDE 3 MILLILITER(S): 9 INJECTION INTRAMUSCULAR; INTRAVENOUS; SUBCUTANEOUS at 22:17

## 2023-03-02 RX ADMIN — ENOXAPARIN SODIUM 40 MILLIGRAM(S): 100 INJECTION SUBCUTANEOUS at 22:08

## 2023-03-02 NOTE — CONSULT NOTE ADULT - NSCONSULTADDITIONALINFOA_GEN_ALL_CORE
Awilda Neumann, AGACNP-BC  Cardiovascular & Thoracic Surgery  [address] [place] [address]  Office: [phone]    Available on [employer] Teams  Spectra: [id number]  New Consults: [id number]

## 2023-03-02 NOTE — PATIENT PROFILE ADULT - HEALTH LITERACY
8/3/2022         RE: Hugo Longoria  [address] [address] [id number]        Dear Colleague,    Thank you for referring your patient, Hugo Longoria, to the [facility]. Please see a copy of my visit note below.    [facility] Dermatology Note  Encounter Date: Aug 3, 2022  Office Visit     Dermatology Problem List:  Last skin check 1/26/22  1. History of NMSC  - BCC, L lateral forehead, s/p Mohs with intermediate repair 2/2/22     Social history: Works for a [employer], works as a kowalski. Planning to retire in the coming 1-2 years. Has a home in [place].  Family history: Mother, father and cousins with unknown skin cancer.    ____________________________________________    Assessment & Plan:    1. History of nonmelanoma skin cancer, no clincial evidence of recurrence.   - ABCDEs: Counseled ABCDEs of melanoma: Asymmetry, Border (irregularity), Color (not uniform, changes in color), Diameter (greater than 6 mm which is about the size of a pencil eraser), and Evolving (any changes in preexisting moles).  - Sun protection: Counseled SPF30+ sunscreen, UPF clothing, sun avoidance, tanning bed avoidance.     2. Folliculitis - chest and abdomen. Recommended a benzoyl peroxide wash daily. No interventions started at this time.    3. Multiple clinically banal-appearing melanocytic nevi: Chronic, stable  - No further intervention required. Patient to report changes.   - Patient reassured of the benign nature of these lesions.    4. Benign findings including: seborrheic keratoses, epidermoid cyst, and cherry angioma: minor, self limited problems.  - No further intervention required. Patient to report changes.   - Patient reassured of the benign nature of these lesions.      Procedures Performed:   None.    Follow-up: 6 month(s) in-person, or earlier for new or changing lesions    Staff and Scribe:     Scribe Disclosure:   Brett HERNANDEZ, am serving as a  scribe to document services personally performed by this physician, Dr. Lonnie Rascon, based on data collection and the provider's statements to me.       Provider Disclosure:   The documentation recorded by the scribe accurately reflects the services I personally performed and the decisions made by me.    Lonnie Rascon MD   of Dermatology  Department of Dermatology  [facility]      ____________________________________________    CC: Skin Check (Full body skin check. Patient states concerns for itchy back. Personal history of NMSC.)    HPI:  Mr. Hugo Longoria is a(n) 58 year old male who presents today as a return patient for a skin check.    Last seen 2/16/22 for a wound check with Dr. Vides.    Today, he has concerns about itchiness on his back. He says that he develops little bumps, and there was one in particular that he has had for years. His wife occasionally pops it.     Patient is otherwise feeling well, without additional skin concerns.    Labs Reviewed:  N/A    Physical Exam:  Vitals: There were no vitals taken for this visit.  SKIN: Total skin excluding the undergarment areas was performed. The exam included the head/face, neck, both arms, chest, back, abdomen, both legs, digits and/or nails.   - Well healed scar at site of previous NMSC.  - Multiple erythematous folliculocentric pustule on the abdomen and thighs.  - There are dome shaped bright red papules on the trunk and extremities.   - There is a raised dome shaped 1 cm nodule with a central punctum located on lower back.  - Multiple regular brown pigmented macules and papules are identified on the trunk and extremities.   - Scattered brown macules on sun exposed areas.  - There are waxy stuck on tan to brown papules on the trunk and extremities.   - No other lesions of concern on areas examined.     Medications:  Current Outpatient Medications   Medication     atenolol (TENORMIN) 25 MG  tablet     atorvastatin (LIPITOR) 20 MG tablet     Bioflavonoid Products (VITAMIN C) CHEW     diclofenac (VOLTAREN) 1 % topical gel     fluticasone (FLONASE) 50 MCG/ACT nasal spray     lisinopril (ZESTRIL) 20 MG tablet     Multiple Vitamin (MULTIVITAMINS PO)     omeprazole (PRILOSEC) 40 MG DR capsule     Current Facility-Administered Medications   Medication     botulinum toxin type A (BOTOX) 100 units injection 100 Units      Past Medical History:   Patient Active Problem List   Diagnosis     History of colonic polyps     KERRY (generalized anxiety disorder)     CARDIOVASCULAR SCREENING; LDL GOAL LESS THAN 130     History of tobacco use: 1980 - 1990     Hemorrhoids     Erectile dysfunction     HTN, goal below 140/90     Fatigue     ACL tear     History of gastric ulcer     Chronic gastric ulcer     Seasonal allergic rhinitis     Complete tear of right rotator cuff     Rupture long head biceps tendon, right, initial encounter     Subacromial impingement of right shoulder     Advanced directives, counseling/discussion     Primary osteoarthritis of right knee     Chronic right shoulder pain     S/P total knee replacement using cement, right     Morbid obesity (H)     Chronic rhinitis     Past Medical History:   Diagnosis Date     Abnormalities of size and form of teeth     Removal of wisdom teeth     Accidental poisoning by agents primarily affecting blood constituents(E858.2)     Overnight stay due to blood poisoning     Arthritis 2011     Cancer (H) 2/15/2022    Skin Cancer on Face     Gastric ulcer, unspecified as acute or chronic, without mention of hemorrhage or perforation      History of blood transfusion 2007     Hypertension         CC Referred Self, MD  No address on file on close of this encounter.      Again, thank you for allowing me to participate in the care of your patient.        Sincerely,        Lonnie Rascon MD     no

## 2023-03-02 NOTE — H&P CARDIOLOGY - HISTORY OF PRESENT ILLNESS
71 yo Man with PMHx of HTN, HLD, DMT2 Presents for cardiac cath.     Card: Dr. Lai Vieira        TTE on 1/25 EF 55-60%, mild AS  Exercise ST in 1/27: no sig ST abnormalities    < from: TTE Echo Complete w/Doppler (11.17.18 @ 09:08) >   1. Left ventricular ejection fraction, by visual estimation, is 55 to 60%.   2. Normal global left ventricular systolic function.   3. Spectral Doppler shows pseudonormal pattern of left ventricular myocardial filling (Grade II diastolic dysfunction).   4. There is mild septal left ventricular hypertrophy.   5. Moderately enlarged right ventricle.   6. There is no evidence of pericardial effusion.   7. Thickening of the anterior and posterior mitral valve leaflets.   8. Mild tricuspid regurgitation.   9. Sclerotic aortic valve with normal opening.  10. Pulmonic valve regurgitation.  11. Structurally normal pulmonic valve, with normal leaflet excursion.  12. Estimated pulmonary artery systolic pressure is 55.5 mmHg assuming a right atrial pressure of 10 mmHg, which is consistent with moderate pulmonary hypertension.  13. The aortic valve mean gradient is 5.6 mmHg consistent with normally openingaortic valve. < end of copied text >   73 yo Man with PMHx of HTN, HLD, DMT2 (last A1C 6.2) presents for cardiac cath. pt reports he has been feeling intermittent SOB and dizziness, evaluated by Dr. Vieira and had abnormal cardiac CTA after having abnormal TTE and exercise stress test. He denies chest pain, palpitation or SOB currently.     Card: Dr. Lai Vieira    Cardiac CT 2/22/23  Calcium score 1262  mLAD 70% large focal calcified plaque with possible severe stenosis, OM1 50-69%    TTE on 1/25 EF 55-60%, mild AS  Exercise ST in 1/27: no sig ST abnormalities    < from: TTE Echo Complete w/Doppler (11.17.18 @ 09:08) >   1. Left ventricular ejection fraction, by visual estimation, is 55 to 60%.   2. Normal global left ventricular systolic function.   3. Spectral Doppler shows pseudonormal pattern of left ventricular myocardial filling (Grade II diastolic dysfunction).   4. There is mild septal left ventricular hypertrophy.   5. Moderately enlarged right ventricle.   6. There is no evidence of pericardial effusion.   7. Thickening of the anterior and posterior mitral valve leaflets.   8. Mild tricuspid regurgitation.   9. Sclerotic aortic valve with normal opening.  10. Pulmonic valve regurgitation.  11. Structurally normal pulmonic valve, with normal leaflet excursion.  12. Estimated pulmonary artery systolic pressure is 55.5 mmHg assuming a right atrial pressure of 10 mmHg, which is consistent with moderate pulmonary hypertension.  13. The aortic valve mean gradient is 5.6 mmHg consistent with normally opening aortic valve. < end of copied text >

## 2023-03-02 NOTE — H&P CARDIOLOGY - NSICDXPASTSURGICALHX_GEN_ALL_CORE_FT
PAST SURGICAL HISTORY:  H/O total hip arthroplasty, right Right hip replacement 2010    Recent retinal detachment, total, bilateral 1989 (left), 2006 (right)    S/P appendectomy     S/P hip replacement, left 2006

## 2023-03-02 NOTE — ASU PATIENT PROFILE, ADULT - NSICDXPASTMEDICALHX_GEN_ALL_CORE_FT
PAST MEDICAL HISTORY:  Glaucoma     Hip pain, right     Hypertension     Macular degeneration of both eyes     Retinal detachments and defects bilateral

## 2023-03-02 NOTE — H&P CARDIOLOGY - NSICDXPASTMEDICALHX_GEN_ALL_CORE_FT
PAST MEDICAL HISTORY:  Glaucoma     Hip pain, right     Hypertension     Macular degeneration of both eyes     Retinal detachments and defects bilateral     PAST MEDICAL HISTORY:  DM (diabetes mellitus)     Glaucoma     Hip pain, right     Hypertension     Macular degeneration of both eyes     Retinal detachments and defects bilateral

## 2023-03-02 NOTE — CONSULT NOTE ADULT - SUBJECTIVE AND OBJECTIVE BOX
History of Present Illness:  72y Male    Past Medical History  Hip pain, right    Hypertension    Glaucoma    Retinal detachments and defects  bilateral    Macular degeneration of both eyes    DM (diabetes mellitus)        Past Surgical History  S/P hip replacement, left  2006    Recent retinal detachment, total, bilateral  1989 (left), 2006 (right)    S/P appendectomy    H/O total hip arthroplasty, right  Right hip replacement 2010        MEDICATIONS  (STANDING):  atorvastatin 10 milliGRAM(s) Oral at bedtime  dextrose 5%. 1000 milliLiter(s) (50 mL/Hr) IV Continuous <Continuous>  dextrose 5%. 1000 milliLiter(s) (100 mL/Hr) IV Continuous <Continuous>  dextrose 50% Injectable 25 Gram(s) IV Push once  dextrose 50% Injectable 12.5 Gram(s) IV Push once  dextrose 50% Injectable 25 Gram(s) IV Push once  dorzolamide 2% Ophthalmic Solution 1 Drop(s) Both EYES <User Schedule>  glucagon  Injectable 1 milliGRAM(s) IntraMuscular once  insulin lispro (ADMELOG) corrective regimen sliding scale   SubCutaneous three times a day before meals  insulin lispro (ADMELOG) corrective regimen sliding scale   SubCutaneous at bedtime  metoprolol tartrate 12.5 milliGRAM(s) Oral every 12 hours  montelukast 10 milliGRAM(s) Oral daily  sodium chloride 0.9%. 1000 milliLiter(s) (250 mL/Hr) IV Continuous <Continuous>  sodium chloride 0.9%. 1000 milliLiter(s) (75 mL/Hr) IV Continuous <Continuous>  tamsulosin 0.4 milliGRAM(s) Oral at bedtime    MEDICATIONS  (PRN):  dextrose Oral Gel 15 Gram(s) Oral once PRN Blood Glucose LESS THAN 70 milliGRAM(s)/deciliter    Antiplatelet therapy:                           Last dose/amt:    Allergies: Coumadin (Other (Mod to Severe); Flushing)      SOCIAL HISTORY:  Smoker: [ ] Yes  [ ] No        PACK YEARS:                         WHEN QUIT?  ETOH use: [ ] Yes  [ ] No              FREQUENCY / QUANTITY:  Ilicit Drug use:  [ ] Yes  [ ] No  Occupation:  Live with:  Assist device use:    Relevant Family History  FAMILY HISTORY:  Family history of breast cancer in mother (Mother)    FH: pancreatic cancer (Father)        Review of Systems  GENERAL:  Fevers[] chills[] sweats[] fatigue[] weight loss[] weight gain []                                        NEURO:  parathesias[] seizures []  syncope []  confusion []                                                                                  EYES: glasses[]  blurry vision[]  discharge[] pain[] glaucoma []                                                                            ENMT:  difficulty hearing []  vertigo[]  dysphagia[] epistaxis[] recent dental work []                                      CV:  chest pain[] palpitations[] SINGH [] diaphoresis [] edema[]                                                                                             RESPIRATORY:  wheezing[] SOB[] cough [] sputum[] hemoptysis[]                                                                    GI:  nausea[]  vomiting []  diarrhea[] constipation [] melena []                                                                        : hematuria[ ]  dysuria[ ] urgency[] incontinence[]                                                                                              MUSKULOSKELETAL:  arthritis[ ]  joint swelling [ ] muscle weakness [ ]                                                                  SKIN/BREAST:  rash[ ] itching [ ]  hair loss[ ] masses[ ]                                                                                                PSYCH:  dementia [ ] depresion [ ] anxiety[ ]                                                                                                                  HEME/LYMPH:  bruises easily[ ] enlarged lymph nodes[ ] tender lymph nodes[ ]                                                 ENDOCRINE:  cold intolerance[ ] heat intolerance[ ] polydipsia[ ]                                                                              PHYSICAL EXAM  Vital Signs Last 24 Hrs  T(C): 36.7 (02 Mar 2023 13:00), Max: 36.8 (02 Mar 2023 08:58)  T(F): 98 (02 Mar 2023 13:00), Max: 98.2 (02 Mar 2023 08:58)  HR: 77 (02 Mar 2023 19:30) (62 - 90)  BP: 166/75 (02 Mar 2023 19:30) (117/56 - 166/75)  BP(mean): 85 (02 Mar 2023 08:58) (85 - 85)  RR: 16 (02 Mar 2023 19:30) (14 - 22)  SpO2: 99% (02 Mar 2023 19:30) (94% - 99%)    Parameters below as of 02 Mar 2023 13:20  Patient On (Oxygen Delivery Method): room air        General: Well nourished, well developed, no acute distress.                                                         Neuro: Normal exam oriented to person/place & time with no focal motor or sensory  deficits.                    Eyes: Normal exam of conjunctiva & lids, pupils equally reactive.   ENT: Normal exam of nasal/oral mucosa with absence of cyanosis.   Neck: Normal exam of jugular veins, trachea & thyroid.   Chest: Normal lung exam with good air movement absence of wheezes, rales, or rhonchi:                                                                          CV:  Auscultation: normal [ ] S3[ ] S4[ ] Irregular [ ] Rub[ ] Clicks[ ]  Murmurs none:[ ]systolic [ ]  diastolic [ ] holosystolic [ ]  Carotids: No Bruits[ ] Other____________ Abdominal Aorta: normal [ ] nonpalpable[ ]                                                                         GI: Normal exam of abdomen, liver & spleen with no noted masses or tenderness.                                                                                              Extremities: Normal no evidence of cyanosis or deformity Edema: none[ ]trace[ ]1+[ ]2+[ ]3+[ ]4+[ ]  Lower Extremity Pulses: Right[ ] Left[ ]Varicosities[ ]  SKIN : Normal exam to inspection & palation.                                                           LABS:                        14.3   4.87  )-----------( 213      ( 02 Mar 2023 09:45 )             42.4     03-02    140  |  103  |  20  ----------------------------<  124<H>  4.4   |  25  |  1.15    Ca    9.3      02 Mar 2023 09:45    TPro  7.6  /  Alb  4.7  /  TBili  0.3  /  DBili  0.1  /  AST  25  /  ALT  32  /  AlkPhos  100  03-02    PT/INR - ( 02 Mar 2023 19:19 )   PT: 12.5 sec;   INR: 1.09 ratio         PTT - ( 02 Mar 2023 19:19 )  PTT:27.8 sec            Cardiac Cath:    TTE / TOMEKA:               History of Present Illness:  73 yo Man with PMHx of HTN, HLD, DMT2 (last A1C 6.2) presents for cardiac cath. pt reports he has been feeling intermittent SOB and dizziness, evaluated by Dr. Vieira and had abnormal cardiac CTA after having abnormal TTE and exercise stress test. He denies chest pain, palpitation or SOB currently.     Pt now s/p cardiac cath on 3/2/23 revealing triple vessel disease (mLAD 90%, distal Cx 80%, mRCA 80%). Ct surgery consulted for CABG eval with Dr. Torres. All labs and imaging reviewed with Dr. Torres, admission to CT surgery service, likely plan for OR either Tuesday or Thursday.    Past Medical History  Hip pain, right    Hypertension    Glaucoma    Retinal detachments and defects  bilateral    Macular degeneration of both eyes    DM (diabetes mellitus)        Past Surgical History  S/P hip replacement, left  2006    Recent retinal detachment, total, bilateral  1989 (left), 2006 (right)    S/P appendectomy    H/O total hip arthroplasty, right  Right hip replacement 2010        MEDICATIONS  (STANDING):  atorvastatin 10 milliGRAM(s) Oral at bedtime  dextrose 5%. 1000 milliLiter(s) (50 mL/Hr) IV Continuous <Continuous>  dextrose 5%. 1000 milliLiter(s) (100 mL/Hr) IV Continuous <Continuous>  dextrose 50% Injectable 25 Gram(s) IV Push once  dextrose 50% Injectable 12.5 Gram(s) IV Push once  dextrose 50% Injectable 25 Gram(s) IV Push once  dorzolamide 2% Ophthalmic Solution 1 Drop(s) Both EYES <User Schedule>  glucagon  Injectable 1 milliGRAM(s) IntraMuscular once  insulin lispro (ADMELOG) corrective regimen sliding scale   SubCutaneous three times a day before meals  insulin lispro (ADMELOG) corrective regimen sliding scale   SubCutaneous at bedtime  metoprolol tartrate 12.5 milliGRAM(s) Oral every 12 hours  montelukast 10 milliGRAM(s) Oral daily  sodium chloride 0.9%. 1000 milliLiter(s) (250 mL/Hr) IV Continuous <Continuous>  sodium chloride 0.9%. 1000 milliLiter(s) (75 mL/Hr) IV Continuous <Continuous>  tamsulosin 0.4 milliGRAM(s) Oral at bedtime    MEDICATIONS  (PRN):  dextrose Oral Gel 15 Gram(s) Oral once PRN Blood Glucose LESS THAN 70 milliGRAM(s)/deciliter    Antiplatelet therapy:  Cilostazol                        Last dose/amt: 3/1    Allergies: Coumadin (Other (Mod to Severe); Flushing)      SOCIAL HISTORY:  Smoker: [ ] Yes  [x ] No        PACK YEARS:                         WHEN QUIT?  ETOH use: [x ] Yes  [ ] No  Quit years ago            FREQUENCY / QUANTITY:  Ilicit Drug use:  [ ] Yes  [x ] No  Occupation: Retired mailman  Live with: wife and daughter  Assist device use: denies    Relevant Family History  FAMILY HISTORY:  Family history of breast cancer in mother (Mother)    FH: pancreatic cancer (Father)        Review of Systems  GENERAL:  Fevers[] chills[] sweats[] fatigue[] weight loss[] weight gain []                                        NEURO:  parathesias[] seizures []  syncope []  confusion []                                                                                  EYES: glasses[]  blurry vision[]  discharge[] pain[] glaucoma []                                                                            ENMT:  difficulty hearing []  vertigo[]  dysphagia[] epistaxis[] recent dental work []                                      CV:  chest pain[] palpitations[] SINGH [] diaphoresis [] edema[]                                                                                             RESPIRATORY:  wheezing[] SOB[] cough [] sputum[] hemoptysis[]                                                                    GI:  nausea[]  vomiting []  diarrhea[] constipation [] melena []                                                                        : hematuria[ ]  dysuria[ ] urgency[] incontinence[]                                                                                              MUSKULOSKELETAL:  arthritis[ ]  joint swelling [ ] muscle weakness [ ]                                                                  SKIN/BREAST:  rash[ ] itching [ ]  hair loss[ ] masses[ ]                                                                                                PSYCH:  dementia [ ] depresion [ ] anxiety[ ]                                                                                                                  HEME/LYMPH:  bruises easily[ ] enlarged lymph nodes[ ] tender lymph nodes[ ]                                                 ENDOCRINE:  cold intolerance[ ] heat intolerance[ ] polydipsia[ ]                                                                              PHYSICAL EXAM  Vital Signs Last 24 Hrs  T(C): 36.7 (02 Mar 2023 13:00), Max: 36.8 (02 Mar 2023 08:58)  T(F): 98 (02 Mar 2023 13:00), Max: 98.2 (02 Mar 2023 08:58)  HR: 77 (02 Mar 2023 19:30) (62 - 90)  BP: 166/75 (02 Mar 2023 19:30) (117/56 - 166/75)  BP(mean): 85 (02 Mar 2023 08:58) (85 - 85)  RR: 16 (02 Mar 2023 19:30) (14 - 22)  SpO2: 99% (02 Mar 2023 19:30) (94% - 99%)    Parameters below as of 02 Mar 2023 13:20  Patient On (Oxygen Delivery Method): room air        General: Well nourished, well developed, no acute distress.                                                         Neuro: Normal exam oriented to person/place & time with no focal motor or sensory  deficits.                    Eyes: Normal exam of conjunctiva & lids, pupils equally reactive.   ENT: Normal exam of nasal/oral mucosa with absence of cyanosis.   Neck: Normal exam of jugular veins, trachea & thyroid.   Chest: Normal lung exam with good air movement absence of wheezes, rales, or rhonchi:                                                                          CV:  Auscultation: normal [ x] S3[ ] S4[ ] Irregular [ ] Rub[ ] Clicks[ ]  Murmurs none:[x ]systolic [ ]  diastolic [ ] holosystolic [ ]  Carotids: No Bruits[ x] Other____________ Abdominal Aorta: normal [x ] nonpalpable[ ]                                                                         GI: Normal exam of abdomen, liver & spleen with no noted masses or tenderness.                                                                                              Extremities: Normal no evidence of cyanosis or deformity Edema: none[ x]trace[ ]1+[ ]2+[ ]3+[ ]4+[ ]  Lower Extremity Pulses: Right[x ] Left[x ]Varicosities[ ]  SKIN : Normal exam to inspection & palation.                                                           LABS:                        14.3   4.87  )-----------( 213      ( 02 Mar 2023 09:45 )             42.4     03-02    140  |  103  |  20  ----------------------------<  124<H>  4.4   |  25  |  1.15    Ca    9.3      02 Mar 2023 09:45    TPro  7.6  /  Alb  4.7  /  TBili  0.3  /  DBili  0.1  /  AST  25  /  ALT  32  /  AlkPhos  100  03-02    PT/INR - ( 02 Mar 2023 19:19 )   PT: 12.5 sec;   INR: 1.09 ratio         PTT - ( 02 Mar 2023 19:19 )  PTT:27.8 sec            Cardiac Cath:    TTE / TOMEKA:    < from: [facility] Duplex Carotid, Bilat (03.02.23 @ 15:15) >  IMPRESSION: Calcified plaque in the left internal carotid artery creating   a hemodynamically significant stenosis. The degree of luminal narrowing   is estimated at 50-69% by diameter.    Mixed plaque in the left internal carotid artery without duplex evidence   of hemodynamically significant stenosis.    Bilateral mild to moderate external carotid artery stenoses.      < from: CT Fractional Flow Reserve (FFR-CT) Data Prep Transmission And Analysis Interpretation (02.22.23 @ 14:39) >  Impression:    Left main (LM)coronary artery  Normal FFR-CT analysis of the LM.    Left anterior descending (LAD) coronary artery  FFR-CT value 0.89 in proximal LAD consistent with non-flow-limiting   coronary artery disease.  FFR-CT value 0.79 in mid and 0.75 in distal LAD corresponding to regions   of partially calcified and calcified plaque with moderate and severe   stenosis on CCTA consistent with high likelihood of lesion specific   ischemia.    Left circumflex (LCX) coronary artery  FFR-CT value 0.75 in mid-distal Lcxin the region of partially calcified   plaque with indeterminate stenosis consistent with  high likelihood of   lesion specific ischemia.  FFR-CT values 0.90 and 0.88 in OM1 and proximal segment of L-PLB branch   corresponding to partially calcified and calcified plaque consistent with   non-flow-limiting coronary artery disease.    Right coronary artery (RCA)  FFR-CT value 0.93 in non dominant RCA consistent with non-flow-limiting   coronary artery disease.    < end of copied text >  < from: CT Angio Heart and Coronaries w/ IV Cont (02.22.23 @ 09:49) >  IMPRESSION:  1. Mid LAD has  partially calcified plaque with moderate, up to 70%   stenosis; and a large focal calcified plaque withpossibly severe   stenosis, blooming artifact from calcium precludes accurate assessment of   luminal narrowing. OM1 has probable moderate (50-69%) stenosis at ostium.  There is indeterminate stenosis of distal LAD, mid-distal Lcx, distal   segment of OM1 and LPL, and mid RCA. Rest of the coronary findings as   detailed above.  Study will be sent for CT FFR for further evaluation of hemodynamic   significance of the plaque burden and a separate report will be generated.    2. Agatston calcium score of 1262, which is at 83rd percentile for   individuals of the same age, gender, and race/ethnicity who are free of   clinical cardiovascular disease and treated diabetes by the COELHO   calculator.    3. Cannot rule out small PFO vs atrial septal pouch, recommend   correlation with echocardiogram as clinically indicated. Borderline   increased left ventricular wall thickness.

## 2023-03-02 NOTE — ASU PATIENT PROFILE, ADULT - AS SC BRADEN MOISTURE
Pt admitted to [facility] 9/21 thru 9/23/21 for acute embolic stroke, new onset afib, and Type 2 diabetes. Per the hospitalist notes, pt was found to be newly diabetic and well as hypertensive. Transitioned to BB, Xarelto, and Jardiance. Hospitalist ordered diabetic supplies. Plan was for diabetic education and follow up with cardiology for the Xarelto and BB/ afib.  Diabetic supplies were ordered for the patient at discharge from [facility]. Follow up with the PCP was scheduled 9/28/21.    Prep for Case orders do not include diabetes medication or orders for Lovenox bridging ( Per Damian, patient has NOT been taking Xarelto as ordered and unaware of the bridging orders discussed at OV 10/21/21).    Prep for case orders:  Instructions    LE ANGIO scheduled with Dr Porter on 11/8/21 at 10am with arrival at 7am due to Renal Hydration.  NPO and other recommendations per anesthesia guidelines.   Covid per pre-op protocol.   H&P done by: 10/21/21 Dr Porter  Pt will have CBC, & BMP labs done 5 days prior to the procedure.  Message sent to provider on medication clarification.  Message sent to provider for admission orders.           Please advise. Patient needs more education on his health problems and pre procedure orders.   (4) rarely moist

## 2023-03-02 NOTE — CONSULT NOTE ADULT - NS ATTEND AMEND GEN_ALL_CORE FT
Mr. Reza has multivessel coronary disease and is a good candidate for CABG.    I have quote an operative risk of 1%, in addition to a 1% risk of stroke, as well as the risks bleeding and infection.    He is eager to proceed next week.

## 2023-03-02 NOTE — H&P CARDIOLOGY - NSICDXFAMILYHX_GEN_ALL_CORE_FT
FAMILY HISTORY:  Mother  Still living? No  Family history of breast cancer in mother, Age at diagnosis: Age Unknown     FAMILY HISTORY:  Father  Still living? No  FH: pancreatic cancer, Age at diagnosis: Age Unknown    Mother  Still living? No  Family history of breast cancer in mother, Age at diagnosis: Age Unknown

## 2023-03-02 NOTE — ASU PATIENT PROFILE, ADULT - FALL HARM RISK - UNIVERSAL INTERVENTIONS
Bed in lowest position, wheels locked, appropriate side rails in place/Call bell, personal items and telephone in reach/Instruct patient to call for assistance before getting out of bed or chair/Non-slip footwear when patient is out of bed/Leslie to call system/Physically safe environment - no spills, clutter or unnecessary equipment/Purposeful Proactive Rounding/Room/bathroom lighting operational, light cord in reach

## 2023-03-03 LAB
A1C WITH ESTIMATED AVERAGE GLUCOSE RESULT: 6 % — HIGH (ref 4–5.6)
A1C WITH ESTIMATED AVERAGE GLUCOSE RESULT: 6.1 % — HIGH (ref 4–5.6)
ALBUMIN SERPL ELPH-MCNC: 4.2 G/DL — SIGNIFICANT CHANGE UP (ref 3.3–5)
ALP SERPL-CCNC: 92 U/L — SIGNIFICANT CHANGE UP (ref 40–120)
ALT FLD-CCNC: 26 U/L — SIGNIFICANT CHANGE UP (ref 10–45)
ANION GAP SERPL CALC-SCNC: 13 MMOL/L — SIGNIFICANT CHANGE UP (ref 5–17)
APPEARANCE UR: CLEAR — SIGNIFICANT CHANGE UP
APTT BLD: 31.7 SEC — SIGNIFICANT CHANGE UP (ref 27.5–35.5)
AST SERPL-CCNC: 22 U/L — SIGNIFICANT CHANGE UP (ref 10–40)
BACTERIA # UR AUTO: NEGATIVE — SIGNIFICANT CHANGE UP
BASOPHILS # BLD AUTO: 0.03 K/UL — SIGNIFICANT CHANGE UP (ref 0–0.2)
BASOPHILS NFR BLD AUTO: 0.6 % — SIGNIFICANT CHANGE UP (ref 0–2)
BILIRUB SERPL-MCNC: 0.2 MG/DL — SIGNIFICANT CHANGE UP (ref 0.2–1.2)
BILIRUB UR-MCNC: NEGATIVE — SIGNIFICANT CHANGE UP
BLD GP AB SCN SERPL QL: NEGATIVE — SIGNIFICANT CHANGE UP
BUN SERPL-MCNC: 22 MG/DL — SIGNIFICANT CHANGE UP (ref 7–23)
CALCIUM SERPL-MCNC: 8.7 MG/DL — SIGNIFICANT CHANGE UP (ref 8.4–10.5)
CHLORIDE SERPL-SCNC: 103 MMOL/L — SIGNIFICANT CHANGE UP (ref 96–108)
CO2 SERPL-SCNC: 20 MMOL/L — LOW (ref 22–31)
COLOR SPEC: SIGNIFICANT CHANGE UP
CREAT SERPL-MCNC: 1.13 MG/DL — SIGNIFICANT CHANGE UP (ref 0.5–1.3)
DIFF PNL FLD: NEGATIVE — SIGNIFICANT CHANGE UP
EGFR: 68 ML/MIN/1.73M2 — SIGNIFICANT CHANGE UP
EOSINOPHIL # BLD AUTO: 0.13 K/UL — SIGNIFICANT CHANGE UP (ref 0–0.5)
EOSINOPHIL NFR BLD AUTO: 2.8 % — SIGNIFICANT CHANGE UP (ref 0–6)
EPI CELLS # UR: 0 /HPF — SIGNIFICANT CHANGE UP
ESTIMATED AVERAGE GLUCOSE: 126 MG/DL — HIGH (ref 68–114)
ESTIMATED AVERAGE GLUCOSE: 128 MG/DL — HIGH (ref 68–114)
FIBRINOGEN PPP-MCNC: 288 MG/DL — SIGNIFICANT CHANGE UP (ref 200–445)
GLUCOSE BLDC GLUCOMTR-MCNC: 116 MG/DL — HIGH (ref 70–99)
GLUCOSE BLDC GLUCOMTR-MCNC: 116 MG/DL — HIGH (ref 70–99)
GLUCOSE BLDC GLUCOMTR-MCNC: 121 MG/DL — HIGH (ref 70–99)
GLUCOSE BLDC GLUCOMTR-MCNC: 95 MG/DL — SIGNIFICANT CHANGE UP (ref 70–99)
GLUCOSE SERPL-MCNC: 116 MG/DL — HIGH (ref 70–99)
GLUCOSE UR QL: NEGATIVE — SIGNIFICANT CHANGE UP
HCT VFR BLD CALC: 39.2 % — SIGNIFICANT CHANGE UP (ref 39–50)
HGB BLD-MCNC: 13.4 G/DL — SIGNIFICANT CHANGE UP (ref 13–17)
HYALINE CASTS # UR AUTO: 1 /LPF — SIGNIFICANT CHANGE UP (ref 0–2)
IMM GRANULOCYTES NFR BLD AUTO: 0.4 % — SIGNIFICANT CHANGE UP (ref 0–0.9)
INR BLD: 0.97 RATIO — SIGNIFICANT CHANGE UP (ref 0.88–1.16)
KETONES UR-MCNC: NEGATIVE — SIGNIFICANT CHANGE UP
LEUKOCYTE ESTERASE UR-ACNC: NEGATIVE — SIGNIFICANT CHANGE UP
LYMPHOCYTES # BLD AUTO: 1.48 K/UL — SIGNIFICANT CHANGE UP (ref 1–3.3)
LYMPHOCYTES # BLD AUTO: 31.4 % — SIGNIFICANT CHANGE UP (ref 13–44)
MCHC RBC-ENTMCNC: 30.6 PG — SIGNIFICANT CHANGE UP (ref 27–34)
MCHC RBC-ENTMCNC: 34.2 GM/DL — SIGNIFICANT CHANGE UP (ref 32–36)
MCV RBC AUTO: 89.5 FL — SIGNIFICANT CHANGE UP (ref 80–100)
MONOCYTES # BLD AUTO: 0.64 K/UL — SIGNIFICANT CHANGE UP (ref 0–0.9)
MONOCYTES NFR BLD AUTO: 13.6 % — SIGNIFICANT CHANGE UP (ref 2–14)
MRSA PCR RESULT.: SIGNIFICANT CHANGE UP
NEUTROPHILS # BLD AUTO: 2.41 K/UL — SIGNIFICANT CHANGE UP (ref 1.8–7.4)
NEUTROPHILS NFR BLD AUTO: 51.2 % — SIGNIFICANT CHANGE UP (ref 43–77)
NITRITE UR-MCNC: NEGATIVE — SIGNIFICANT CHANGE UP
NRBC # BLD: 0 /100 WBCS — SIGNIFICANT CHANGE UP (ref 0–0)
NT-PROBNP SERPL-SCNC: <36 PG/ML — SIGNIFICANT CHANGE UP (ref 0–300)
PA ADP PRP-ACNC: 187 PRU — LOW (ref 194–417)
PH UR: 5.5 — SIGNIFICANT CHANGE UP (ref 5–8)
PLATELET # BLD AUTO: 186 K/UL — SIGNIFICANT CHANGE UP (ref 150–400)
POTASSIUM SERPL-MCNC: 4.1 MMOL/L — SIGNIFICANT CHANGE UP (ref 3.5–5.3)
POTASSIUM SERPL-SCNC: 4.1 MMOL/L — SIGNIFICANT CHANGE UP (ref 3.5–5.3)
PROT SERPL-MCNC: 6.9 G/DL — SIGNIFICANT CHANGE UP (ref 6–8.3)
PROT UR-MCNC: NEGATIVE — SIGNIFICANT CHANGE UP
PROTHROM AB SERPL-ACNC: 11.1 SEC — SIGNIFICANT CHANGE UP (ref 10.5–13.4)
RBC # BLD: 4.38 M/UL — SIGNIFICANT CHANGE UP (ref 4.2–5.8)
RBC # FLD: 12.4 % — SIGNIFICANT CHANGE UP (ref 10.3–14.5)
RBC CASTS # UR COMP ASSIST: 2 /HPF — SIGNIFICANT CHANGE UP (ref 0–4)
RH IG SCN BLD-IMP: POSITIVE — SIGNIFICANT CHANGE UP
S AUREUS DNA NOSE QL NAA+PROBE: SIGNIFICANT CHANGE UP
SODIUM SERPL-SCNC: 136 MMOL/L — SIGNIFICANT CHANGE UP (ref 135–145)
SP GR SPEC: 1.02 — SIGNIFICANT CHANGE UP (ref 1.01–1.02)
T4 FREE SERPL-MCNC: 1.1 NG/DL — SIGNIFICANT CHANGE UP (ref 0.9–1.8)
TSH SERPL-MCNC: 5.09 UIU/ML — HIGH (ref 0.27–4.2)
UROBILINOGEN FLD QL: NEGATIVE — SIGNIFICANT CHANGE UP
WBC # BLD: 4.71 K/UL — SIGNIFICANT CHANGE UP (ref 3.8–10.5)
WBC # FLD AUTO: 4.71 K/UL — SIGNIFICANT CHANGE UP (ref 3.8–10.5)
WBC UR QL: 0 /HPF — SIGNIFICANT CHANGE UP (ref 0–5)

## 2023-03-03 PROCEDURE — 93306 TTE W/DOPPLER COMPLETE: CPT | Mod: 26

## 2023-03-03 PROCEDURE — 94010 BREATHING CAPACITY TEST: CPT | Mod: 26

## 2023-03-03 PROCEDURE — 71045 X-RAY EXAM CHEST 1 VIEW: CPT | Mod: 26

## 2023-03-03 RX ORDER — METOPROLOL TARTRATE 50 MG
25 TABLET ORAL EVERY 12 HOURS
Refills: 0 | Status: DISCONTINUED | OUTPATIENT
Start: 2023-03-03 | End: 2023-03-07

## 2023-03-03 RX ADMIN — DORZOLAMIDE HYDROCHLORIDE 1 DROP(S): 20 SOLUTION/ DROPS OPHTHALMIC at 12:45

## 2023-03-03 RX ADMIN — SODIUM CHLORIDE 3 MILLILITER(S): 9 INJECTION INTRAMUSCULAR; INTRAVENOUS; SUBCUTANEOUS at 12:48

## 2023-03-03 RX ADMIN — ENOXAPARIN SODIUM 40 MILLIGRAM(S): 100 INJECTION SUBCUTANEOUS at 22:15

## 2023-03-03 RX ADMIN — TAMSULOSIN HYDROCHLORIDE 0.4 MILLIGRAM(S): 0.4 CAPSULE ORAL at 22:12

## 2023-03-03 RX ADMIN — ATORVASTATIN CALCIUM 10 MILLIGRAM(S): 80 TABLET, FILM COATED ORAL at 22:14

## 2023-03-03 RX ADMIN — Medication 325 MILLIGRAM(S): at 12:46

## 2023-03-03 RX ADMIN — SODIUM CHLORIDE 3 MILLILITER(S): 9 INJECTION INTRAMUSCULAR; INTRAVENOUS; SUBCUTANEOUS at 06:35

## 2023-03-03 RX ADMIN — Medication 25 MILLIGRAM(S): at 18:05

## 2023-03-03 RX ADMIN — Medication 12.5 MILLIGRAM(S): at 05:52

## 2023-03-03 RX ADMIN — DORZOLAMIDE HYDROCHLORIDE 1 DROP(S): 20 SOLUTION/ DROPS OPHTHALMIC at 22:13

## 2023-03-03 NOTE — PROGRESS NOTE ADULT - PROBLEM SELECTOR PLAN 2
A1C with Estimated Average Glucose Result: 6.0:     Blood glucoses' well controlled     Will hold oral agents while inpatient    Continue Admelog sliding scale.

## 2023-03-03 NOTE — PROGRESS NOTE ADULT - PROBLEM SELECTOR PLAN 1
cardiac cath on 3/2/23 revealing triple vessel disease (mLAD 90%, distal Cx 80%, mRCA 80%)    Pre-op CABG /w Dr. Torres.  Eval in progress    -Continue Lopressor 12.5mg BID  -[id number] 187 today  -Continue ASA/Statin   -Continue Lovenox 40mg QD  -PFT's Completed  - Urinalysis / MRSA PCR ordered  CXR completed   Plan for OR Tues/Thurs

## 2023-03-03 NOTE — PROGRESS NOTE ADULT - SUBJECTIVE AND OBJECTIVE BOX
Subjective: " "    TELEMETRY:    VITAL SIGNS    Vital Signs Last 24 Hrs  T(C): 36.5 (03-03-23 @ 04:23), Max: 37.2 (03-02-23 @ 20:00)  T(F): 97.7 (03-03-23 @ 04:23), Max: 99 (03-02-23 @ 20:00)  HR: 68 (03-03-23 @ 04:23) (62 - 90)  BP: 129/67 (03-03-23 @ 04:23) (117/56 - 166/75)  RR: 16 (03-03-23 @ 04:23) (14 - 22)  SpO2: 95% (03-03-23 @ 04:23) (94% - 99%)            03-02 @ 07:01  -  03-03 @ 07:00  --------------------------------------------------------  IN: 240 mL / OUT: 0 mL / NET: 240 mL       Daily     Daily   Admit Wt: Drug Dosing Weight  Height (cm): 177.8 (02 Mar 2023 09:32)  Weight (kg): 93 (02 Mar 2023 09:32)  BMI (kg/m2): 29.4 (02 Mar 2023 09:32)  BSA (m2): 2.11 (02 Mar 2023 09:32)    Bilirubin Total, Serum: 0.2 mg/dL (03-03 @ 05:59)  Bilirubin Direct, Serum: 0.1 mg/dL (03-02 @ 19:20)  Bilirubin Total, Serum: 0.3 mg/dL (03-02 @ 19:20)    CAPILLARY BLOOD GLUCOSE      POCT Blood Glucose.: 116 mg/dL (03 Mar 2023 07:47)  POCT Blood Glucose.: 105 mg/dL (02 Mar 2023 22:06)              PHYSICAL EXAM    Neurology: alert and oriented x 3, nonfocal, no gross deficits  CV : tele:  RSR  Sternal Wound :  CDI with dressing , Stable  Lungs: clear. RR easy, unlabored   Abdomen: soft, nontender, nondistended, positive bowel sounds, bowel movement   Neg N/V/D   :  pt voiding without difficulty   Extremities:   MARVIN; edema, neg calf tenderness.   PPP bilaterally      PW:    Chest tubes:        aspirin enteric coated 325 milliGRAM(s) Oral daily  atorvastatin 10 milliGRAM(s) Oral at bedtime  dextrose 5%. 1000 milliLiter(s) IV Continuous <Continuous>  dextrose 5%. 1000 milliLiter(s) IV Continuous <Continuous>  dextrose 50% Injectable 25 Gram(s) IV Push once  dextrose 50% Injectable 12.5 Gram(s) IV Push once  dextrose 50% Injectable 25 Gram(s) IV Push once  dextrose Oral Gel 15 Gram(s) Oral once PRN  dorzolamide 2% Ophthalmic Solution 1 Drop(s) Both EYES <User Schedule>  enoxaparin Injectable 40 milliGRAM(s) SubCutaneous every 24 hours  glucagon  Injectable 1 milliGRAM(s) IntraMuscular once  insulin lispro (ADMELOG) corrective regimen sliding scale   SubCutaneous three times a day before meals  insulin lispro (ADMELOG) corrective regimen sliding scale   SubCutaneous at bedtime  metoprolol tartrate 12.5 milliGRAM(s) Oral every 12 hours  montelukast 10 milliGRAM(s) Oral daily  sodium chloride 0.9% lock flush 3 milliLiter(s) IV Push every 8 hours  sodium chloride 0.9%. 1000 milliLiter(s) IV Continuous <Continuous>  sodium chloride 0.9%. 1000 milliLiter(s) IV Continuous <Continuous>  tamsulosin 0.4 milliGRAM(s) Oral at bedtime                         Subjective: "I feel ok "  Patient denies chest pain or SOB    TELEMETRY: NSR     VITAL SIGNS    Vital Signs Last 24 Hrs  T(C): 36.5 (03-03-23 @ 04:23), Max: 37.2 (03-02-23 @ 20:00)  T(F): 97.7 (03-03-23 @ 04:23), Max: 99 (03-02-23 @ 20:00)  HR: 68 (03-03-23 @ 04:23) (62 - 90)  BP: 129/67 (03-03-23 @ 04:23) (117/56 - 166/75)  RR: 16 (03-03-23 @ 04:23) (14 - 22)  SpO2: 95% (03-03-23 @ 04:23) (94% - 99%)            03-02 @ 07:01  -  03-03 @ 07:00  --------------------------------------------------------  IN: 240 mL / OUT: 0 mL / NET: 240 mL       Daily     Daily   Admit Wt: Drug Dosing Weight  Height (cm): 177.8 (02 Mar 2023 09:32)  Weight (kg): 93 (02 Mar 2023 09:32)  BMI (kg/m2): 29.4 (02 Mar 2023 09:32)  BSA (m2): 2.11 (02 Mar 2023 09:32)    Bilirubin Total, Serum: 0.2 mg/dL (03-03 @ 05:59)  Bilirubin Direct, Serum: 0.1 mg/dL (03-02 @ 19:20)  Bilirubin Total, Serum: 0.3 mg/dL (03-02 @ 19:20)    CAPILLARY BLOOD GLUCOSE      POCT Blood Glucose.: 116 mg/dL (03 Mar 2023 07:47)  POCT Blood Glucose.: 105 mg/dL (02 Mar 2023 22:06)            PHYSICAL EXAM    Neurology: alert and oriented x 3, nonfocal, no gross deficits  CV : RRR+S1/s2  Skin:  No rashes/wounds/lesions noted on sternum/bilateral groins   Lungs: CTA BL. RR easy, unlabored   Abdomen: soft, nontender, nondistended, positive bowel sounds,   Neg N/V/D   :  pt voiding without difficulty   Extremities: Able to MARVIN; Right radial cath site without bleeding/hematoma. No  peripheral edema edema, neg calf tenderness.   PPP bilaterally        aspirin enteric coated 325 milliGRAM(s) Oral daily  atorvastatin 10 milliGRAM(s) Oral at bedtime  dextrose 5%. 1000 milliLiter(s) IV Continuous <Continuous>  dextrose 5%. 1000 milliLiter(s) IV Continuous <Continuous>  dextrose 50% Injectable 25 Gram(s) IV Push once  dextrose 50% Injectable 12.5 Gram(s) IV Push once  dextrose 50% Injectable 25 Gram(s) IV Push once  dextrose Oral Gel 15 Gram(s) Oral once PRN  dorzolamide 2% Ophthalmic Solution 1 Drop(s) Both EYES <User Schedule>  enoxaparin Injectable 40 milliGRAM(s) SubCutaneous every 24 hours  glucagon  Injectable 1 milliGRAM(s) IntraMuscular once  insulin lispro (ADMELOG) corrective regimen sliding scale   SubCutaneous three times a day before meals  insulin lispro (ADMELOG) corrective regimen sliding scale   SubCutaneous at bedtime  metoprolol tartrate 12.5 milliGRAM(s) Oral every 12 hours  montelukast 10 milliGRAM(s) Oral daily  sodium chloride 0.9% lock flush 3 milliLiter(s) IV Push every 8 hours  sodium chloride 0.9%. 1000 milliLiter(s) IV Continuous <Continuous>  sodium chloride 0.9%. 1000 milliLiter(s) IV Continuous <Continuous>  tamsulosin 0.4 milliGRAM(s) Oral at bedtime

## 2023-03-03 NOTE — PROGRESS NOTE ADULT - ASSESSMENT
71 yo Man with PMHx of HTN, HLD, DMT2 (last A1C 6.2) presents for cardiac cath. pt reports he has been feeling intermittent SOB and dizziness, evaluated by Dr. Vieira and had abnormal cardiac CTA after having abnormal TTE and exercise stress test. He denies chest pain, palpitation or SOB currently.     Pt now s/p cardiac cath on 3/2/23 revealing triple vessel disease (mLAD 90%, distal Cx 80%, mRCA 80%). Ct surgery consulted for CABG eval with Dr. Torres.  likely plan for OR either Tuesday or Thursday.    3/3 VSS overnight. HR/BP stable on Lopressor 12.5mg BID. No CP/SOB.  Pending TTE today.  P2y12 187 this AM.  Repeat TSH 5 however remainder of Thyroid panel normal.  UA and MRSA PCR ordered

## 2023-03-04 LAB
GLUCOSE BLDC GLUCOMTR-MCNC: 102 MG/DL — HIGH (ref 70–99)
GLUCOSE BLDC GLUCOMTR-MCNC: 117 MG/DL — HIGH (ref 70–99)
GLUCOSE BLDC GLUCOMTR-MCNC: 121 MG/DL — HIGH (ref 70–99)
GLUCOSE BLDC GLUCOMTR-MCNC: 87 MG/DL — SIGNIFICANT CHANGE UP (ref 70–99)

## 2023-03-04 PROCEDURE — 99231 SBSQ HOSP IP/OBS SF/LOW 25: CPT | Mod: FS

## 2023-03-04 RX ADMIN — SODIUM CHLORIDE 3 MILLILITER(S): 9 INJECTION INTRAMUSCULAR; INTRAVENOUS; SUBCUTANEOUS at 22:00

## 2023-03-04 RX ADMIN — Medication 325 MILLIGRAM(S): at 12:32

## 2023-03-04 RX ADMIN — ATORVASTATIN CALCIUM 10 MILLIGRAM(S): 80 TABLET, FILM COATED ORAL at 22:15

## 2023-03-04 RX ADMIN — MONTELUKAST 10 MILLIGRAM(S): 4 TABLET, CHEWABLE ORAL at 12:30

## 2023-03-04 RX ADMIN — SODIUM CHLORIDE 3 MILLILITER(S): 9 INJECTION INTRAMUSCULAR; INTRAVENOUS; SUBCUTANEOUS at 16:08

## 2023-03-04 RX ADMIN — Medication 25 MILLIGRAM(S): at 06:07

## 2023-03-04 RX ADMIN — ENOXAPARIN SODIUM 40 MILLIGRAM(S): 100 INJECTION SUBCUTANEOUS at 22:11

## 2023-03-04 RX ADMIN — DORZOLAMIDE HYDROCHLORIDE 1 DROP(S): 20 SOLUTION/ DROPS OPHTHALMIC at 23:33

## 2023-03-04 RX ADMIN — Medication 25 MILLIGRAM(S): at 17:51

## 2023-03-04 RX ADMIN — SODIUM CHLORIDE 3 MILLILITER(S): 9 INJECTION INTRAMUSCULAR; INTRAVENOUS; SUBCUTANEOUS at 06:46

## 2023-03-04 RX ADMIN — DORZOLAMIDE HYDROCHLORIDE 1 DROP(S): 20 SOLUTION/ DROPS OPHTHALMIC at 09:25

## 2023-03-04 RX ADMIN — TAMSULOSIN HYDROCHLORIDE 0.4 MILLIGRAM(S): 0.4 CAPSULE ORAL at 22:15

## 2023-03-04 NOTE — PROGRESS NOTE ADULT - ASSESSMENT
71 yo Man with PMHx of HTN, HLD, DMT2 (last A1C 6.2) presents for cardiac cath. pt reports he has been feeling intermittent SOB and dizziness, evaluated by Dr. Vieira and had abnormal cardiac CTA after having abnormal TTE and exercise stress test. He denies chest pain, palpitation or SOB currently.     Pt now s/p cardiac cath on 3/2/23 revealing triple vessel disease (mLAD 90%, distal Cx 80%, mRCA 80%). Ct surgery consulted for CABG eval with Dr. Torres.  likely plan for OR either Tuesday or Thursday.    3/3 VSS overnight. HR/BP stable on Lopressor 12.5mg BID. No CP/SOB.  Pending TTE today.  P2y12 187 this AM.  Repeat TSH 5 however remainder of Thyroid panel normal.  UA and MRSA PCR ordered   3/4 VSS no chest pain OR tuesday. Continue preop work up

## 2023-03-04 NOTE — PROGRESS NOTE ADULT - SUBJECTIVE AND OBJECTIVE BOX
VITAL SIGNS    Telemetry:    Vital Signs Last 24 Hrs  T(C): 36.6 (03-04-23 @ 12:23), Max: 36.7 (03-03-23 @ 19:58)  T(F): 97.9 (03-04-23 @ 12:23), Max: 98.1 (03-03-23 @ 19:58)  HR: 67 (03-04-23 @ 12:23) (63 - 77)  BP: 180/79 (03-04-23 @ 12:23) (131/69 - 180/79)  RR: 18 (03-04-23 @ 12:23) (16 - 18)  SpO2: 96% (03-04-23 @ 12:23) (96% - 97%)            03-03 @ 07:01  -  03-04 @ 07:00  --------------------------------------------------------  IN: 840 mL / OUT: 950 mL / NET: -110 mL    03-04 @ 07:01  -  03-04 @ 13:33  --------------------------------------------------------  IN: 365 mL / OUT: 0 mL / NET: 365 mL       Daily     Daily   Admit Wt: Drug Dosing Weight  Height (cm): 177.8 (02 Mar 2023 09:32)  Weight (kg): 93 (02 Mar 2023 09:32)  BMI (kg/m2): 29.4 (02 Mar 2023 09:32)  BSA (m2): 2.11 (02 Mar 2023 09:32)     Daily     LABS  03-03    136  |  103  |  22  ----------------------------<  116<H>  4.1   |  20<L>  |  1.13    Ca    8.7      03 Mar 2023 05:59    TPro  6.9  /  Alb  4.2  /  TBili  0.2  /  DBili  x   /  AST  22  /  ALT  26  /  AlkPhos  92  03-03                                 13.4   4.71  )-----------( 186      ( 03 Mar 2023 05:57 )             39.2          PT/INR - ( 03 Mar 2023 05:59 )   PT: 11.1 sec;   INR: 0.97 ratio         PTT - ( 03 Mar 2023 05:59 )  PTT:31.7 sec          CAPILLARY BLOOD GLUCOSE      POCT Blood Glucose.: 87 mg/dL (04 Mar 2023 11:46)  POCT Blood Glucose.: 121 mg/dL (04 Mar 2023 08:40)  POCT Blood Glucose.: 116 mg/dL (03 Mar 2023 21:46)  POCT Blood Glucose.: 121 mg/dL (03 Mar 2023 17:08)          Drains:     MS       [  ]   [  ]            L Pleural [  ]            R Pleural  [  ]            SHRADDHA  [  ]           Wong  [  ]    Pacing Wires      [  ]   Settings:                                  Isolated  [  ]                    CXR:      MEDICATIONS  aspirin enteric coated 325 milliGRAM(s) Oral daily  atorvastatin 10 milliGRAM(s) Oral at bedtime  dextrose 5%. 1000 milliLiter(s) IV Continuous <Continuous>  dextrose 5%. 1000 milliLiter(s) IV Continuous <Continuous>  dextrose 50% Injectable 25 Gram(s) IV Push once  dextrose 50% Injectable 12.5 Gram(s) IV Push once  dextrose 50% Injectable 25 Gram(s) IV Push once  dextrose Oral Gel 15 Gram(s) Oral once PRN  dorzolamide 2% Ophthalmic Solution 1 Drop(s) Both EYES <User Schedule>  enoxaparin Injectable 40 milliGRAM(s) SubCutaneous every 24 hours  glucagon  Injectable 1 milliGRAM(s) IntraMuscular once  insulin lispro (ADMELOG) corrective regimen sliding scale   SubCutaneous three times a day before meals  insulin lispro (ADMELOG) corrective regimen sliding scale   SubCutaneous at bedtime  metoprolol tartrate 25 milliGRAM(s) Oral every 12 hours  montelukast 10 milliGRAM(s) Oral daily  sodium chloride 0.9% lock flush 3 milliLiter(s) IV Push every 8 hours  sodium chloride 0.9%. 1000 milliLiter(s) IV Continuous <Continuous>  sodium chloride 0.9%. 1000 milliLiter(s) IV Continuous <Continuous>  tamsulosin 0.4 milliGRAM(s) Oral at bedtime      PHYSICAL EXAM      Neurology: alert and oriented x 3, nonfocal, no gross deficits  CV :S1S2  Sternal Wound :  CDI , Stable  Lungs: cta  Abdomen: soft, nontender, nondistended, positive bowel sounds, last bowel movement   :   voids    Extremities:   warm to touch               PAST MEDICAL & SURGICAL HISTORY:  Hip pain, right      Hypertension      Glaucoma      Retinal detachments and defects  bilateral      Macular degeneration of both eyes      DM (diabetes mellitus)      S/P hip replacement, left  2006      Recent retinal detachment, total, bilateral  1989 (left), 2006 (right)      S/P appendectomy      H/O total hip arthroplasty, right  Right hip replacement 2010                     Discussed with Cardiothoracic Team at AM rounds.

## 2023-03-05 LAB
ALBUMIN SERPL ELPH-MCNC: 4.5 G/DL — SIGNIFICANT CHANGE UP (ref 3.3–5)
ALP SERPL-CCNC: 92 U/L — SIGNIFICANT CHANGE UP (ref 40–120)
ALT FLD-CCNC: 31 U/L — SIGNIFICANT CHANGE UP (ref 10–45)
ANION GAP SERPL CALC-SCNC: 12 MMOL/L — SIGNIFICANT CHANGE UP (ref 5–17)
AST SERPL-CCNC: 25 U/L — SIGNIFICANT CHANGE UP (ref 10–40)
BILIRUB SERPL-MCNC: 0.3 MG/DL — SIGNIFICANT CHANGE UP (ref 0.2–1.2)
BUN SERPL-MCNC: 18 MG/DL — SIGNIFICANT CHANGE UP (ref 7–23)
CALCIUM SERPL-MCNC: 9.3 MG/DL — SIGNIFICANT CHANGE UP (ref 8.4–10.5)
CHLORIDE SERPL-SCNC: 106 MMOL/L — SIGNIFICANT CHANGE UP (ref 96–108)
CO2 SERPL-SCNC: 22 MMOL/L — SIGNIFICANT CHANGE UP (ref 22–31)
CREAT SERPL-MCNC: 0.97 MG/DL — SIGNIFICANT CHANGE UP (ref 0.5–1.3)
EGFR: 82 ML/MIN/1.73M2 — SIGNIFICANT CHANGE UP
GLUCOSE BLDC GLUCOMTR-MCNC: 100 MG/DL — HIGH (ref 70–99)
GLUCOSE BLDC GLUCOMTR-MCNC: 138 MG/DL — HIGH (ref 70–99)
GLUCOSE BLDC GLUCOMTR-MCNC: 139 MG/DL — HIGH (ref 70–99)
GLUCOSE BLDC GLUCOMTR-MCNC: 141 MG/DL — HIGH (ref 70–99)
GLUCOSE SERPL-MCNC: 107 MG/DL — HIGH (ref 70–99)
HCT VFR BLD CALC: 43.4 % — SIGNIFICANT CHANGE UP (ref 39–50)
HGB BLD-MCNC: 14.4 G/DL — SIGNIFICANT CHANGE UP (ref 13–17)
MCHC RBC-ENTMCNC: 29.9 PG — SIGNIFICANT CHANGE UP (ref 27–34)
MCHC RBC-ENTMCNC: 33.2 GM/DL — SIGNIFICANT CHANGE UP (ref 32–36)
MCV RBC AUTO: 90.2 FL — SIGNIFICANT CHANGE UP (ref 80–100)
NRBC # BLD: 0 /100 WBCS — SIGNIFICANT CHANGE UP (ref 0–0)
PLATELET # BLD AUTO: 195 K/UL — SIGNIFICANT CHANGE UP (ref 150–400)
POTASSIUM SERPL-MCNC: 4.3 MMOL/L — SIGNIFICANT CHANGE UP (ref 3.5–5.3)
POTASSIUM SERPL-SCNC: 4.3 MMOL/L — SIGNIFICANT CHANGE UP (ref 3.5–5.3)
PROT SERPL-MCNC: 7.1 G/DL — SIGNIFICANT CHANGE UP (ref 6–8.3)
RBC # BLD: 4.81 M/UL — SIGNIFICANT CHANGE UP (ref 4.2–5.8)
RBC # FLD: 12.5 % — SIGNIFICANT CHANGE UP (ref 10.3–14.5)
SODIUM SERPL-SCNC: 140 MMOL/L — SIGNIFICANT CHANGE UP (ref 135–145)
T3 SERPL-MCNC: 102 NG/DL — SIGNIFICANT CHANGE UP (ref 80–200)
T4 AB SER-ACNC: 6.2 UG/DL — SIGNIFICANT CHANGE UP (ref 4.6–12)
TSH SERPL-MCNC: 6.63 UIU/ML — HIGH (ref 0.27–4.2)
WBC # BLD: 5.28 K/UL — SIGNIFICANT CHANGE UP (ref 3.8–10.5)
WBC # FLD AUTO: 5.28 K/UL — SIGNIFICANT CHANGE UP (ref 3.8–10.5)

## 2023-03-05 PROCEDURE — 99231 SBSQ HOSP IP/OBS SF/LOW 25: CPT | Mod: FS

## 2023-03-05 RX ADMIN — Medication 325 MILLIGRAM(S): at 11:39

## 2023-03-05 RX ADMIN — ATORVASTATIN CALCIUM 10 MILLIGRAM(S): 80 TABLET, FILM COATED ORAL at 21:20

## 2023-03-05 RX ADMIN — ENOXAPARIN SODIUM 40 MILLIGRAM(S): 100 INJECTION SUBCUTANEOUS at 21:20

## 2023-03-05 RX ADMIN — DORZOLAMIDE HYDROCHLORIDE 1 DROP(S): 20 SOLUTION/ DROPS OPHTHALMIC at 11:39

## 2023-03-05 RX ADMIN — Medication 25 MILLIGRAM(S): at 17:06

## 2023-03-05 RX ADMIN — TAMSULOSIN HYDROCHLORIDE 0.4 MILLIGRAM(S): 0.4 CAPSULE ORAL at 21:20

## 2023-03-05 RX ADMIN — SODIUM CHLORIDE 3 MILLILITER(S): 9 INJECTION INTRAMUSCULAR; INTRAVENOUS; SUBCUTANEOUS at 11:07

## 2023-03-05 RX ADMIN — Medication 25 MILLIGRAM(S): at 06:34

## 2023-03-05 RX ADMIN — SODIUM CHLORIDE 3 MILLILITER(S): 9 INJECTION INTRAMUSCULAR; INTRAVENOUS; SUBCUTANEOUS at 08:21

## 2023-03-05 RX ADMIN — MONTELUKAST 10 MILLIGRAM(S): 4 TABLET, CHEWABLE ORAL at 11:39

## 2023-03-05 RX ADMIN — SODIUM CHLORIDE 3 MILLILITER(S): 9 INJECTION INTRAMUSCULAR; INTRAVENOUS; SUBCUTANEOUS at 21:37

## 2023-03-05 RX ADMIN — DORZOLAMIDE HYDROCHLORIDE 1 DROP(S): 20 SOLUTION/ DROPS OPHTHALMIC at 21:21

## 2023-03-05 NOTE — PROGRESS NOTE ADULT - SUBJECTIVE AND OBJECTIVE BOX
Subjective " hello"    VITAL SIGNS    Telemetry: NSR 60-80     Vital Signs Last 24 Hrs  T(C): 36.7 (03-05-23 @ 13:05), Max: 36.8 (03-05-23 @ 12:00)  T(F): 98 (03-05-23 @ 13:05), Max: 98.3 (03-05-23 @ 12:00)  HR: 71 (03-05-23 @ 13:05) (62 - 83)  BP: 154/87 (03-05-23 @ 13:05) (149/75 - 173/79)  RR: 16 (03-05-23 @ 13:05) (16 - 18)  SpO2: 97% (03-05-23 @ 13:05) (95% - 97%)             03-04 @ 07:01  -  03-05 @ 07:00  --------------------------------------------------------  IN: 985 mL / OUT: 800 mL / NET: 185 mL    03-05 @ 07:01  -  03-05 @ 13:55  --------------------------------------------------------  IN: 265 mL / OUT: 0 mL / NET: 265 mL                            14.4   5.28  )-----------( 195      ( 05 Mar 2023 07:12 )             43.4       03-05    140  |  106  |  18  ----------------------------<  107<H>  4.3   |  22  |  0.97    Ca    9.3      05 Mar 2023 07:11    TPro  7.1  /  Alb  4.5  /  TBili  0.3  /  DBili  x   /  AST  25  /  ALT  31  /  AlkPhos  92  03-05    MEDICATIONS  (STANDING):  aspirin enteric coated 325 milliGRAM(s) Oral daily  atorvastatin 10 milliGRAM(s) Oral at bedtimee  dorzolamide 2% Ophthalmic Solution 1 Drop(s) Both EYES <User Schedule>  enoxaparin Injectable 40 milliGRAM(s) SubCutaneous every 24 hours  glucagon  Injectable 1 milliGRAM(s) IntraMuscular once  insulin lispro (ADMELOG) corrective regimen sliding scale   SubCutaneous three times a day before meals  insulin lispro (ADMELOG) corrective regimen sliding scale   SubCutaneous at bedtime  metoprolol tartrate 25 milliGRAM(s) Oral every 12 hours  montelukast 10 milliGRAM(s) Oral daily  sodium chloride 0.9% lock flush 3 milliLiter(s) IV Push every 8 hours  sodium chloride 0.9%. 1000 milliLiter(s) (250 mL/Hr) IV Continuous <Continuous>  sodium chloride 0.9%. 1000 milliLiter(s) (75 mL/Hr) IV Continuous <Continuous>  tamsulosin 0.4 milliGRAM(s) Oral at bedtime    MEDICATIONS  (PRN):  dextrose Oral Gel 15 Gram(s) Oral once PRN Blood Glucose LESS THAN 70 milliGRAM(s)/deciliter            Bilirubin Total, Serum: 0.3 mg/dL (03-05 @ 07:11)    CAPILLARY BLOOD GLUCOSE      POCT Blood Glucose.: 138 mg/dL (05 Mar 2023 11:28)  POCT Blood Glucose.: 100 mg/dL (05 Mar 2023 08:10)  POCT Blood Glucose.: 117 mg/dL (04 Mar 2023 21:19)  POCT Blood Glucose.: 102 mg/dL (04 Mar 2023 16:59)                                      PHYSICAL EXAM        Neurology: alert and oriented x 3, nonfocal, no gross deficits    CV :[id number]    Lungs: B/l cta     Abdomen: soft, nontender, nondistended, positive bowel sounds, + BM    :  Voids              Extremities:   Equal strength throughout    b/lle + DP no edema no calftenderness                                           Discussed with Cardiothoracic Team at AM rounds.

## 2023-03-05 NOTE — PROGRESS NOTE ADULT - ASSESSMENT
71 yo Man with PMHx of HTN, HLD, DMT2 (last A1C 6.2) presents for cardiac cath. pt reports he has been feeling intermittent SOB and dizziness, evaluated by Dr. Vieira and had abnormal cardiac CTA after having abnormal TTE and exercise stress test. He denies chest pain, palpitation or SOB currently.     Pt now s/p cardiac cath on 3/2/23 revealing triple vessel disease (mLAD 90%, distal Cx 80%, mRCA 80%). Ct surgery consulted for CABG eval with Dr. Torres.  likely plan for OR either Tuesday or Thursday.    3/3 VSS overnight. HR/BP stable on Lopressor 12.5mg BID. No CP/SOB.  Pending TTE today.  P2y12 187 this AM.  Repeat TSH 5 however remainder of Thyroid panel normal.  UA and MRSA PCR ordered   3/4 VSS no chest pain OR tuesday. Continue preop work up  3/5 VSS CP free OR Tuesday

## 2023-03-06 ENCOUNTER — TRANSCRIPTION ENCOUNTER (OUTPATIENT)
Age: 73
End: 2023-03-06

## 2023-03-06 PROBLEM — E11.9 TYPE 2 DIABETES MELLITUS WITHOUT COMPLICATIONS: Chronic | Status: ACTIVE | Noted: 2023-03-02

## 2023-03-06 LAB
ALBUMIN SERPL ELPH-MCNC: 4.8 G/DL — SIGNIFICANT CHANGE UP (ref 3.3–5)
ALP SERPL-CCNC: 100 U/L — SIGNIFICANT CHANGE UP (ref 40–120)
ALT FLD-CCNC: 34 U/L — SIGNIFICANT CHANGE UP (ref 10–45)
ANION GAP SERPL CALC-SCNC: 11 MMOL/L — SIGNIFICANT CHANGE UP (ref 5–17)
APTT BLD: 29.4 SEC — SIGNIFICANT CHANGE UP (ref 27.5–35.5)
AST SERPL-CCNC: 24 U/L — SIGNIFICANT CHANGE UP (ref 10–40)
BILIRUB SERPL-MCNC: 0.3 MG/DL — SIGNIFICANT CHANGE UP (ref 0.2–1.2)
BLD GP AB SCN SERPL QL: NEGATIVE — SIGNIFICANT CHANGE UP
BUN SERPL-MCNC: 20 MG/DL — SIGNIFICANT CHANGE UP (ref 7–23)
CALCIUM SERPL-MCNC: 9.4 MG/DL — SIGNIFICANT CHANGE UP (ref 8.4–10.5)
CHLORIDE SERPL-SCNC: 104 MMOL/L — SIGNIFICANT CHANGE UP (ref 96–108)
CO2 SERPL-SCNC: 23 MMOL/L — SIGNIFICANT CHANGE UP (ref 22–31)
CREAT SERPL-MCNC: 1.16 MG/DL — SIGNIFICANT CHANGE UP (ref 0.5–1.3)
EGFR: 66 ML/MIN/1.73M2 — SIGNIFICANT CHANGE UP
FLUAV AG NPH QL: SIGNIFICANT CHANGE UP
FLUBV AG NPH QL: SIGNIFICANT CHANGE UP
GLUCOSE BLDC GLUCOMTR-MCNC: 102 MG/DL — HIGH (ref 70–99)
GLUCOSE BLDC GLUCOMTR-MCNC: 109 MG/DL — HIGH (ref 70–99)
GLUCOSE BLDC GLUCOMTR-MCNC: 115 MG/DL — HIGH (ref 70–99)
GLUCOSE BLDC GLUCOMTR-MCNC: 141 MG/DL — HIGH (ref 70–99)
GLUCOSE SERPL-MCNC: 115 MG/DL — HIGH (ref 70–99)
INR BLD: 1.04 RATIO — SIGNIFICANT CHANGE UP (ref 0.88–1.16)
POTASSIUM SERPL-MCNC: 4.1 MMOL/L — SIGNIFICANT CHANGE UP (ref 3.5–5.3)
POTASSIUM SERPL-SCNC: 4.1 MMOL/L — SIGNIFICANT CHANGE UP (ref 3.5–5.3)
PROT SERPL-MCNC: 7.6 G/DL — SIGNIFICANT CHANGE UP (ref 6–8.3)
PROTHROM AB SERPL-ACNC: 12 SEC — SIGNIFICANT CHANGE UP (ref 10.5–13.4)
RH IG SCN BLD-IMP: POSITIVE — SIGNIFICANT CHANGE UP
RSV RNA NPH QL NAA+NON-PROBE: SIGNIFICANT CHANGE UP
SARS-COV-2 RNA SPEC QL NAA+PROBE: SIGNIFICANT CHANGE UP
SODIUM SERPL-SCNC: 138 MMOL/L — SIGNIFICANT CHANGE UP (ref 135–145)

## 2023-03-06 RX ORDER — CHLORHEXIDINE GLUCONATE 213 G/1000ML
30 SOLUTION TOPICAL ONCE
Refills: 0 | Status: COMPLETED | OUTPATIENT
Start: 2023-03-06 | End: 2023-03-07

## 2023-03-06 RX ORDER — ACETAMINOPHEN 500 MG
1000 TABLET ORAL ONCE
Refills: 0 | Status: COMPLETED | OUTPATIENT
Start: 2023-03-06 | End: 2023-03-07

## 2023-03-06 RX ORDER — CEFUROXIME AXETIL 250 MG
1500 TABLET ORAL ONCE
Refills: 0 | Status: DISCONTINUED | OUTPATIENT
Start: 2023-03-06 | End: 2023-03-07

## 2023-03-06 RX ORDER — GABAPENTIN 400 MG/1
300 CAPSULE ORAL ONCE
Refills: 0 | Status: COMPLETED | OUTPATIENT
Start: 2023-03-06 | End: 2023-03-07

## 2023-03-06 RX ORDER — CHLORHEXIDINE GLUCONATE 213 G/1000ML
1 SOLUTION TOPICAL ONCE
Refills: 0 | Status: COMPLETED | OUTPATIENT
Start: 2023-03-06 | End: 2023-03-06

## 2023-03-06 RX ORDER — ASCORBIC ACID 60 MG
2000 TABLET,CHEWABLE ORAL ONCE
Refills: 0 | Status: COMPLETED | OUTPATIENT
Start: 2023-03-06 | End: 2023-03-06

## 2023-03-06 RX ORDER — MUPIROCIN 20 MG/G
1 OINTMENT TOPICAL
Refills: 0 | Status: DISCONTINUED | OUTPATIENT
Start: 2023-03-06 | End: 2023-03-07

## 2023-03-06 RX ADMIN — DORZOLAMIDE HYDROCHLORIDE 1 DROP(S): 20 SOLUTION/ DROPS OPHTHALMIC at 12:13

## 2023-03-06 RX ADMIN — Medication 2000 MILLIGRAM(S): at 21:08

## 2023-03-06 RX ADMIN — Medication 325 MILLIGRAM(S): at 12:14

## 2023-03-06 RX ADMIN — ATORVASTATIN CALCIUM 10 MILLIGRAM(S): 80 TABLET, FILM COATED ORAL at 21:09

## 2023-03-06 RX ADMIN — SODIUM CHLORIDE 3 MILLILITER(S): 9 INJECTION INTRAMUSCULAR; INTRAVENOUS; SUBCUTANEOUS at 05:25

## 2023-03-06 RX ADMIN — SODIUM CHLORIDE 3 MILLILITER(S): 9 INJECTION INTRAMUSCULAR; INTRAVENOUS; SUBCUTANEOUS at 12:15

## 2023-03-06 RX ADMIN — SODIUM CHLORIDE 3 MILLILITER(S): 9 INJECTION INTRAMUSCULAR; INTRAVENOUS; SUBCUTANEOUS at 21:01

## 2023-03-06 RX ADMIN — MUPIROCIN 1 APPLICATION(S): 20 OINTMENT TOPICAL at 17:08

## 2023-03-06 RX ADMIN — Medication 25 MILLIGRAM(S): at 17:06

## 2023-03-06 RX ADMIN — CHLORHEXIDINE GLUCONATE 1 APPLICATION(S): 213 SOLUTION TOPICAL at 21:01

## 2023-03-06 RX ADMIN — Medication 25 MILLIGRAM(S): at 05:33

## 2023-03-06 RX ADMIN — MONTELUKAST 10 MILLIGRAM(S): 4 TABLET, CHEWABLE ORAL at 12:14

## 2023-03-06 RX ADMIN — TAMSULOSIN HYDROCHLORIDE 0.4 MILLIGRAM(S): 0.4 CAPSULE ORAL at 21:08

## 2023-03-06 NOTE — PROGRESS NOTE ADULT - SUBJECTIVE AND OBJECTIVE BOX
Cardiac Surgery Pre-op Note:    CC: Patient is a 72y old  Male who presents with a chief complaint of CAD (05 Mar 2023 13:51)                                                                                                             Surgeon:  [facility]    Procedure: CABG    Allergies    Coumadin (Other (Mod to Severe); Flushing)    Intolerances        HPI:  73 yo Man with PMHx of HTN, HLD, DMT2 (last A1C 6.2) presents for cardiac cath. pt reports he has been feeling intermittent SOB and dizziness, evaluated by Dr. Vieira and had abnormal cardiac CTA after having abnormal TTE and exercise stress test. He denies chest pain, palpitation or SOB currently.     Card: Dr. Lai Vieira    Cardiac CT 2/22/23  Calcium score 1262  mLAD 70% large focal calcified plaque with possible severe stenosis, OM1 50-69%    TTE on 1/25 EF 55-60%, mild AS  Exercise ST in 1/27: no sig ST abnormalities    < from: TTE Echo Complete w/Doppler (11.17.18 @ 09:08) >   1. Left ventricular ejection fraction, by visual estimation, is 55 to 60%.   2. Normal global left ventricular systolic function.   3. Spectral Doppler shows pseudonormal pattern of left ventricular myocardial filling (Grade II diastolic dysfunction).   4. There is mild septal left ventricular hypertrophy.   5. Moderately enlarged right ventricle.   6. There is no evidence of pericardial effusion.   7. Thickening of the anterior and posterior mitral valve leaflets.   8. Mild tricuspid regurgitation.   9. Sclerotic aortic valve with normal opening.  10. Pulmonic valve regurgitation.  11. Structurally normal pulmonic valve, with normal leaflet excursion.  12. Estimated pulmonary artery systolic pressure is 55.5 mmHg assuming a right atrial pressure of 10 mmHg, which is consistent with moderate pulmonary hypertension.  13. The aortic valve mean gradient is 5.6 mmHg consistent with normally opening aortic valve. < end of copied text >   (02 Mar 2023 08:58)      PAST MEDICAL & SURGICAL HISTORY:  Hip pain, right      Hypertension      Glaucoma      Retinal detachments and defects  bilateral      Macular degeneration of both eyes      DM (diabetes mellitus)      S/P hip replacement, left  2006      Recent retinal detachment, total, bilateral  1989 (left), 2006 (right)      S/P appendectomy      H/O total hip arthroplasty, right  Right hip replacement 2010          MEDICATIONS  (STANDING):  acetaminophen     Tablet .. 1000 milliGRAM(s) Oral once  ascorbic acid 2000 milliGRAM(s) Oral once  aspirin enteric coated 325 milliGRAM(s) Oral daily  atorvastatin 10 milliGRAM(s) Oral at bedtime  cefuroxime  IVPB 1500 milliGRAM(s) IV Intermittent once  chlorhexidine 0.12% Liquid 30 milliLiter(s) Swish and Spit once  chlorhexidine 4% Liquid 1 Application(s) Topical once  dextrose 5%. 1000 milliLiter(s) (50 mL/Hr) IV Continuous <Continuous>  dextrose 5%. 1000 milliLiter(s) (100 mL/Hr) IV Continuous <Continuous>  dextrose 50% Injectable 25 Gram(s) IV Push once  dextrose 50% Injectable 12.5 Gram(s) IV Push once  dextrose 50% Injectable 25 Gram(s) IV Push once  dorzolamide 2% Ophthalmic Solution 1 Drop(s) Both EYES <User Schedule>  gabapentin 300 milliGRAM(s) Oral once  glucagon  Injectable 1 milliGRAM(s) IntraMuscular once  insulin lispro (ADMELOG) corrective regimen sliding scale   SubCutaneous three times a day before meals  insulin lispro (ADMELOG) corrective regimen sliding scale   SubCutaneous at bedtime  metoprolol tartrate 25 milliGRAM(s) Oral every 12 hours  montelukast 10 milliGRAM(s) Oral daily  mupirocin 2% Ointment 1 Application(s) Both Nostrils two times a day  sodium chloride 0.9% lock flush 3 milliLiter(s) IV Push every 8 hours  sodium chloride 0.9%. 1000 milliLiter(s) (250 mL/Hr) IV Continuous <Continuous>  sodium chloride 0.9%. 1000 milliLiter(s) (75 mL/Hr) IV Continuous <Continuous>  tamsulosin 0.4 milliGRAM(s) Oral at bedtime    MEDICATIONS  (PRN):  dextrose Oral Gel 15 Gram(s) Oral once PRN Blood Glucose LESS THAN 70 milliGRAM(s)/deciliter        Labs:                        14.4   5.28  )-----------( 195      ( 05 Mar 2023 07:12 )             43.4     03-06    138  |  104  |  20  ----------------------------<  115<H>  4.1   |  23  |  1.16    Ca    9.4      06 Mar 2023 14:21    TPro  7.6  /  Alb  4.8  /  TBili  0.3  /  DBili  x   /  AST  24  /  ALT  34  /  AlkPhos  100  03-06    PT/INR - ( 06 Mar 2023 14:21 )   PT: 12.0 sec;   INR: 1.04 ratio         PTT - ( 06 Mar 2023 14:21 )  PTT:29.4 sec    Blood Type: ABO Interpretation: O (03-03 @ 05:56)    HGB A1C: A1C with Estimated Average Glucose (03.03.23 @ 05:57)    A1C with Estimated Average Glucose Result: 6.0: Method: Immunoassay      Pro-BNP: Serum Pro-Brain Natriuretic Peptide: <36 pg/mL (03-03 @ 05:59)    Thyroid Panel: 03-05 @ 07:14/6.63  --/6.2/102  03-03 @ 05:59/5.09  1.1/--/--  03-02 @ 19:20/5.50  --/6.1/96    MRSA: MRSA PCR Result.: NotDetec (03-03 @ 14:34)   / MSSA:       CXR:   < from: Xray Chest 1 View AP/PA (03.03.23 @ 09:56) >  IMPRESSION:  Minimal atelectasis as noted.    < end of copied text >    EKG:  < from: 12 Lead ECG (03.02.23 @ 09:11) >   SINUS RHYTHM WITH 1ST DEGREE A-V BLOCK WITH FREQUENT PREMATURE VENTRICULAR COMPLEXES  CANNOT RULE OUT INFERIOR INFARCT , AGE UNDETERMINED  NO PREVIOUS ECGS AVAILABLE    < end of copied text >    Carotid Duplex:    < from: VA Duplex Carotid, Bilat (03.02.23 @ 15:15) >  IMPRESSION: Calcified plaque in the left internal carotid artery creating   a hemodynamically significant stenosis. The degree of luminal narrowing   is estimated at 50-69% by diameter.    Mixed plaque in the left internal carotid artery without duplex evidence   of hemodynamically significant stenosis.    Bilateral mild to moderate external carotid artery stenoses.    < end of copied text >    PFT's:    Echocardiogram:  < from: TTE with Doppler (w/Cont) (03.03.23 @ 12:13) >  Conclusions:  1. Normal mitral valve. Minimal mitral regurgitation.  2. Calcified trileaflet aortic valve with normal opening.  No aortic valve regurgitation seen.  3. Endocardial visualization enhanced with intravenous  injection of Ultrasonic Enhancing Agent (Lumason).  Hyperdynamic left ventricular systolic function.  4. Normal right ventricular size and function.    < end of copied text >    Cardiac catheterization:  LM   Left main artery: Angiography shows mild atherosclerosis. There is a  20 % stenosis.    LAD   Left anterior descending artery: Angiography shows severe  atherosclerosis. There are sequential lesions in the mid to distal  LAD that are >70%. . There is a 75 % stenosis.      CX   Circumflex: Angiography shows severe atherosclerosis. There is a 75%  in the mid LCX and a 75% lesison in the OM2 branch..  There is a 75 % stenosis.      RCA   Right coronary artery: The proximal to mid RCA has sequential 80%  stenoses. The distal RCA is diffusely diseased. . There is  an 80 % stenosis.          Gen: WN/WD NAD  Neuro: AAOx3, nonfocal  Pulm: CTA B/L  CV: RRR, S1S2  Abd: Soft, NT, ND +BS  Ext: No edema, + peripheral pulses      Pt has AICD/PPM [ ] Yes  [ ] No             Brand Name:  Pre-op Beta Blocker ordered within 24 hrs of surgery?  [ ] Yes  [ ] No  If not, Why?  Type & Cross  [ ] Yes  [ ] No  NPO after Midnight [ ] Yes  [ ] No  Pre-op ABX ordered, to be taped on chart:  [ ] Yes  [ ] No     Hibiclens/Peridex ordered [ ] Yes  [ ] No  Intraop on Hold: PRBCs, CXR, TOMEKA [ ]   Consent obtained  [ ] Yes  [ ] No

## 2023-03-07 ENCOUNTER — APPOINTMENT (OUTPATIENT)
Dept: CARDIOTHORACIC SURGERY | Facility: HOSPITAL | Age: 73
End: 2023-03-07

## 2023-03-07 LAB
ALBUMIN SERPL ELPH-MCNC: 3.9 G/DL — SIGNIFICANT CHANGE UP (ref 3.3–5)
ALP SERPL-CCNC: 62 U/L — SIGNIFICANT CHANGE UP (ref 40–120)
ALT FLD-CCNC: 23 U/L — SIGNIFICANT CHANGE UP (ref 10–45)
ANION GAP SERPL CALC-SCNC: 13 MMOL/L — SIGNIFICANT CHANGE UP (ref 5–17)
APTT BLD: 27 SEC — LOW (ref 27.5–35.5)
APTT BLD: 28.2 SEC — SIGNIFICANT CHANGE UP (ref 27.5–35.5)
AST SERPL-CCNC: 31 U/L — SIGNIFICANT CHANGE UP (ref 10–40)
BASE EXCESS BLDV CALC-SCNC: -0.6 MMOL/L — SIGNIFICANT CHANGE UP (ref -2–3)
BASE EXCESS BLDV CALC-SCNC: -1.6 MMOL/L — SIGNIFICANT CHANGE UP (ref -2–3)
BASE EXCESS BLDV CALC-SCNC: -1.8 MMOL/L — SIGNIFICANT CHANGE UP (ref -2–3)
BASE EXCESS BLDV CALC-SCNC: -1.9 MMOL/L — SIGNIFICANT CHANGE UP (ref -2–3)
BASE EXCESS BLDV CALC-SCNC: -2.6 MMOL/L — LOW (ref -2–3)
BASE EXCESS BLDV CALC-SCNC: -4.1 MMOL/L — LOW (ref -2–3)
BASOPHILS # BLD AUTO: 0.01 K/UL — SIGNIFICANT CHANGE UP (ref 0–0.2)
BASOPHILS # BLD AUTO: 0.04 K/UL — SIGNIFICANT CHANGE UP (ref 0–0.2)
BASOPHILS NFR BLD AUTO: 0.1 % — SIGNIFICANT CHANGE UP (ref 0–2)
BASOPHILS NFR BLD AUTO: 0.3 % — SIGNIFICANT CHANGE UP (ref 0–2)
BILIRUB SERPL-MCNC: 0.5 MG/DL — SIGNIFICANT CHANGE UP (ref 0.2–1.2)
BLOOD GAS VENOUS - CREATININE: SIGNIFICANT CHANGE UP MG/DL (ref 0.5–1.3)
BUN SERPL-MCNC: 15 MG/DL — SIGNIFICANT CHANGE UP (ref 7–23)
CA-I SERPL-SCNC: 0.82 MMOL/L — LOW (ref 1.15–1.33)
CA-I SERPL-SCNC: 0.95 MMOL/L — LOW (ref 1.15–1.33)
CA-I SERPL-SCNC: 1.04 MMOL/L — LOW (ref 1.15–1.33)
CA-I SERPL-SCNC: 1.11 MMOL/L — LOW (ref 1.15–1.33)
CA-I SERPL-SCNC: 1.18 MMOL/L — SIGNIFICANT CHANGE UP (ref 1.15–1.33)
CA-I SERPL-SCNC: 1.27 MMOL/L — SIGNIFICANT CHANGE UP (ref 1.15–1.33)
CALCIUM SERPL-MCNC: 8.5 MG/DL — SIGNIFICANT CHANGE UP (ref 8.4–10.5)
CHLORIDE BLDV-SCNC: 102 MMOL/L — SIGNIFICANT CHANGE UP (ref 96–108)
CHLORIDE BLDV-SCNC: 105 MMOL/L — SIGNIFICANT CHANGE UP (ref 96–108)
CHLORIDE BLDV-SCNC: 108 MMOL/L — SIGNIFICANT CHANGE UP (ref 96–108)
CHLORIDE BLDV-SCNC: 110 MMOL/L — HIGH (ref 96–108)
CHLORIDE SERPL-SCNC: 108 MMOL/L — SIGNIFICANT CHANGE UP (ref 96–108)
CK MB BLD-MCNC: 9.9 % — HIGH (ref 0–3.5)
CK MB CFR SERPL CALC: 29 NG/ML — HIGH (ref 0–6.7)
CK SERPL-CCNC: 293 U/L — HIGH (ref 30–200)
CO2 BLDV-SCNC: 24 MMOL/L — SIGNIFICANT CHANGE UP (ref 22–26)
CO2 BLDV-SCNC: 25 MMOL/L — SIGNIFICANT CHANGE UP (ref 22–26)
CO2 BLDV-SCNC: 26 MMOL/L — SIGNIFICANT CHANGE UP (ref 22–26)
CO2 BLDV-SCNC: 26 MMOL/L — SIGNIFICANT CHANGE UP (ref 22–26)
CO2 BLDV-SCNC: 27 MMOL/L — HIGH (ref 22–26)
CO2 BLDV-SCNC: 28 MMOL/L — HIGH (ref 22–26)
CO2 SERPL-SCNC: 21 MMOL/L — LOW (ref 22–31)
CREAT SERPL-MCNC: 0.8 MG/DL — SIGNIFICANT CHANGE UP (ref 0.5–1.3)
EGFR: 93 ML/MIN/1.73M2 — SIGNIFICANT CHANGE UP
EOSINOPHIL # BLD AUTO: 0 K/UL — SIGNIFICANT CHANGE UP (ref 0–0.5)
EOSINOPHIL # BLD AUTO: 0.04 K/UL — SIGNIFICANT CHANGE UP (ref 0–0.5)
EOSINOPHIL NFR BLD AUTO: 0 % — SIGNIFICANT CHANGE UP (ref 0–6)
EOSINOPHIL NFR BLD AUTO: 0.3 % — SIGNIFICANT CHANGE UP (ref 0–6)
FIBRINOGEN PPP-MCNC: 181 MG/DL — LOW (ref 200–445)
FIBRINOGEN PPP-MCNC: 199 MG/DL — LOW (ref 200–445)
GAS PNL BLDA: SIGNIFICANT CHANGE UP
GAS PNL BLDV: 134 MMOL/L — LOW (ref 136–145)
GAS PNL BLDV: 136 MMOL/L — SIGNIFICANT CHANGE UP (ref 136–145)
GAS PNL BLDV: 138 MMOL/L — SIGNIFICANT CHANGE UP (ref 136–145)
GAS PNL BLDV: 139 MMOL/L — SIGNIFICANT CHANGE UP (ref 136–145)
GAS PNL BLDV: 139 MMOL/L — SIGNIFICANT CHANGE UP (ref 136–145)
GAS PNL BLDV: 140 MMOL/L — SIGNIFICANT CHANGE UP (ref 136–145)
GAS PNL BLDV: SIGNIFICANT CHANGE UP
GLUCOSE BLDC GLUCOMTR-MCNC: 104 MG/DL — HIGH (ref 70–99)
GLUCOSE BLDC GLUCOMTR-MCNC: 105 MG/DL — HIGH (ref 70–99)
GLUCOSE BLDC GLUCOMTR-MCNC: 114 MG/DL — HIGH (ref 70–99)
GLUCOSE BLDC GLUCOMTR-MCNC: 122 MG/DL — HIGH (ref 70–99)
GLUCOSE BLDC GLUCOMTR-MCNC: 130 MG/DL — HIGH (ref 70–99)
GLUCOSE BLDC GLUCOMTR-MCNC: 132 MG/DL — HIGH (ref 70–99)
GLUCOSE BLDC GLUCOMTR-MCNC: 134 MG/DL — HIGH (ref 70–99)
GLUCOSE BLDC GLUCOMTR-MCNC: 137 MG/DL — HIGH (ref 70–99)
GLUCOSE BLDC GLUCOMTR-MCNC: 156 MG/DL — HIGH (ref 70–99)
GLUCOSE BLDC GLUCOMTR-MCNC: 164 MG/DL — HIGH (ref 70–99)
GLUCOSE BLDV-MCNC: 114 MG/DL — HIGH (ref 70–99)
GLUCOSE BLDV-MCNC: 117 MG/DL — HIGH (ref 70–99)
GLUCOSE BLDV-MCNC: 118 MG/DL — HIGH (ref 70–99)
GLUCOSE BLDV-MCNC: 132 MG/DL — HIGH (ref 70–99)
GLUCOSE BLDV-MCNC: 132 MG/DL — HIGH (ref 70–99)
GLUCOSE BLDV-MCNC: 137 MG/DL — HIGH (ref 70–99)
GLUCOSE SERPL-MCNC: 176 MG/DL — HIGH (ref 70–99)
HCO3 BLDV-SCNC: 23 MMOL/L — SIGNIFICANT CHANGE UP (ref 22–29)
HCO3 BLDV-SCNC: 24 MMOL/L — SIGNIFICANT CHANGE UP (ref 22–29)
HCO3 BLDV-SCNC: 25 MMOL/L — SIGNIFICANT CHANGE UP (ref 22–29)
HCO3 BLDV-SCNC: 26 MMOL/L — SIGNIFICANT CHANGE UP (ref 22–29)
HCT VFR BLD CALC: 24.8 % — LOW (ref 39–50)
HCT VFR BLD CALC: 25 % — LOW (ref 39–50)
HCT VFR BLD CALC: 32.7 % — LOW (ref 39–50)
HCT VFR BLDA CALC: 25 % — LOW (ref 39–51)
HCT VFR BLDA CALC: 28 % — LOW (ref 39–51)
HCT VFR BLDA CALC: 29 % — LOW (ref 39–51)
HCT VFR BLDA CALC: 31 % — LOW (ref 39–51)
HCT VFR BLDA CALC: 35 % — LOW (ref 39–51)
HCT VFR BLDA CALC: 42 % — SIGNIFICANT CHANGE UP (ref 39–51)
HEPARINASE TEG R TIME: 11.4 MIN (ref 4.3–8.3)
HEPARINASE TEG R TIME: 7.7 MIN — SIGNIFICANT CHANGE UP (ref 4.3–8.3)
HGB BLD CALC-MCNC: 10.2 G/DL — LOW (ref 12.6–17.4)
HGB BLD CALC-MCNC: 11.5 G/DL — LOW (ref 12.6–17.4)
HGB BLD CALC-MCNC: 13.9 G/DL — SIGNIFICANT CHANGE UP (ref 12.6–17.4)
HGB BLD CALC-MCNC: 8.2 G/DL — LOW (ref 12.6–17.4)
HGB BLD CALC-MCNC: 9.4 G/DL — LOW (ref 12.6–17.4)
HGB BLD CALC-MCNC: 9.7 G/DL — LOW (ref 12.6–17.4)
HGB BLD-MCNC: 11 G/DL — LOW (ref 13–17)
HGB BLD-MCNC: 8.2 G/DL — LOW (ref 13–17)
HGB BLD-MCNC: 8.4 G/DL — LOW (ref 13–17)
HOROWITZ INDEX BLDV+IHG-RTO: 44 — SIGNIFICANT CHANGE UP
IMM GRANULOCYTES NFR BLD AUTO: 0.7 % — SIGNIFICANT CHANGE UP (ref 0–0.9)
IMM GRANULOCYTES NFR BLD AUTO: 1.2 % — HIGH (ref 0–0.9)
INR BLD: 1.16 RATIO — SIGNIFICANT CHANGE UP (ref 0.88–1.16)
INR BLD: 1.17 RATIO — HIGH (ref 0.88–1.16)
LACTATE BLDV-MCNC: 1.4 MMOL/L — SIGNIFICANT CHANGE UP (ref 0.5–2)
LACTATE BLDV-MCNC: 1.5 MMOL/L — SIGNIFICANT CHANGE UP (ref 0.5–2)
LACTATE BLDV-MCNC: 1.5 MMOL/L — SIGNIFICANT CHANGE UP (ref 0.5–2)
LACTATE BLDV-MCNC: 1.6 MMOL/L — SIGNIFICANT CHANGE UP (ref 0.5–2)
LACTATE BLDV-MCNC: 3.2 MMOL/L — HIGH (ref 0.5–2)
LACTATE BLDV-MCNC: 3.3 MMOL/L — HIGH (ref 0.5–2)
LYMPHOCYTES # BLD AUTO: 0.81 K/UL — LOW (ref 1–3.3)
LYMPHOCYTES # BLD AUTO: 1.31 K/UL — SIGNIFICANT CHANGE UP (ref 1–3.3)
LYMPHOCYTES # BLD AUTO: 11.4 % — LOW (ref 13–44)
LYMPHOCYTES # BLD AUTO: 9.4 % — LOW (ref 13–44)
MAGNESIUM SERPL-MCNC: 2.7 MG/DL — HIGH (ref 1.6–2.6)
MCHC RBC-ENTMCNC: 30.1 PG — SIGNIFICANT CHANGE UP (ref 27–34)
MCHC RBC-ENTMCNC: 30.2 PG — SIGNIFICANT CHANGE UP (ref 27–34)
MCHC RBC-ENTMCNC: 30.5 PG — SIGNIFICANT CHANGE UP (ref 27–34)
MCHC RBC-ENTMCNC: 33.1 GM/DL — SIGNIFICANT CHANGE UP (ref 32–36)
MCHC RBC-ENTMCNC: 33.6 GM/DL — SIGNIFICANT CHANGE UP (ref 32–36)
MCHC RBC-ENTMCNC: 33.6 GM/DL — SIGNIFICANT CHANGE UP (ref 32–36)
MCV RBC AUTO: 89.8 FL — SIGNIFICANT CHANGE UP (ref 80–100)
MCV RBC AUTO: 90.9 FL — SIGNIFICANT CHANGE UP (ref 80–100)
MCV RBC AUTO: 91.2 FL — SIGNIFICANT CHANGE UP (ref 80–100)
MONOCYTES # BLD AUTO: 0.7 K/UL — SIGNIFICANT CHANGE UP (ref 0–0.9)
MONOCYTES # BLD AUTO: 1.27 K/UL — HIGH (ref 0–0.9)
MONOCYTES NFR BLD AUTO: 14.7 % — HIGH (ref 2–14)
MONOCYTES NFR BLD AUTO: 6.1 % — SIGNIFICANT CHANGE UP (ref 2–14)
NEUTROPHILS # BLD AUTO: 6.49 K/UL — SIGNIFICANT CHANGE UP (ref 1.8–7.4)
NEUTROPHILS # BLD AUTO: 9.24 K/UL — HIGH (ref 1.8–7.4)
NEUTROPHILS NFR BLD AUTO: 75.1 % — SIGNIFICANT CHANGE UP (ref 43–77)
NEUTROPHILS NFR BLD AUTO: 80.7 % — HIGH (ref 43–77)
NRBC # BLD: 0 /100 WBCS — SIGNIFICANT CHANGE UP (ref 0–0)
PCO2 BLDV: 47 MMHG — SIGNIFICANT CHANGE UP (ref 42–55)
PCO2 BLDV: 47 MMHG — SIGNIFICANT CHANGE UP (ref 42–55)
PCO2 BLDV: 49 MMHG — SIGNIFICANT CHANGE UP (ref 42–55)
PCO2 BLDV: 49 MMHG — SIGNIFICANT CHANGE UP (ref 42–55)
PCO2 BLDV: 50 MMHG — SIGNIFICANT CHANGE UP (ref 42–55)
PCO2 BLDV: 55 MMHG — SIGNIFICANT CHANGE UP (ref 42–55)
PH BLDV: 7.27 — LOW (ref 7.32–7.43)
PH BLDV: 7.28 — LOW (ref 7.32–7.43)
PH BLDV: 7.31 — LOW (ref 7.32–7.43)
PH BLDV: 7.34 — SIGNIFICANT CHANGE UP (ref 7.32–7.43)
PHOSPHATE SERPL-MCNC: 3 MG/DL — SIGNIFICANT CHANGE UP (ref 2.5–4.5)
PLATELET # BLD AUTO: 129 K/UL — LOW (ref 150–400)
PLATELET # BLD AUTO: 140 K/UL — LOW (ref 150–400)
PLATELET # BLD AUTO: 185 K/UL — SIGNIFICANT CHANGE UP (ref 150–400)
PO2 BLDV: 122 MMHG — HIGH (ref 25–45)
PO2 BLDV: 32 MMHG — SIGNIFICANT CHANGE UP (ref 25–45)
PO2 BLDV: 33 MMHG — SIGNIFICANT CHANGE UP (ref 25–45)
PO2 BLDV: 61 MMHG — HIGH (ref 25–45)
PO2 BLDV: 68 MMHG — HIGH (ref 25–45)
PO2 BLDV: 87 MMHG — HIGH (ref 25–45)
POTASSIUM BLDV-SCNC: 4.5 MMOL/L — SIGNIFICANT CHANGE UP (ref 3.5–5.1)
POTASSIUM BLDV-SCNC: 4.6 MMOL/L — SIGNIFICANT CHANGE UP (ref 3.5–5.1)
POTASSIUM BLDV-SCNC: 5.2 MMOL/L — HIGH (ref 3.5–5.1)
POTASSIUM BLDV-SCNC: 6.6 MMOL/L — CRITICAL HIGH (ref 3.5–5.1)
POTASSIUM BLDV-SCNC: 6.7 MMOL/L — CRITICAL HIGH (ref 3.5–5.1)
POTASSIUM BLDV-SCNC: 6.8 MMOL/L — CRITICAL HIGH (ref 3.5–5.1)
POTASSIUM SERPL-MCNC: 4.7 MMOL/L — SIGNIFICANT CHANGE UP (ref 3.5–5.3)
POTASSIUM SERPL-SCNC: 4.7 MMOL/L — SIGNIFICANT CHANGE UP (ref 3.5–5.3)
PROT SERPL-MCNC: 5.6 G/DL — LOW (ref 6–8.3)
PROTHROM AB SERPL-ACNC: 13.5 SEC — HIGH (ref 10.5–13.4)
PROTHROM AB SERPL-ACNC: 13.6 SEC — HIGH (ref 10.5–13.4)
RAPIDTEG MAXIMUM AMPLITUDE: 58.1 MM — SIGNIFICANT CHANGE UP (ref 52–70)
RAPIDTEG MAXIMUM AMPLITUDE: 58.3 MM — SIGNIFICANT CHANGE UP (ref 52–70)
RBC # BLD: 2.72 M/UL — LOW (ref 4.2–5.8)
RBC # BLD: 2.75 M/UL — LOW (ref 4.2–5.8)
RBC # BLD: 3.64 M/UL — LOW (ref 4.2–5.8)
RBC # FLD: 12.4 % — SIGNIFICANT CHANGE UP (ref 10.3–14.5)
RBC # FLD: 12.6 % — SIGNIFICANT CHANGE UP (ref 10.3–14.5)
RBC # FLD: 13.1 % — SIGNIFICANT CHANGE UP (ref 10.3–14.5)
SAO2 % BLDV: 59.8 % — LOW (ref 67–88)
SAO2 % BLDV: 62.2 % — LOW (ref 67–88)
SAO2 % BLDV: 88.8 % — HIGH (ref 67–88)
SAO2 % BLDV: 93.9 % — HIGH (ref 67–88)
SAO2 % BLDV: 96 % — HIGH (ref 67–88)
SAO2 % BLDV: 97.4 % — HIGH (ref 67–88)
SODIUM SERPL-SCNC: 142 MMOL/L — SIGNIFICANT CHANGE UP (ref 135–145)
TEG FUNCTIONAL FIBRINOGEN: 18.7 MM — SIGNIFICANT CHANGE UP (ref 15–32)
TEG FUNCTIONAL FIBRINOGEN: 19.2 MM — SIGNIFICANT CHANGE UP (ref 15–32)
TEG MAXIMUM AMPLITUDE: 51.8 MM (ref 52–69)
TEG MAXIMUM AMPLITUDE: 57.9 MM — SIGNIFICANT CHANGE UP (ref 52–69)
TEG REACTION TIME: 13 MIN (ref 4.6–9.1)
TEG REACTION TIME: 8.9 MIN — SIGNIFICANT CHANGE UP (ref 4.6–9.1)
TROPONIN T, HIGH SENSITIVITY RESULT: 279 NG/L — HIGH (ref 0–51)
WBC # BLD: 11.47 K/UL — HIGH (ref 3.8–10.5)
WBC # BLD: 8.64 K/UL — SIGNIFICANT CHANGE UP (ref 3.8–10.5)
WBC # BLD: 9.02 K/UL — SIGNIFICANT CHANGE UP (ref 3.8–10.5)
WBC # FLD AUTO: 11.47 K/UL — HIGH (ref 3.8–10.5)
WBC # FLD AUTO: 8.64 K/UL — SIGNIFICANT CHANGE UP (ref 3.8–10.5)
WBC # FLD AUTO: 9.02 K/UL — SIGNIFICANT CHANGE UP (ref 3.8–10.5)

## 2023-03-07 PROCEDURE — 71045 X-RAY EXAM CHEST 1 VIEW: CPT | Mod: 26

## 2023-03-07 PROCEDURE — 33518 CABG ARTERY-VEIN TWO: CPT

## 2023-03-07 PROCEDURE — 71045 X-RAY EXAM CHEST 1 VIEW: CPT | Mod: 26,77

## 2023-03-07 PROCEDURE — 33533 CABG ARTERIAL SINGLE: CPT

## 2023-03-07 PROCEDURE — 35820 EXPLORE CHEST VESSELS: CPT | Mod: 78

## 2023-03-07 PROCEDURE — 33508 ENDOSCOPIC VEIN HARVEST: CPT | Mod: 59

## 2023-03-07 DEVICE — CHEST DRAIN THORACIC ARGYLE PVC 32FR STRAIGHT: Type: IMPLANTABLE DEVICE | Status: FUNCTIONAL

## 2023-03-07 DEVICE — CANNULA VENOUS 2 STAGE THIN FLEX 36/46FR X 1/2" WITH RETURN DIAL: Type: IMPLANTABLE DEVICE | Status: FUNCTIONAL

## 2023-03-07 DEVICE — PACING WIRE ORANGE M-25 WINGED WIRE 37MM X 89MM: Type: IMPLANTABLE DEVICE | Status: FUNCTIONAL

## 2023-03-07 DEVICE — LIGATING CLIPS WECK HORIZON SMALL-WIDE (RED) 24: Type: IMPLANTABLE DEVICE | Status: FUNCTIONAL

## 2023-03-07 DEVICE — LIGATING CLIPS WECK HORIZON MEDIUM (BLUE) 24: Type: IMPLANTABLE DEVICE | Status: FUNCTIONAL

## 2023-03-07 DEVICE — CATH SILICONE THORACIC ANGLED 33FR: Type: IMPLANTABLE DEVICE | Status: FUNCTIONAL

## 2023-03-07 DEVICE — KIT A-LINE 1LUM 20G X 12CM SAFE KIT: Type: IMPLANTABLE DEVICE | Status: FUNCTIONAL

## 2023-03-07 DEVICE — CANNULA ARTERIAL OPTISITE 22FR X 3/8" VENTED: Type: IMPLANTABLE DEVICE | Status: FUNCTIONAL

## 2023-03-07 DEVICE — CANNULA AORTIC ROOT WITH VENT LINE 9G X 13.3CM FLANGED PRESSURE MONITORING: Type: IMPLANTABLE DEVICE | Status: FUNCTIONAL

## 2023-03-07 DEVICE — CANNULA VESSEL 3MM BLUNT TIP CLEAR 1-WAY VALVE: Type: IMPLANTABLE DEVICE | Status: FUNCTIONAL

## 2023-03-07 DEVICE — KIT CVC 2LUM MAC 9FR CHG: Type: IMPLANTABLE DEVICE | Status: FUNCTIONAL

## 2023-03-07 DEVICE — PACING WIRE WHITE M-24 BAREWIRE 37MM X 89MM: Type: IMPLANTABLE DEVICE | Status: FUNCTIONAL

## 2023-03-07 RX ORDER — SODIUM CHLORIDE 9 MG/ML
1000 INJECTION INTRAMUSCULAR; INTRAVENOUS; SUBCUTANEOUS
Refills: 0 | Status: DISCONTINUED | OUTPATIENT
Start: 2023-03-07 | End: 2023-03-07

## 2023-03-07 RX ORDER — FAMOTIDINE 10 MG/ML
20 INJECTION INTRAVENOUS EVERY 12 HOURS
Refills: 0 | Status: DISCONTINUED | OUTPATIENT
Start: 2023-03-07 | End: 2023-03-08

## 2023-03-07 RX ORDER — DEXTROSE 50 % IN WATER 50 %
50 SYRINGE (ML) INTRAVENOUS
Refills: 0 | Status: DISCONTINUED | OUTPATIENT
Start: 2023-03-07 | End: 2023-03-08

## 2023-03-07 RX ORDER — POLYETHYLENE GLYCOL 3350 17 G/17G
17 POWDER, FOR SOLUTION ORAL DAILY
Refills: 0 | Status: CANCELLED | OUTPATIENT
Start: 2023-03-08 | End: 2023-03-07

## 2023-03-07 RX ORDER — CEFUROXIME AXETIL 250 MG
1500 TABLET ORAL EVERY 8 HOURS
Refills: 0 | Status: COMPLETED | OUTPATIENT
Start: 2023-03-08 | End: 2023-03-09

## 2023-03-07 RX ORDER — ASPIRIN/CALCIUM CARB/MAGNESIUM 324 MG
81 TABLET ORAL DAILY
Refills: 0 | Status: DISCONTINUED | OUTPATIENT
Start: 2023-03-07 | End: 2023-03-07

## 2023-03-07 RX ORDER — MEPERIDINE HYDROCHLORIDE 50 MG/ML
25 INJECTION INTRAMUSCULAR; INTRAVENOUS; SUBCUTANEOUS ONCE
Refills: 0 | Status: DISCONTINUED | OUTPATIENT
Start: 2023-03-07 | End: 2023-03-07

## 2023-03-07 RX ORDER — ASPIRIN/CALCIUM CARB/MAGNESIUM 324 MG
300 TABLET ORAL ONCE
Refills: 0 | Status: DISCONTINUED | OUTPATIENT
Start: 2023-03-07 | End: 2023-03-08

## 2023-03-07 RX ORDER — POTASSIUM CHLORIDE 20 MEQ
10 PACKET (EA) ORAL
Refills: 0 | Status: DISCONTINUED | OUTPATIENT
Start: 2023-03-07 | End: 2023-03-08

## 2023-03-07 RX ORDER — ACETAMINOPHEN 500 MG
650 TABLET ORAL EVERY 6 HOURS
Refills: 0 | Status: DISCONTINUED | OUTPATIENT
Start: 2023-03-07 | End: 2023-03-07

## 2023-03-07 RX ORDER — SODIUM CHLORIDE 9 MG/ML
500 INJECTION, SOLUTION INTRAVENOUS ONCE
Refills: 0 | Status: COMPLETED | OUTPATIENT
Start: 2023-03-07 | End: 2023-03-07

## 2023-03-07 RX ORDER — NOREPINEPHRINE BITARTRATE/D5W 8 MG/250ML
0.02 PLASTIC BAG, INJECTION (ML) INTRAVENOUS
Qty: 8 | Refills: 0 | Status: DISCONTINUED | OUTPATIENT
Start: 2023-03-07 | End: 2023-03-08

## 2023-03-07 RX ORDER — GABAPENTIN 400 MG/1
100 CAPSULE ORAL EVERY 8 HOURS
Refills: 0 | Status: DISCONTINUED | OUTPATIENT
Start: 2023-03-07 | End: 2023-03-07

## 2023-03-07 RX ORDER — POTASSIUM CHLORIDE 20 MEQ
10 PACKET (EA) ORAL
Refills: 0 | Status: DISCONTINUED | OUTPATIENT
Start: 2023-03-07 | End: 2023-03-07

## 2023-03-07 RX ORDER — OXYCODONE HYDROCHLORIDE 5 MG/1
10 TABLET ORAL EVERY 4 HOURS
Refills: 0 | Status: DISCONTINUED | OUTPATIENT
Start: 2023-03-07 | End: 2023-03-14

## 2023-03-07 RX ORDER — SENNA PLUS 8.6 MG/1
2 TABLET ORAL AT BEDTIME
Refills: 0 | Status: DISCONTINUED | OUTPATIENT
Start: 2023-03-08 | End: 2023-03-14

## 2023-03-07 RX ORDER — CHLORHEXIDINE GLUCONATE 213 G/1000ML
1 SOLUTION TOPICAL DAILY
Refills: 0 | Status: COMPLETED | OUTPATIENT
Start: 2023-03-07 | End: 2023-03-12

## 2023-03-07 RX ORDER — ACETAMINOPHEN 500 MG
650 TABLET ORAL EVERY 6 HOURS
Refills: 0 | Status: CANCELLED | OUTPATIENT
Start: 2023-03-10 | End: 2023-03-07

## 2023-03-07 RX ORDER — HYDROMORPHONE HYDROCHLORIDE 2 MG/ML
0.5 INJECTION INTRAMUSCULAR; INTRAVENOUS; SUBCUTANEOUS EVERY 6 HOURS
Refills: 0 | Status: DISCONTINUED | OUTPATIENT
Start: 2023-03-07 | End: 2023-03-07

## 2023-03-07 RX ORDER — CEFUROXIME AXETIL 250 MG
1500 TABLET ORAL EVERY 8 HOURS
Refills: 0 | Status: DISCONTINUED | OUTPATIENT
Start: 2023-03-07 | End: 2023-03-07

## 2023-03-07 RX ORDER — ASCORBIC ACID 60 MG
500 TABLET,CHEWABLE ORAL
Refills: 0 | Status: DISCONTINUED | OUTPATIENT
Start: 2023-03-07 | End: 2023-03-07

## 2023-03-07 RX ORDER — MEPERIDINE HYDROCHLORIDE 50 MG/ML
25 INJECTION INTRAMUSCULAR; INTRAVENOUS; SUBCUTANEOUS ONCE
Refills: 0 | Status: DISCONTINUED | OUTPATIENT
Start: 2023-03-07 | End: 2023-03-08

## 2023-03-07 RX ORDER — DORZOLAMIDE HYDROCHLORIDE 20 MG/ML
1 SOLUTION/ DROPS OPHTHALMIC
Refills: 0 | Status: DISCONTINUED | OUTPATIENT
Start: 2023-03-07 | End: 2023-03-14

## 2023-03-07 RX ORDER — INSULIN HUMAN 100 [IU]/ML
3 INJECTION, SOLUTION SUBCUTANEOUS
Qty: 100 | Refills: 0 | Status: DISCONTINUED | OUTPATIENT
Start: 2023-03-07 | End: 2023-03-07

## 2023-03-07 RX ORDER — CALCIUM GLUCONATE 100 MG/ML
2 VIAL (ML) INTRAVENOUS ONCE
Refills: 0 | Status: COMPLETED | OUTPATIENT
Start: 2023-03-07 | End: 2023-03-07

## 2023-03-07 RX ORDER — FAMOTIDINE 10 MG/ML
20 INJECTION INTRAVENOUS EVERY 12 HOURS
Refills: 0 | Status: DISCONTINUED | OUTPATIENT
Start: 2023-03-07 | End: 2023-03-07

## 2023-03-07 RX ORDER — DEXTROSE 50 % IN WATER 50 %
25 SYRINGE (ML) INTRAVENOUS
Refills: 0 | Status: DISCONTINUED | OUTPATIENT
Start: 2023-03-07 | End: 2023-03-07

## 2023-03-07 RX ORDER — ALBUMIN HUMAN 25 %
250 VIAL (ML) INTRAVENOUS ONCE
Refills: 0 | Status: COMPLETED | OUTPATIENT
Start: 2023-03-07 | End: 2023-03-07

## 2023-03-07 RX ORDER — AMIODARONE HYDROCHLORIDE 400 MG/1
400 TABLET ORAL
Refills: 0 | Status: DISCONTINUED | OUTPATIENT
Start: 2023-03-07 | End: 2023-03-09

## 2023-03-07 RX ORDER — DEXTROSE 50 % IN WATER 50 %
25 SYRINGE (ML) INTRAVENOUS
Refills: 0 | Status: DISCONTINUED | OUTPATIENT
Start: 2023-03-07 | End: 2023-03-08

## 2023-03-07 RX ORDER — NOREPINEPHRINE BITARTRATE/D5W 8 MG/250ML
0.05 PLASTIC BAG, INJECTION (ML) INTRAVENOUS
Qty: 8 | Refills: 0 | Status: DISCONTINUED | OUTPATIENT
Start: 2023-03-07 | End: 2023-03-07

## 2023-03-07 RX ORDER — OXYCODONE HYDROCHLORIDE 5 MG/1
5 TABLET ORAL EVERY 4 HOURS
Refills: 0 | Status: DISCONTINUED | OUTPATIENT
Start: 2023-03-07 | End: 2023-03-07

## 2023-03-07 RX ORDER — ASPIRIN/CALCIUM CARB/MAGNESIUM 324 MG
300 TABLET ORAL ONCE
Refills: 0 | Status: DISCONTINUED | OUTPATIENT
Start: 2023-03-07 | End: 2023-03-07

## 2023-03-07 RX ORDER — DEXTROSE 50 % IN WATER 50 %
50 SYRINGE (ML) INTRAVENOUS
Refills: 0 | Status: DISCONTINUED | OUTPATIENT
Start: 2023-03-07 | End: 2023-03-07

## 2023-03-07 RX ORDER — SODIUM CHLORIDE 9 MG/ML
1000 INJECTION INTRAMUSCULAR; INTRAVENOUS; SUBCUTANEOUS
Refills: 0 | Status: DISCONTINUED | OUTPATIENT
Start: 2023-03-07 | End: 2023-03-14

## 2023-03-07 RX ORDER — ASCORBIC ACID 60 MG
500 TABLET,CHEWABLE ORAL
Refills: 0 | Status: COMPLETED | OUTPATIENT
Start: 2023-03-07 | End: 2023-03-12

## 2023-03-07 RX ORDER — POLYETHYLENE GLYCOL 3350 17 G/17G
17 POWDER, FOR SOLUTION ORAL DAILY
Refills: 0 | Status: DISCONTINUED | OUTPATIENT
Start: 2023-03-08 | End: 2023-03-14

## 2023-03-07 RX ORDER — CHLORHEXIDINE GLUCONATE 213 G/1000ML
1 SOLUTION TOPICAL DAILY
Refills: 0 | Status: DISCONTINUED | OUTPATIENT
Start: 2023-03-07 | End: 2023-03-07

## 2023-03-07 RX ORDER — OXYCODONE HYDROCHLORIDE 5 MG/1
5 TABLET ORAL EVERY 4 HOURS
Refills: 0 | Status: DISCONTINUED | OUTPATIENT
Start: 2023-03-07 | End: 2023-03-14

## 2023-03-07 RX ORDER — OXYCODONE HYDROCHLORIDE 5 MG/1
10 TABLET ORAL EVERY 4 HOURS
Refills: 0 | Status: DISCONTINUED | OUTPATIENT
Start: 2023-03-07 | End: 2023-03-07

## 2023-03-07 RX ORDER — INSULIN HUMAN 100 [IU]/ML
3 INJECTION, SOLUTION SUBCUTANEOUS
Qty: 100 | Refills: 0 | Status: DISCONTINUED | OUTPATIENT
Start: 2023-03-07 | End: 2023-03-08

## 2023-03-07 RX ORDER — CHLORHEXIDINE GLUCONATE 213 G/1000ML
15 SOLUTION TOPICAL EVERY 12 HOURS
Refills: 0 | Status: DISCONTINUED | OUTPATIENT
Start: 2023-03-07 | End: 2023-03-07

## 2023-03-07 RX ORDER — CHLORHEXIDINE GLUCONATE 213 G/1000ML
15 SOLUTION TOPICAL EVERY 12 HOURS
Refills: 0 | Status: DISCONTINUED | OUTPATIENT
Start: 2023-03-07 | End: 2023-03-08

## 2023-03-07 RX ORDER — AMIODARONE HYDROCHLORIDE 400 MG/1
400 TABLET ORAL
Refills: 0 | Status: DISCONTINUED | OUTPATIENT
Start: 2023-03-07 | End: 2023-03-07

## 2023-03-07 RX ORDER — MONTELUKAST 4 MG/1
10 TABLET, CHEWABLE ORAL DAILY
Refills: 0 | Status: DISCONTINUED | OUTPATIENT
Start: 2023-03-07 | End: 2023-03-14

## 2023-03-07 RX ORDER — SENNA PLUS 8.6 MG/1
2 TABLET ORAL AT BEDTIME
Refills: 0 | Status: CANCELLED | OUTPATIENT
Start: 2023-03-08 | End: 2023-03-07

## 2023-03-07 RX ORDER — DEXMEDETOMIDINE HYDROCHLORIDE IN 0.9% SODIUM CHLORIDE 4 UG/ML
0.6 INJECTION INTRAVENOUS
Qty: 200 | Refills: 0 | Status: DISCONTINUED | OUTPATIENT
Start: 2023-03-07 | End: 2023-03-08

## 2023-03-07 RX ORDER — ASPIRIN/CALCIUM CARB/MAGNESIUM 324 MG
81 TABLET ORAL DAILY
Refills: 0 | Status: DISCONTINUED | OUTPATIENT
Start: 2023-03-07 | End: 2023-03-08

## 2023-03-07 RX ORDER — AMINOCAPROIC ACID 500 MG/1
5 TABLET ORAL ONCE
Refills: 0 | Status: COMPLETED | OUTPATIENT
Start: 2023-03-07 | End: 2023-03-07

## 2023-03-07 RX ORDER — ACETAMINOPHEN 500 MG
650 TABLET ORAL EVERY 6 HOURS
Refills: 0 | Status: COMPLETED | OUTPATIENT
Start: 2023-03-07 | End: 2023-03-10

## 2023-03-07 RX ORDER — ATORVASTATIN CALCIUM 80 MG/1
80 TABLET, FILM COATED ORAL AT BEDTIME
Refills: 0 | Status: DISCONTINUED | OUTPATIENT
Start: 2023-03-07 | End: 2023-03-14

## 2023-03-07 RX ORDER — ACETAMINOPHEN 500 MG
650 TABLET ORAL EVERY 6 HOURS
Refills: 0 | Status: DISCONTINUED | OUTPATIENT
Start: 2023-03-10 | End: 2023-03-14

## 2023-03-07 RX ORDER — GABAPENTIN 400 MG/1
100 CAPSULE ORAL EVERY 8 HOURS
Refills: 0 | Status: COMPLETED | OUTPATIENT
Start: 2023-03-07 | End: 2023-03-12

## 2023-03-07 RX ADMIN — MUPIROCIN 1 APPLICATION(S): 20 OINTMENT TOPICAL at 05:45

## 2023-03-07 RX ADMIN — SODIUM CHLORIDE 2000 MILLILITER(S): 9 INJECTION, SOLUTION INTRAVENOUS at 15:10

## 2023-03-07 RX ADMIN — AMINOCAPROIC ACID 250 GRAM(S): 500 TABLET ORAL at 15:32

## 2023-03-07 RX ADMIN — Medication 200 GRAM(S): at 18:17

## 2023-03-07 RX ADMIN — Medication 200 GRAM(S): at 15:30

## 2023-03-07 RX ADMIN — CHLORHEXIDINE GLUCONATE 30 MILLILITER(S): 213 SOLUTION TOPICAL at 06:45

## 2023-03-07 RX ADMIN — FAMOTIDINE 20 MILLIGRAM(S): 10 INJECTION INTRAVENOUS at 19:17

## 2023-03-07 RX ADMIN — SODIUM CHLORIDE 2000 MILLILITER(S): 9 INJECTION, SOLUTION INTRAVENOUS at 17:51

## 2023-03-07 RX ADMIN — SODIUM CHLORIDE 10 MILLILITER(S): 9 INJECTION INTRAMUSCULAR; INTRAVENOUS; SUBCUTANEOUS at 15:11

## 2023-03-07 RX ADMIN — GABAPENTIN 300 MILLIGRAM(S): 400 CAPSULE ORAL at 06:17

## 2023-03-07 RX ADMIN — Medication 25 MILLIGRAM(S): at 05:43

## 2023-03-07 RX ADMIN — Medication 1000 MILLIGRAM(S): at 06:17

## 2023-03-07 RX ADMIN — Medication 125 MILLILITER(S): at 18:22

## 2023-03-07 RX ADMIN — SODIUM CHLORIDE 3 MILLILITER(S): 9 INJECTION INTRAMUSCULAR; INTRAVENOUS; SUBCUTANEOUS at 05:41

## 2023-03-07 RX ADMIN — INSULIN HUMAN 3 UNIT(S)/HR: 100 INJECTION, SOLUTION SUBCUTANEOUS at 15:11

## 2023-03-07 NOTE — PRE-ANESTHESIA EVALUATION ADULT - NSANTHOSAYNRD_GEN_A_CORE
No. MARGAUX screening performed.  STOP BANG Legend: 0-2 = LOW Risk; 3-4 = INTERMEDIATE Risk; 5-8 = HIGH Risk

## 2023-03-07 NOTE — BRIEF OPERATIVE NOTE - NSICDXBRIEFPROCEDURE_GEN_ALL_CORE_FT
PROCEDURES:  CABG, with TOMEKA 07-Mar-2023 13:59:14  Jonathan Arnold  Repeat median sternotomy 07-Mar-2023 22:59:32  Jonathan Arnold  
PROCEDURES:  CABG, with TOMEKA 07-Mar-2023 13:59:14  Jonathan Arnold

## 2023-03-07 NOTE — PROGRESS NOTE ADULT - SUBJECTIVE AND OBJECTIVE BOX
Patient seen and examined at the bedside.    Remained critically ill on continuous ICU monitoring.    OBJECTIVE:  Vital Signs Last 24 Hrs  T(C): 36.5 (07 Mar 2023 16:00), Max: 36.7 (06 Mar 2023 21:20)  T(F): 97.7 (07 Mar 2023 16:00), Max: 98 (06 Mar 2023 21:20)  HR: 87 (07 Mar 2023 18:30) (61 - 87)  BP: 156/88 (07 Mar 2023 05:50) (123/67 - 156/88)  BP(mean): --  RR: 18 (07 Mar 2023 18:30) (11 - 28)  SpO2: 100% (07 Mar 2023 18:30) (95% - 100%)    Parameters below as of 07 Mar 2023 16:00  Patient On (Oxygen Delivery Method): nasal cannula  O2 Flow (L/min): 6      Physical Exam:   General: NAD   Neurology: nonfocal   Eyes: bilateral pupils equal and reactive   ENT/Neck: Neck supple, trachea midline, No JVD   Respiratory: Clear bilaterally   CV: S1S2, no murmurs        [x] Sternal dressing, [x] Mediastinal CT, [x] L Pleural CT        [x] Sinus rhythm [x] Temporary pacing - AAO @80  Abdominal: Soft, NT, ND +BS   Extremities: 1-2+ pedal edema noted, + peripheral pulses   Skin: No Rashes, Hematoma, Ecchymosis                      ============================I/O===========================   I&O's Detail    06 Mar 2023 07:01  -  07 Mar 2023 07:00  --------------------------------------------------------  IN:    Oral Fluid: 480 mL  Total IN: 480 mL    OUT:  Total OUT: 0 mL    Total NET: 480 mL      07 Mar 2023 07:01  -  07 Mar 2023 18:55  --------------------------------------------------------  IN:    Albumin 5%  - 250 mL: 250 mL    Cryoprecipitate: 150 mL    Insulin: 15 mL    IV PiggyBack: 350 mL    Lactated Ringers Bolus: 1300 mL    Norepinephrine: 3.5 mL    Plasma: 300 mL    Platelets - Single Donor: 300 mL    PRBCs (Packed Red Blood Cells): 310 mL    sodium chloride 0.9%: 50 mL  Total IN: 3028.5 mL    OUT:    Chest Tube (mL): 360 mL    Chest Tube (mL): 1020 mL    Indwelling Catheter - Urethral (mL): 885 mL  Total OUT: 2265 mL    Total NET: 763.5 mL  ============================ LABS =========================                        8.2    9.02  )-----------( 129      ( 07 Mar 2023 17:10 )             24.8     03-07    142  |  108  |  15  ----------------------------<  176<H>  4.7   |  21<L>  |  0.80    Ca    8.5      07 Mar 2023 14:12  Phos  3.0     03-07  Mg     2.7     03-07    TPro  5.6<L>  /  Alb  3.9  /  TBili  0.5  /  DBili  x   /  AST  31  /  ALT  23  /  AlkPhos  62  03-07    LIVER FUNCTIONS - ( 07 Mar 2023 14:12 )  Alb: 3.9 g/dL / Pro: 5.6 g/dL / ALK PHOS: 62 U/L / ALT: 23 U/L / AST: 31 U/L / GGT: x           PT/INR - ( 07 Mar 2023 16:51 )   PT: 13.5 sec;   INR: 1.16 ratio         PTT - ( 07 Mar 2023 16:51 )  PTT:28.2 sec  ABG - ( 07 Mar 2023 18:04 )  pH, Arterial: 7.35  pH, Blood: x     /  pCO2: 41    /  pO2: 163   / HCO3: 23    / Base Excess: -2.8  /  SaO2: 98.0      ======================Micro/Rad/Cardio=================  Culture: Reviewed   CXR: Reviewed  Echo: Reviewed  ======================================================  PAST MEDICAL & SURGICAL HISTORY:  Hip pain, right    Hypertension    Glaucoma    Retinal detachments and defects  bilateral    Macular degeneration of both eyes    DM (diabetes mellitus)    S/P hip replacement, left  2006    Recent retinal detachment, total, bilateral  1989 (left), 2006 (right)    S/P appendectomy    H/O total hip arthroplasty, right  Right hip replacement 2010  ======================================================  Assessment:  73 yo Man with PMHx of HTN, HLD, DMT2 (last A1C 6.2) presents for cardiac cath. pt reports he has been feeling intermittent SOB and dizziness, evaluated by Dr. Vieira and had abnormal cardiac CTA after having abnormal TTE and exercise stress test. He denies chest pain, palpitation or SOB currently.     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  Hemodynamic instability   Hypovolemia   Thrombocytopenia   Lactic acidosis   Acute blood loss anemia   ======================================================  Plan:   ***Neuro***  [x] Nonfocal   Post operative neuro assessment     ***Cardiovascular***  Invasive hemodynamic monitoring, assess perfusion indices   SR / CVP 2/ MAP 65/ Hct 24.8%/ Lactate 3.5  [x] Levophed- 0.05mcgs  Amiodarone for afib prophylaxis   Reassessment of hemodynamics post resuscitation   Monitor chest tube outputs   [x]  ASA   Serial EKG and cardiac enzymes     ***Pulmonary***  [x] 6L NC  Encourage incentive spirometry, continue pulse ox monitoring, follow ABGs     ***GI***  [x] NPO   [x] Pepcid    ***Renal***  Continue to monitor I/Os, BUN/Creatinine.   Replete lytes PRN  Wong present    ***ID***  Perioperative coverage with Cefuroxime.    ***Endocrine***  [x]  DM2 : HbA1c 6.0%                - [x] Insulin gtt             - Need tight glycemic control to prevent wound infection.        Patient requires continuous monitoring with:  bedside rhythm monitoring, arterial line, pulse oximetry, ventilator management and monitoring; intermittent blood gas analysis.  Care plan discussed with ICU care team.  patient remain critical; required more than usual post op care; I have spent 40 minutes providing non routine post op care, revaluated multiple times during the day .     Care Plan discussed with the ICU care team. Patient remained critical, at risk for life threatening decompensation.     By signing my name below, I, Trinidad Velasquez, attest that this documentation has been prepared under the direction and in the presence of Meredith Garcia MD.  Electronically signed: Jeff Vazquez, 03-07-23 @ 18:55    I, Jose Harper, personally performed the services described in this documentation. all medical record entries made by the scribe were at my direction and in my presence. I have reviewed the chart and agree that the record reflects my personal performance and is accurate and complete  Electronically signed: Meredith Garcia MD.

## 2023-03-07 NOTE — AIRWAY PLACEMENT NOTE ADULT - DATE /TIME:
Refill request for gabapentin.  Last seen 8/27/19. Pt has upcoming appt on 5/4/22.  Medication T'd for review and signature    Cristina Bo LPN on 11/12/2021 at 12:39 PM      
07-Mar-2023 22:53

## 2023-03-07 NOTE — BRIEF OPERATIVE NOTE - NSICDXBRIEFPOSTOP_GEN_ALL_CORE_FT
POST-OP DIAGNOSIS:  Triple vessel coronary artery disease 07-Mar-2023 13:58:39  Jonathan Arnold  
POST-OP DIAGNOSIS:  Triple vessel coronary artery disease 07-Mar-2023 13:58:39  Jonathan Arnold  Hemothorax, left 07-Mar-2023 23:00:31  Jonathan Arnold

## 2023-03-07 NOTE — BRIEF OPERATIVE NOTE - OPERATION/FINDINGS
Resternotomy with mediastinal exploration for postoperative bleeding.   Evacuation of left hemothorax
Aortic XClamp: 76    |    Total CPB: 92  Procedure:  LIMA-LAD | SVG-AM | SVG-OM    Right greater saphenous vein harvested endoscopically by JUSTIN FORDE

## 2023-03-07 NOTE — BRIEF OPERATIVE NOTE - NSICDXBRIEFPREOP_GEN_ALL_CORE_FT
PRE-OP DIAGNOSIS:  Triple vessel coronary artery disease 07-Mar-2023 13:58:43  Jonathan Arnold  Hemothorax, left 07-Mar-2023 22:59:50  Jonathan Arnold  
PRE-OP DIAGNOSIS:  Triple vessel coronary artery disease 07-Mar-2023 13:58:43  Jonathan Arnold

## 2023-03-08 DIAGNOSIS — Z95.1 PRESENCE OF AORTOCORONARY BYPASS GRAFT: ICD-10-CM

## 2023-03-08 LAB
ALBUMIN SERPL ELPH-MCNC: 3.2 G/DL — LOW (ref 3.3–5)
ALP SERPL-CCNC: 45 U/L — SIGNIFICANT CHANGE UP (ref 40–120)
ALT FLD-CCNC: 19 U/L — SIGNIFICANT CHANGE UP (ref 10–45)
ANION GAP SERPL CALC-SCNC: 10 MMOL/L — SIGNIFICANT CHANGE UP (ref 5–17)
APTT BLD: 26.7 SEC — LOW (ref 27.5–35.5)
AST SERPL-CCNC: 31 U/L — SIGNIFICANT CHANGE UP (ref 10–40)
BILIRUB SERPL-MCNC: 0.3 MG/DL — SIGNIFICANT CHANGE UP (ref 0.2–1.2)
BUN SERPL-MCNC: 15 MG/DL — SIGNIFICANT CHANGE UP (ref 7–23)
CALCIUM SERPL-MCNC: 8.3 MG/DL — LOW (ref 8.4–10.5)
CHLORIDE SERPL-SCNC: 109 MMOL/L — HIGH (ref 96–108)
CO2 SERPL-SCNC: 24 MMOL/L — SIGNIFICANT CHANGE UP (ref 22–31)
CREAT SERPL-MCNC: 0.84 MG/DL — SIGNIFICANT CHANGE UP (ref 0.5–1.3)
EGFR: 92 ML/MIN/1.73M2 — SIGNIFICANT CHANGE UP
FIBRINOGEN PPP-MCNC: 205 MG/DL — SIGNIFICANT CHANGE UP (ref 200–445)
GAS PNL BLDA: SIGNIFICANT CHANGE UP
GLUCOSE BLDC GLUCOMTR-MCNC: 111 MG/DL — HIGH (ref 70–99)
GLUCOSE BLDC GLUCOMTR-MCNC: 120 MG/DL — HIGH (ref 70–99)
GLUCOSE BLDC GLUCOMTR-MCNC: 121 MG/DL — HIGH (ref 70–99)
GLUCOSE BLDC GLUCOMTR-MCNC: 122 MG/DL — HIGH (ref 70–99)
GLUCOSE BLDC GLUCOMTR-MCNC: 122 MG/DL — HIGH (ref 70–99)
GLUCOSE BLDC GLUCOMTR-MCNC: 124 MG/DL — HIGH (ref 70–99)
GLUCOSE BLDC GLUCOMTR-MCNC: 128 MG/DL — HIGH (ref 70–99)
GLUCOSE BLDC GLUCOMTR-MCNC: 129 MG/DL — HIGH (ref 70–99)
GLUCOSE BLDC GLUCOMTR-MCNC: 130 MG/DL — HIGH (ref 70–99)
GLUCOSE BLDC GLUCOMTR-MCNC: 131 MG/DL — HIGH (ref 70–99)
GLUCOSE BLDC GLUCOMTR-MCNC: 133 MG/DL — HIGH (ref 70–99)
GLUCOSE BLDC GLUCOMTR-MCNC: 135 MG/DL — HIGH (ref 70–99)
GLUCOSE BLDC GLUCOMTR-MCNC: 138 MG/DL — HIGH (ref 70–99)
GLUCOSE BLDC GLUCOMTR-MCNC: 156 MG/DL — HIGH (ref 70–99)
GLUCOSE BLDC GLUCOMTR-MCNC: 160 MG/DL — HIGH (ref 70–99)
GLUCOSE BLDC GLUCOMTR-MCNC: 177 MG/DL — HIGH (ref 70–99)
GLUCOSE BLDC GLUCOMTR-MCNC: 185 MG/DL — HIGH (ref 70–99)
GLUCOSE BLDC GLUCOMTR-MCNC: 186 MG/DL — HIGH (ref 70–99)
GLUCOSE BLDC GLUCOMTR-MCNC: 195 MG/DL — HIGH (ref 70–99)
GLUCOSE BLDC GLUCOMTR-MCNC: 99 MG/DL — SIGNIFICANT CHANGE UP (ref 70–99)
GLUCOSE SERPL-MCNC: 105 MG/DL — HIGH (ref 70–99)
INR BLD: 1.13 RATIO — SIGNIFICANT CHANGE UP (ref 0.88–1.16)
MAGNESIUM SERPL-MCNC: 1.9 MG/DL — SIGNIFICANT CHANGE UP (ref 1.6–2.6)
PHOSPHATE SERPL-MCNC: 5.1 MG/DL — HIGH (ref 2.5–4.5)
POTASSIUM SERPL-MCNC: 4.7 MMOL/L — SIGNIFICANT CHANGE UP (ref 3.5–5.3)
POTASSIUM SERPL-SCNC: 4.7 MMOL/L — SIGNIFICANT CHANGE UP (ref 3.5–5.3)
PROT SERPL-MCNC: 4.7 G/DL — LOW (ref 6–8.3)
PROTHROM AB SERPL-ACNC: 13.1 SEC — SIGNIFICANT CHANGE UP (ref 10.5–13.4)
SODIUM SERPL-SCNC: 143 MMOL/L — SIGNIFICANT CHANGE UP (ref 135–145)

## 2023-03-08 PROCEDURE — 99291 CRITICAL CARE FIRST HOUR: CPT

## 2023-03-08 PROCEDURE — 71045 X-RAY EXAM CHEST 1 VIEW: CPT | Mod: 26

## 2023-03-08 RX ORDER — ALBUMIN HUMAN 25 %
250 VIAL (ML) INTRAVENOUS ONCE
Refills: 0 | Status: COMPLETED | OUTPATIENT
Start: 2023-03-08 | End: 2023-03-08

## 2023-03-08 RX ORDER — CALCIUM GLUCONATE 100 MG/ML
2 VIAL (ML) INTRAVENOUS ONCE
Refills: 0 | Status: COMPLETED | OUTPATIENT
Start: 2023-03-08 | End: 2023-03-08

## 2023-03-08 RX ORDER — TAMSULOSIN HYDROCHLORIDE 0.4 MG/1
0.4 CAPSULE ORAL AT BEDTIME
Refills: 0 | Status: DISCONTINUED | OUTPATIENT
Start: 2023-03-08 | End: 2023-03-14

## 2023-03-08 RX ORDER — PANTOPRAZOLE SODIUM 20 MG/1
40 TABLET, DELAYED RELEASE ORAL
Refills: 0 | Status: DISCONTINUED | OUTPATIENT
Start: 2023-03-08 | End: 2023-03-14

## 2023-03-08 RX ORDER — DEXTROSE 50 % IN WATER 50 %
50 SYRINGE (ML) INTRAVENOUS
Refills: 0 | Status: DISCONTINUED | OUTPATIENT
Start: 2023-03-08 | End: 2023-03-09

## 2023-03-08 RX ORDER — POTASSIUM CHLORIDE 20 MEQ
10 PACKET (EA) ORAL
Refills: 0 | Status: COMPLETED | OUTPATIENT
Start: 2023-03-08 | End: 2023-03-08

## 2023-03-08 RX ORDER — INSULIN LISPRO 100/ML
VIAL (ML) SUBCUTANEOUS
Refills: 0 | Status: DISCONTINUED | OUTPATIENT
Start: 2023-03-08 | End: 2023-03-08

## 2023-03-08 RX ORDER — ASPIRIN/CALCIUM CARB/MAGNESIUM 324 MG
81 TABLET ORAL DAILY
Refills: 0 | Status: DISCONTINUED | OUTPATIENT
Start: 2023-03-08 | End: 2023-03-14

## 2023-03-08 RX ORDER — MAGNESIUM SULFATE 500 MG/ML
2 VIAL (ML) INJECTION ONCE
Refills: 0 | Status: COMPLETED | OUTPATIENT
Start: 2023-03-08 | End: 2023-03-08

## 2023-03-08 RX ORDER — ENOXAPARIN SODIUM 100 MG/ML
40 INJECTION SUBCUTANEOUS EVERY 24 HOURS
Refills: 0 | Status: DISCONTINUED | OUTPATIENT
Start: 2023-03-08 | End: 2023-03-14

## 2023-03-08 RX ORDER — INSULIN HUMAN 100 [IU]/ML
2 INJECTION, SOLUTION SUBCUTANEOUS
Qty: 100 | Refills: 0 | Status: DISCONTINUED | OUTPATIENT
Start: 2023-03-08 | End: 2023-03-09

## 2023-03-08 RX ORDER — HYDROMORPHONE HYDROCHLORIDE 2 MG/ML
1 INJECTION INTRAMUSCULAR; INTRAVENOUS; SUBCUTANEOUS ONCE
Refills: 0 | Status: DISCONTINUED | OUTPATIENT
Start: 2023-03-08 | End: 2023-03-08

## 2023-03-08 RX ADMIN — Medication 200 GRAM(S): at 12:22

## 2023-03-08 RX ADMIN — DORZOLAMIDE HYDROCHLORIDE 1 DROP(S): 20 SOLUTION/ DROPS OPHTHALMIC at 10:09

## 2023-03-08 RX ADMIN — Medication 25 GRAM(S): at 01:22

## 2023-03-08 RX ADMIN — ATORVASTATIN CALCIUM 80 MILLIGRAM(S): 80 TABLET, FILM COATED ORAL at 21:19

## 2023-03-08 RX ADMIN — Medication 650 MILLIGRAM(S): at 12:24

## 2023-03-08 RX ADMIN — Medication 125 MILLILITER(S): at 06:18

## 2023-03-08 RX ADMIN — Medication 125 MILLILITER(S): at 01:22

## 2023-03-08 RX ADMIN — Medication 650 MILLIGRAM(S): at 20:50

## 2023-03-08 RX ADMIN — Medication 650 MILLIGRAM(S): at 21:20

## 2023-03-08 RX ADMIN — Medication 500 MILLILITER(S): at 11:10

## 2023-03-08 RX ADMIN — POLYETHYLENE GLYCOL 3350 17 GRAM(S): 17 POWDER, FOR SOLUTION ORAL at 12:23

## 2023-03-08 RX ADMIN — Medication 3.49 MICROGRAM(S)/KG/MIN: at 00:54

## 2023-03-08 RX ADMIN — CHLORHEXIDINE GLUCONATE 15 MILLILITER(S): 213 SOLUTION TOPICAL at 05:01

## 2023-03-08 RX ADMIN — GABAPENTIN 100 MILLIGRAM(S): 400 CAPSULE ORAL at 14:19

## 2023-03-08 RX ADMIN — Medication 100 MILLIGRAM(S): at 14:20

## 2023-03-08 RX ADMIN — DORZOLAMIDE HYDROCHLORIDE 1 DROP(S): 20 SOLUTION/ DROPS OPHTHALMIC at 21:00

## 2023-03-08 RX ADMIN — Medication 650 MILLIGRAM(S): at 17:24

## 2023-03-08 RX ADMIN — Medication 100 MILLIGRAM(S): at 05:01

## 2023-03-08 RX ADMIN — HYDROMORPHONE HYDROCHLORIDE 1 MILLIGRAM(S): 2 INJECTION INTRAMUSCULAR; INTRAVENOUS; SUBCUTANEOUS at 00:30

## 2023-03-08 RX ADMIN — INSULIN HUMAN 3 UNIT(S)/HR: 100 INJECTION, SOLUTION SUBCUTANEOUS at 00:54

## 2023-03-08 RX ADMIN — CHLORHEXIDINE GLUCONATE 1 APPLICATION(S): 213 SOLUTION TOPICAL at 12:19

## 2023-03-08 RX ADMIN — SENNA PLUS 2 TABLET(S): 8.6 TABLET ORAL at 21:20

## 2023-03-08 RX ADMIN — FAMOTIDINE 20 MILLIGRAM(S): 10 INJECTION INTRAVENOUS at 05:01

## 2023-03-08 RX ADMIN — MONTELUKAST 10 MILLIGRAM(S): 4 TABLET, CHEWABLE ORAL at 12:23

## 2023-03-08 RX ADMIN — Medication 500 MILLILITER(S): at 08:45

## 2023-03-08 RX ADMIN — Medication 500 MILLIGRAM(S): at 17:24

## 2023-03-08 RX ADMIN — Medication 125 MILLILITER(S): at 06:00

## 2023-03-08 RX ADMIN — TAMSULOSIN HYDROCHLORIDE 0.4 MILLIGRAM(S): 0.4 CAPSULE ORAL at 21:19

## 2023-03-08 RX ADMIN — ENOXAPARIN SODIUM 40 MILLIGRAM(S): 100 INJECTION SUBCUTANEOUS at 14:20

## 2023-03-08 RX ADMIN — GABAPENTIN 100 MILLIGRAM(S): 400 CAPSULE ORAL at 21:20

## 2023-03-08 RX ADMIN — Medication 81 MILLIGRAM(S): at 14:19

## 2023-03-08 RX ADMIN — HYDROMORPHONE HYDROCHLORIDE 1 MILLIGRAM(S): 2 INJECTION INTRAMUSCULAR; INTRAVENOUS; SUBCUTANEOUS at 00:15

## 2023-03-08 RX ADMIN — AMIODARONE HYDROCHLORIDE 400 MILLIGRAM(S): 400 TABLET ORAL at 17:24

## 2023-03-08 RX ADMIN — Medication 650 MILLIGRAM(S): at 13:00

## 2023-03-08 RX ADMIN — OXYCODONE HYDROCHLORIDE 5 MILLIGRAM(S): 5 TABLET ORAL at 23:10

## 2023-03-08 RX ADMIN — Medication 100 MILLIEQUIVALENT(S): at 06:19

## 2023-03-08 RX ADMIN — OXYCODONE HYDROCHLORIDE 5 MILLIGRAM(S): 5 TABLET ORAL at 23:40

## 2023-03-08 RX ADMIN — DEXMEDETOMIDINE HYDROCHLORIDE IN 0.9% SODIUM CHLORIDE 14 MICROGRAM(S)/KG/HR: 4 INJECTION INTRAVENOUS at 00:54

## 2023-03-08 RX ADMIN — Medication 100 MILLIGRAM(S): at 21:19

## 2023-03-08 NOTE — DIETITIAN INITIAL EVALUATION ADULT - REASON INDICATOR FOR ASSESSMENT
Assessment warranted for length of stay/CTU admission.  Information obtained from pt, medical record.

## 2023-03-08 NOTE — PHYSICAL THERAPY INITIAL EVALUATION ADULT - PERTINENT HX OF CURRENT PROBLEM, REHAB EVAL
71 yo Man with PMHx of HTN, HLD, DMT2 (last A1C 6.2) presents for cardiac cath. pt reports he has been feeling intermittent SOB and dizziness, evaluated by Dr. Vieira and had abnormal cardiac CTA after having abnormal TTE and exercise stress test. He denies chest pain, palpitation or SOB currently. S/p CABG on 3/7/23

## 2023-03-08 NOTE — PROGRESS NOTE ADULT - SUBJECTIVE AND OBJECTIVE BOX
Patient seen and examined at the bedside.    Remained critically ill on continuous ICU monitoring.      Brief Summary:  73 yo M with 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      24 Hour events:  - Start ASA and Lovenox today   - Given Albumin this morning and consider giving another unit   - Consider giving a unit of blood   - Try to get off Levophed   - Start Insulin Sliding Scale    OBJECTIVE:  Vital Signs Last 24 Hrs  T(C): 37.5 (08 Mar 2023 08:00), Max: 37.5 (08 Mar 2023 08:00)  T(F): 99.5 (08 Mar 2023 08:00), Max: 99.5 (08 Mar 2023 08:00)  HR: 88 (08 Mar 2023 10:15) (77 - 90)  BP: 133/60 (08 Mar 2023 10:15) (103/54 - 156/88)  BP(mean): 87 (08 Mar 2023 10:15) (74 - 95)  RR: 27 (08 Mar 2023 10:15) (7 - 31)  SpO2: 100% (08 Mar 2023 10:15) (98% - 100%)    Parameters below as of 08 Mar 2023 08:00  Patient On (Oxygen Delivery Method): mask, simple face    O2 Concentration (%): 40    Mode: standby              Physical Exam:   General: NAD   Neurology: nonfocal   Eyes: bilateral pupils equal and reactive   ENT/Neck: Neck supple, trachea midline, No JVD   Respiratory: Clear bilaterally   CV: Paced Rhythm  Abdominal: Soft, NT, ND +BS   Extremities: 1-2+ pedal edema noted, + peripheral pulses   Skin: No Rashes, Hematoma, Ecchymosis         -------------------------------------------------------------------------------------------------------------------------------  Labs:                        8.4    8.64  )-----------( 185      ( 07 Mar 2023 23:27 )             25.0     03-07    143  |  109<H>  |  15  ----------------------------<  105<H>  4.7   |  24  |  0.84    Ca    8.3<L>      07 Mar 2023 23:27  Phos  5.1     03-07  Mg     1.9     03-07    TPro  4.7<L>  /  Alb  3.2<L>  /  TBili  0.3  /  DBili  x   /  AST  31  /  ALT  19  /  AlkPhos  45  03-07    LIVER FUNCTIONS - ( 07 Mar 2023 23:27 )  Alb: 3.2 g/dL / Pro: 4.7 g/dL / ALK PHOS: 45 U/L / ALT: 19 U/L / AST: 31 U/L / GGT: x           PT/INR - ( 07 Mar 2023 23:27 )   PT: 13.1 sec;   INR: 1.13 ratio         PTT - ( 07 Mar 2023 23:27 )  PTT:26.7 sec  ABG - ( 08 Mar 2023 06:48 )  pH, Arterial: 7.31  pH, Blood: x     /  pCO2: 49    /  pO2: 156   / HCO3: 25    / Base Excess: -1.7  /  SaO2: 97.3      ------------------------------------------------------------------------------------------------------------------------------  Assessment:  73 yo Man with PMHx of HTN, HLD, DMT2 (last A1C 6.2) presents for cardiac cath. pt reports he has been feeling intermittent SOB and dizziness, evaluated by Dr. Vieira and had abnormal cardiac CTA after having abnormal TTE and exercise stress test. He denies chest pain, palpitation or SOB currently.     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  Sedation as needed while intubated.   Postoperative acute pain control with Tylenol, Gabapentin, PRN Oxycodone, and PRN Dilaudid      ***Cardiovascular***  Invasive hemodynamic monitoring, assess perfusion indices   At high risk for hemodynamic instability and cardiac arrhythmias.  IVF for perioperative hypovolemia.  Wean pressors for MAP > 65 mm Hg   Amiodarone for A. Fib prophylaxis   ASA/Statin daily   Monitor chest tube output.      ***Pulmonary***  Postoperative acute respiratory insufficiency   Deep breathing and coughing exercises.  Wean oxygen as able.      ***GI***  Tolerating Diet   Protein calorie malnutrition - Tube feeds via nasal feeding tube.   Protonix for stress ulcer prophylaxis   Bowel regimen     ***Renal***  Wong catheter for strict I/O measurements.   Replete electrolytes as indicated.      ***ID***  Cefuroxime for perioperative antibiotic coverage     ***Endocrine***  Stress Hyperglycemia  Insulin as needed to maintain euglycemia. *Remove if no insulin*    ***Hematology***  Acute blood loss anemia - no current transfusion indication   Lovenox for DVT prophylaxis           Patient requires continuous monitoring with bedside rhythm monitoring, pulse oximetry monitoring, and continuous central venous and arterial pressure monitoring; and intermittent blood gas analysis. Care plan discussed with the ICU care team.   Patient remained critical, at risk for life threatening decompensation.    I have spent 30 minutes providing critical care management to this patient.    By signing my name below, I, Leighton Carpio, attest that this documentation has been prepared under the direction and in the presence of Molly Echeverria MD  Electronically signed: Leighton Carpio, 03-08-23 @ 11:25    I, Molly Echeverria MD, personally performed the services described in this documentation. all medical record entries made by the scribe were at my direction and in my presence. I have reviewed the chart and agree that the record reflects my personal performance and is accurate and complete  Electronically signed: Molly Echeverria MD Patient seen and examined at the bedside.        Brief Summary:  73 yo M s/p C3L 03/07/23      24 Hour events:  - Returned to OR for mediastinal exploration overnight.  - Extubated this morning.  - On Norepinephrine this morning.  - Transfused prbcs - Norepinephrine weaned to off.      Objective:  Vital Signs Last 24 Hrs  T(C): 37.5 (08 Mar 2023 08:00), Max: 37.5 (08 Mar 2023 08:00)  T(F): 99.5 (08 Mar 2023 08:00), Max: 99.5 (08 Mar 2023 08:00)  HR: 88 (08 Mar 2023 10:15) (77 - 90)  BP: 133/60 (08 Mar 2023 10:15) (103/54 - 156/88)  BP(mean): 87 (08 Mar 2023 10:15) (74 - 95)  RR: 27 (08 Mar 2023 10:15) (7 - 31)  SpO2: 100% (08 Mar 2023 10:15) (98% - 100%)              Physical Exam:   General: Awake, interactive  Neurology: Follows commands, no focal deficits.  Respiratory: Clear bilaterally   CV: Paced Rhythm  Abdominal: Soft, Nontender   Extremities: Warm         -------------------------------------------------------------------------------------------------------------------------------  Labs:                        8.4    8.64  )-----------( 185      ( 07 Mar 2023 23:27 )             25.0     03-07    143  |  109<H>  |  15  ----------------------------<  105<H>  4.7   |  24  |  0.84    Ca    8.3<L>      07 Mar 2023 23:27  Phos  5.1     03-07  Mg     1.9     03-07    TPro  4.7<L>  /  Alb  3.2<L>  /  TBili  0.3  /  DBili  x   /  AST  31  /  ALT  19  /  AlkPhos  45  03-07    LIVER FUNCTIONS - ( 07 Mar 2023 23:27 )  Alb: 3.2 g/dL / Pro: 4.7 g/dL / ALK PHOS: 45 U/L / ALT: 19 U/L / AST: 31 U/L / GGT: x           PT/INR - ( 07 Mar 2023 23:27 )   PT: 13.1 sec;   INR: 1.13 ratio         PTT - ( 07 Mar 2023 23:27 )  PTT:26.7 sec  ABG - ( 08 Mar 2023 06:48 )  pH, Arterial: 7.31  pH, Blood: x     /  pCO2: 49    /  pO2: 156   / HCO3: 25    / Base Excess: -1.7  /  SaO2: 97.3      ------------------------------------------------------------------------------------------------------------------------------  Assessment:  73 yo M s/p C3L 03/07/23    At high risk for hemodynamic instability   Hypovolemia   Post op respiratory insufficiency  Acute blood loss anemia   Stress hyperglycemia        Plan:   ***Neuro***  Postoperative acute pain control with Tylenol, Gabapentin, PRN Oxycodone, and PRN Dilaudid      ***Cardiovascular***  At high risk for hemodynamic instability and cardiac arrhythmias.  IVF for perioperative hypovolemia.  ASA/Statin daily   Hold on beta blocker for now.  Monitor chest tube output.    ***Pulmonary***  Postoperative acute respiratory insufficiency   Deep breathing and coughing exercises.  Wean oxygen as able.    ***GI***  Tolerating Diet   Protonix for stress ulcer prophylaxis   Bowel regimen     ***Renal***  Wong catheter for strict I/O measurements.   Replete electrolytes as indicated.  Trend creatinine    ***ID***  Cefuroxime for perioperative antibiotic coverage     ***Endocrine***  Stress Hyperglycemia  Insulin as needed to maintain euglycemia.     ***Hematology***  Acute blood loss anemia - s/p transfusion today.  Trend CBC and monitor for bleeding.  Lovenox for DVT prophylaxis         Care plan discussed with the ICU care team.   Patient remains at risk for life threatening decompensation.    I have spent 30 minutes providing critical care management to this patient.    By signing my name below, I, Leighton Carpio, attest that this documentation has been prepared under the direction and in the presence of Molly Echeverria MD  Electronically signed: Leighton Carpio, 03-08-23 @ 11:25    I, Molly Echeverria MD, personally performed the services described in this documentation. all medical record entries made by the scribe were at my direction and in my presence. I have reviewed the chart and agree that the record reflects my personal performance and is accurate and complete  Electronically signed: Molly Echeverria MD

## 2023-03-08 NOTE — PHYSICAL THERAPY INITIAL EVALUATION ADULT - GENERAL OBSERVATIONS, REHAB EVAL
Pt encountered OOB in chair, NAD Pt encountered OOB in chair, NAD, +5L NC, +tele, +chest tubes, +ext pacer, +wound vac, +casiano, +HERNANDO, +lola

## 2023-03-08 NOTE — DIETITIAN INITIAL EVALUATION ADULT - REASON FOR ADMISSION
Pt is a 71 yo Man with PMHx of HTN, HLD, ?DMT2 s/p  cardiac cath on 3/2/23 revealing triple vessel disease now s/p CABG on 3/7/23.

## 2023-03-08 NOTE — DIETITIAN INITIAL EVALUATION ADULT - PERTINENT MEDS FT
MEDICATIONS  (STANDING):  acetaminophen     Tablet .. 650 milliGRAM(s) Oral every 6 hours  aMIOdarone    Tablet 400 milliGRAM(s) Oral two times a day  ascorbic acid 500 milliGRAM(s) Oral two times a day  aspirin enteric coated 81 milliGRAM(s) Oral daily  atorvastatin 80 milliGRAM(s) Oral at bedtime  cefuroxime  IVPB 1500 milliGRAM(s) IV Intermittent every 8 hours  chlorhexidine 2% Cloths 1 Application(s) Topical daily  dorzolamide 2% Ophthalmic Solution 1 Drop(s) Both EYES <User Schedule>  enoxaparin Injectable 40 milliGRAM(s) SubCutaneous every 24 hours  gabapentin 100 milliGRAM(s) Oral every 8 hours  insulin lispro (ADMELOG) corrective regimen sliding scale   SubCutaneous Before meals and at bedtime  montelukast 10 milliGRAM(s) Oral daily  norepinephrine Infusion 0.02 MICROgram(s)/kG/Min (3.49 mL/Hr) IV Continuous <Continuous>  pantoprazole    Tablet 40 milliGRAM(s) Oral before breakfast  polyethylene glycol 3350 17 Gram(s) Oral daily  potassium chloride  10 mEq/50 mL IVPB 10 milliEquivalent(s) IV Intermittent every 1 hour  potassium chloride  10 mEq/50 mL IVPB 10 milliEquivalent(s) IV Intermittent every 1 hour  potassium chloride  10 mEq/50 mL IVPB 10 milliEquivalent(s) IV Intermittent every 1 hour  senna 2 Tablet(s) Oral at bedtime  sodium chloride 0.9%. 1000 milliLiter(s) (10 mL/Hr) IV Continuous <Continuous>    MEDICATIONS  (PRN):  HYDROmorphone  Injectable 0.5 milliGRAM(s) IV Push every 6 hours PRN Breakthrough Pain  oxyCODONE    IR 5 milliGRAM(s) Oral every 4 hours PRN Moderate Pain (4 - 6)  oxyCODONE    IR 10 milliGRAM(s) Oral every 4 hours PRN Severe Pain (7 - 10)

## 2023-03-08 NOTE — DIETITIAN INITIAL EVALUATION ADULT - ETIOLOGY
increased physiological demand for nutrients secondary to surgical healing limited exposure to nutrition related topics

## 2023-03-08 NOTE — DIETITIAN INITIAL EVALUATION ADULT - PERSON TAUGHT/METHOD
RD reviewed nutrition therapy for s/p CABG with midsternal incision. Emphasized importance of adequate lean protein intake and optimal blood glucose levels for wound healing. Reviewed sources of protein, carbohydrates. Advised pt to limit concentrated sweets, portion control, and importance of fiber intake. Encouraged pairing of protein/carbohydrate foods to promote glycemic control. All questions answered. Pt made aware RD to remain available./verbal instruction/teach back - (Patient repeats in own words)/patient instructed

## 2023-03-08 NOTE — AIRWAY REMOVAL NOTE  ADULT & PEDS - ARTIFICAL AIRWAY REMOVAL COMMENTS
Written order for extubation verified. The patient was identified by full name and birth date compared to the identification band. Pt stable on AM 40% with no signs of resp distress or stridor noted. Present during the procedure was Dr. Ann and LEELA Whittaker

## 2023-03-08 NOTE — PHYSICAL THERAPY INITIAL EVALUATION ADULT - ADDITIONAL COMMENTS
Patient reports he lives in a private house with his spouse. He reports he was fully independent prior. Patient reports he lives in a private house with his spouse. He has 3 steps to enter and 13 steps to enter. Patient states he was fully independent prior.

## 2023-03-08 NOTE — DIETITIAN INITIAL EVALUATION ADULT - ORAL INTAKE PTA/DIET HISTORY
Pt reports having a good appetite and PO intake PTA; consuming >75% of most meals. Follows regular diet; states his wife is a vegetarian and is very mindful of pt's intake. Consumes well-balanced diet; reports regular exercise prior to admission. Pt denies any known food allergies or intolerances. Pt denies any micronutrient supplementation at home.

## 2023-03-08 NOTE — DIETITIAN INITIAL EVALUATION ADULT - OTHER INFO
-- Pt with HbA1c of 6.0% (states he was told he has T2DM however is aware he is only in pre-DM range). Currently on sliding scale insulin for BG management.  -- Remains on norepinephrine for pressor support.   -- Reports UBW 205lbs, states he has been trying to lose weight through diet and exercise. Dosing wt 205lbs (3/7) and most recent wt 236.9lbs (3/8); weight gain likely secondary to perioperative fluid shifts, will continue to monitor.

## 2023-03-08 NOTE — PROGRESS NOTE ADULT - SUBJECTIVE AND OBJECTIVE BOX
Subjective: Patient states "Hello"    VITAL SIGNS    Telemetry:    Overnight Events: no acute events    Vital Signs Last 24 Hrs  T(C): 37.3 (23 @ 17:02), Max: 38.2 (23 @ 16:00)  T(F): 99.1 (23 @ 17:02), Max: 100.8 (23 @ 16:00)  HR: 79 (23 @ 17:02) (77 - 90)  BP: 116/55 (23 @ 17:02) (103/54 - 156/88)  RR: 18 (23 @ 17:02) (7 - 33)  SpO2: 100% (23 @ 17:02) (80% - 100%)             @ 07:01  -   @ 07:00  --------------------------------------------------------  IN: 5429.9 mL / OUT: 4485 mL / NET: 944.9 mL     07:01  -   @ 19:07  --------------------------------------------------------  IN: 1201.1 mL / OUT: 1495 mL / NET: -293.9 mL       Daily Height in cm: 177.8 (07 Mar 2023 21:43)    Daily Weight in k.5 (08 Mar 2023 00:00)  Admit Wt: Drug Dosing Weight  Height (cm): 177.8 (07 Mar 2023 21:43)  Weight (kg): 93 (07 Mar 2023 21:43)  BMI (kg/m2): 29.4 (07 Mar 2023 21:43)  BSA (m2): 2.11 (07 Mar 2023 21:43)    Bilirubin Total, Serum: 0.3 mg/dL ( @ 23:27)    CAPILLARY BLOOD GLUCOSE      POCT Blood Glucose.: 156 mg/dL (08 Mar 2023 19:00)  POCT Blood Glucose.: 177 mg/dL (08 Mar 2023 17:59)  POCT Blood Glucose.: 186 mg/dL (08 Mar 2023 17:31)  POCT Blood Glucose.: 160 mg/dL (08 Mar 2023 16:24)  POCT Blood Glucose.: 121 mg/dL (08 Mar 2023 15:06)  POCT Blood Glucose.: 120 mg/dL (08 Mar 2023 14:07)  POCT Blood Glucose.: 185 mg/dL (08 Mar 2023 13:00)  POCT Blood Glucose.: 195 mg/dL (08 Mar 2023 11:49)  POCT Blood Glucose.: 129 mg/dL (08 Mar 2023 08:54)  POCT Blood Glucose.: 120 mg/dL (08 Mar 2023 06:47)  POCT Blood Glucose.: 122 mg/dL (08 Mar 2023 05:57)  POCT Blood Glucose.: 130 mg/dL (08 Mar 2023 04:48)  POCT Blood Glucose.: 133 mg/dL (08 Mar 2023 03:46)  POCT Blood Glucose.: 135 mg/dL (08 Mar 2023 02:55)  POCT Blood Glucose.: 99 mg/dL (08 Mar 2023 01:47)  POCT Blood Glucose.: 111 mg/dL (08 Mar 2023 00:46)  POCT Blood Glucose.: 122 mg/dL (07 Mar 2023 23:59)  POCT Blood Glucose.: 104 mg/dL (07 Mar 2023 23:07)  POCT Blood Glucose.: 105 mg/dL (07 Mar 2023 20:51)  POCT Blood Glucose.: 114 mg/dL (07 Mar 2023 20:02)          acetaminophen     Tablet .. 650 milliGRAM(s) Oral every 6 hours  aMIOdarone    Tablet 400 milliGRAM(s) Oral two times a day  ascorbic acid 500 milliGRAM(s) Oral two times a day  aspirin enteric coated 81 milliGRAM(s) Oral daily  atorvastatin 80 milliGRAM(s) Oral at bedtime  cefuroxime  IVPB 1500 milliGRAM(s) IV Intermittent every 8 hours  chlorhexidine 2% Cloths 1 Application(s) Topical daily  dextrose 50% Injectable 50 milliLiter(s) IV Push every 15 minutes  dorzolamide 2% Ophthalmic Solution 1 Drop(s) Both EYES <User Schedule>  enoxaparin Injectable 40 milliGRAM(s) SubCutaneous every 24 hours  gabapentin 100 milliGRAM(s) Oral every 8 hours  HYDROmorphone  Injectable 0.5 milliGRAM(s) IV Push every 6 hours PRN  insulin regular Infusion 2 Unit(s)/Hr IV Continuous <Continuous>  montelukast 10 milliGRAM(s) Oral daily  oxyCODONE    IR 5 milliGRAM(s) Oral every 4 hours PRN  oxyCODONE    IR 10 milliGRAM(s) Oral every 4 hours PRN  pantoprazole    Tablet 40 milliGRAM(s) Oral before breakfast  polyethylene glycol 3350 17 Gram(s) Oral daily  senna 2 Tablet(s) Oral at bedtime  sodium chloride 0.9%. 1000 milliLiter(s) IV Continuous <Continuous>  tamsulosin 0.4 milliGRAM(s) Oral at bedtime                            8.4    8.64  )-----------( 185      ( 07 Mar 2023 23:27 )             25.0     03-07    143  |  109<H>  |  15  ----------------------------<  105<H>  4.7   |  24  |  0.84    Ca    8.3<L>      07 Mar 2023 23:27  Phos  5.1     03-07  Mg     1.9     03-07    TPro  4.7<L>  /  Alb  3.2<L>  /  TBili  0.3  /  DBili  x   /  AST  31  /  ALT  19  /  AlkPhos  45  03-07    PT/INR - ( 07 Mar 2023 23:27 )   PT: 13.1 sec;   INR: 1.13 ratio         PTT - ( 07 Mar 2023 23:27 )  PTT:26.7 sec    PHYSICAL EXAM    Neurology: A&Ox3, NAD  CV : RRR+S1S2  Sternal Wound: MSI CDI , Stable  Lungs: Respirations non-labored, B/L BS  Abdomen: Soft, NT/ND, +BSx4Q, last BM   (-)N/V/D  : Voiding   Extremities: B/L LE edema, negative calf tenderness, +PP , SVG incision         Drains:     MS         [  ] Drainage:                 L Pleural  [  ]  Drainage:                R Pleural  [ ]  Drainage:      R/L PTC  [ ]    Drainage:     Pacing Wires        [  ]   Settings:                                  Isolated  [  ]     Coumadin    [ ] YES          [ ]      NO         Eliquis    [ ] YES          [ ]      NO       Heparin gtt   [  ] YES           [ ]   NO        Argatroban gtt   [  ] YES         [ ]   NO    Disposition:  Home [   ]     Rehab [   ]       OR Date:                Subjective: Patient states "Hello"    VITAL SIGNS    Telemetry: SR 70's (79)  Overnight Events: no acute events    Vital Signs Last 24 Hrs  T(C): 37.3 (23 @ 17:02), Max: 38.2 (23 @ 16:00)  T(F): 99.1 (23 @ 17:02), Max: 100.8 (23 @ 16:00)  HR: 79 (23 @ 17:02) (77 - 90)  BP: 116/55 (23 @ 17:02) (103/54 - 156/88)  RR: 18 (23 @ 17:02) (7 - 33)  SpO2: 100% (23 @ 17:02) (80% - 100%)             @ 07:01  -   @ 07:00  --------------------------------------------------------  IN: 5429.9 mL / OUT: 4485 mL / NET: 944.9 mL     @ 07:01  -   @ 19:07  --------------------------------------------------------  IN: 1201.1 mL / OUT: 1495 mL / NET: -293.9 mL       Daily Height in cm: 177.8 (07 Mar 2023 21:43)    Daily Weight in k.5 (08 Mar 2023 00:00)  Admit Wt: Drug Dosing Weight  Height (cm): 177.8 (07 Mar 2023 21:43)  Weight (kg): 93 (07 Mar 2023 21:43)  BMI (kg/m2): 29.4 (07 Mar 2023 21:43)  BSA (m2): 2.11 (07 Mar 2023 21:43)    Bilirubin Total, Serum: 0.3 mg/dL ( @ 23:27)    CAPILLARY BLOOD GLUCOSE      POCT Blood Glucose.: 156 mg/dL (08 Mar 2023 19:00)  POCT Blood Glucose.: 177 mg/dL (08 Mar 2023 17:59)  POCT Blood Glucose.: 186 mg/dL (08 Mar 2023 17:31)  POCT Blood Glucose.: 160 mg/dL (08 Mar 2023 16:24)  POCT Blood Glucose.: 121 mg/dL (08 Mar 2023 15:06)  POCT Blood Glucose.: 120 mg/dL (08 Mar 2023 14:07)  POCT Blood Glucose.: 185 mg/dL (08 Mar 2023 13:00)  POCT Blood Glucose.: 195 mg/dL (08 Mar 2023 11:49)  POCT Blood Glucose.: 129 mg/dL (08 Mar 2023 08:54)  POCT Blood Glucose.: 120 mg/dL (08 Mar 2023 06:47)  POCT Blood Glucose.: 122 mg/dL (08 Mar 2023 05:57)  POCT Blood Glucose.: 130 mg/dL (08 Mar 2023 04:48)  POCT Blood Glucose.: 133 mg/dL (08 Mar 2023 03:46)  POCT Blood Glucose.: 135 mg/dL (08 Mar 2023 02:55)  POCT Blood Glucose.: 99 mg/dL (08 Mar 2023 01:47)  POCT Blood Glucose.: 111 mg/dL (08 Mar 2023 00:46)  POCT Blood Glucose.: 122 mg/dL (07 Mar 2023 23:59)  POCT Blood Glucose.: 104 mg/dL (07 Mar 2023 23:07)  POCT Blood Glucose.: 105 mg/dL (07 Mar 2023 20:51)  POCT Blood Glucose.: 114 mg/dL (07 Mar 2023 20:02)          acetaminophen     Tablet .. 650 milliGRAM(s) Oral every 6 hours  aMIOdarone    Tablet 400 milliGRAM(s) Oral two times a day  ascorbic acid 500 milliGRAM(s) Oral two times a day  aspirin enteric coated 81 milliGRAM(s) Oral daily  atorvastatin 80 milliGRAM(s) Oral at bedtime  cefuroxime  IVPB 1500 milliGRAM(s) IV Intermittent every 8 hours  chlorhexidine 2% Cloths 1 Application(s) Topical daily  dextrose 50% Injectable 50 milliLiter(s) IV Push every 15 minutes  dorzolamide 2% Ophthalmic Solution 1 Drop(s) Both EYES <User Schedule>  enoxaparin Injectable 40 milliGRAM(s) SubCutaneous every 24 hours  gabapentin 100 milliGRAM(s) Oral every 8 hours  HYDROmorphone  Injectable 0.5 milliGRAM(s) IV Push every 6 hours PRN  insulin regular Infusion 2 Unit(s)/Hr IV Continuous <Continuous>  montelukast 10 milliGRAM(s) Oral daily  oxyCODONE    IR 5 milliGRAM(s) Oral every 4 hours PRN  oxyCODONE    IR 10 milliGRAM(s) Oral every 4 hours PRN  pantoprazole    Tablet 40 milliGRAM(s) Oral before breakfast  polyethylene glycol 3350 17 Gram(s) Oral daily  senna 2 Tablet(s) Oral at bedtime  sodium chloride 0.9%. 1000 milliLiter(s) IV Continuous <Continuous>  tamsulosin 0.4 milliGRAM(s) Oral at bedtime                            8.4    8.64  )-----------( 185      ( 07 Mar 2023 23:27 )             25.0     03-07    143  |  109<H>  |  15  ----------------------------<  105<H>  4.7   |  24  |  0.84    Ca    8.3<L>      07 Mar 2023 23:27  Phos  5.1     03-07  Mg     1.9     03-07    TPro  4.7<L>  /  Alb  3.2<L>  /  TBili  0.3  /  DBili  x   /  AST  31  /  ALT  19  /  AlkPhos  45  03-07    PT/INR - ( 07 Mar 2023 23:27 )   PT: 13.1 sec;   INR: 1.13 ratio         PTT - ( 07 Mar 2023 23:27 )  PTT:26.7 sec    PHYSICAL EXAM    Neurology: A&Ox3, NAD  CV : RRR+S1S2  + RIJ + PW  Sternal Wound: MSI CDI +Provena in-place, Stable  + MED CTx1, serousanguinous drainage  +LP CT x1 seroussanguinous drainage  Lungs: Respirations non-labored, B/L BS clear  Abdomen: Soft, NT/ND, +BSx4Q, (-)N/V/D  : +Wong  Extremities: negative B/L LE edema, negative calf tenderness, +PP B/L , R SVG incision CDI, ace wrapped       Coumadin    [ ] YES          [ ]      NO         Eliquis    [ ] YES          [ ]      NO       Heparin gtt   [  ] YES           [ ]   NO        Argatroban gtt   [  ] YES         [ ]   NO    Disposition:  Home [   ]     Rehab [   ]       OR Date:

## 2023-03-08 NOTE — PROGRESS NOTE ADULT - ASSESSMENT
73 yo Man with PMHx of HTN, HLD, DMT2 (last A1C 6.2) presents for cardiac cath. pt reports he has been feeling intermittent SOB and dizziness, evaluated by Dr. Vieira and had abnormal cardiac CTA after having abnormal TTE and exercise stress test. He denies chest pain, palpitation or SOB currently.     Pt now s/p cardiac cath on 3/2/23 revealing triple vessel disease (mLAD 90%, distal Cx 80%, mRCA 80%). Ct surgery consulted for CABG eval with Dr. Torres.  likely plan for OR either Tuesday or Thursday.    3/3 VSS overnight. HR/BP stable on Lopressor 12.5mg BID. No CP/SOB.  Pending TTE today.  P2y12 187 this AM.  Repeat TSH 5 however remainder of Thyroid panel normal.  UA and MRSA PCR ordered   3/4 VSS no chest pain OR tuesday. Continue preop work up  3/5 VSS CP free OR Tuesday  3/6 Preop OR tomorrow  3/7 S/P C3L, extubated Intra-Op with intrathecal morphine given. RTOR for bleeding. 2u PRBC post op.  3/8 Transfer to SDU from CTU, + RIJ in place, + PW, + MED CTx1, + LP CT x1 , monitor drainage, + Wong in place,

## 2023-03-08 NOTE — DIETITIAN INITIAL EVALUATION ADULT - NAME AND PHONE
Sheryl Serrano MS, RD, CDN, [facility]; Pager #[phone] or MS Teams (preferred)

## 2023-03-08 NOTE — DIETITIAN INITIAL EVALUATION ADULT - PERTINENT LABORATORY DATA
03-07    143  |  109<H>  |  15  ----------------------------<  105<H>  4.7   |  24  |  0.84    Ca    8.3<L>      07 Mar 2023 23:27  Phos  5.1     03-07  Mg     1.9     03-07    TPro  4.7<L>  /  Alb  3.2<L>  /  TBili  0.3  /  DBili  x   /  AST  31  /  ALT  19  /  AlkPhos  45  03-07  POCT Blood Glucose.: 129 mg/dL (03-08-23 @ 08:54)  A1C with Estimated Average Glucose Result: 6.0 % (03-03-23 @ 05:57)  A1C with Estimated Average Glucose Result: 6.1 % (03-02-23 @ 19:19)

## 2023-03-08 NOTE — DIETITIAN INITIAL EVALUATION ADULT - NSFNSGIIOFT_GEN_A_CORE
Pt denies any N/V; last BM 3/4.    03-07-23 @ 07:01  -  03-08-23 @ 07:00  --------------------------------------------------------  OUT:    Chest Tube (mL): 770 mL    Chest Tube (mL): 1470 mL    Nasogastric/Oral tube (mL): 150 mL  Total OUT: 2390 mL    Total NET: -2390 mL      03-08-23 @ 07:01  -  03-08-23 @ 11:28  --------------------------------------------------------  OUT:    Chest Tube (mL): 100 mL    Chest Tube (mL): 10 mL  Total OUT: 110 mL    Total NET: -110 mL

## 2023-03-08 NOTE — PROGRESS NOTE ADULT - PROBLEM SELECTOR PLAN 1
c/w [id number], Ator 81mgQD  BB not yet started discuss when to restart  + RIJ in place,   + PW,   + MED CTx1, + LP CT x1 , monitor drainage,   + Wong in place  Cough and deep breathe, Incentive Spirometry Q1h, Chest PT, Pulm Toilet  Ambulate 3x daily as tolerated and with PT  C/W GI prophylaxis  DVT prophylaxis

## 2023-03-09 LAB
ALBUMIN SERPL ELPH-MCNC: 3.3 G/DL — SIGNIFICANT CHANGE UP (ref 3.3–5)
ALBUMIN SERPL ELPH-MCNC: 3.8 G/DL — SIGNIFICANT CHANGE UP (ref 3.3–5)
ALP SERPL-CCNC: 46 U/L — SIGNIFICANT CHANGE UP (ref 40–120)
ALP SERPL-CCNC: 51 U/L — SIGNIFICANT CHANGE UP (ref 40–120)
ALT FLD-CCNC: 18 U/L — SIGNIFICANT CHANGE UP (ref 10–45)
ALT FLD-CCNC: 23 U/L — SIGNIFICANT CHANGE UP (ref 10–45)
ANION GAP SERPL CALC-SCNC: 13 MMOL/L — SIGNIFICANT CHANGE UP (ref 5–17)
ANION GAP SERPL CALC-SCNC: 9 MMOL/L — SIGNIFICANT CHANGE UP (ref 5–17)
APTT BLD: 28.3 SEC — SIGNIFICANT CHANGE UP (ref 27.5–35.5)
AST SERPL-CCNC: 30 U/L — SIGNIFICANT CHANGE UP (ref 10–40)
AST SERPL-CCNC: 32 U/L — SIGNIFICANT CHANGE UP (ref 10–40)
BILIRUB SERPL-MCNC: 0.6 MG/DL — SIGNIFICANT CHANGE UP (ref 0.2–1.2)
BILIRUB SERPL-MCNC: 0.6 MG/DL — SIGNIFICANT CHANGE UP (ref 0.2–1.2)
BUN SERPL-MCNC: 16 MG/DL — SIGNIFICANT CHANGE UP (ref 7–23)
BUN SERPL-MCNC: 20 MG/DL — SIGNIFICANT CHANGE UP (ref 7–23)
CALCIUM SERPL-MCNC: 7.9 MG/DL — LOW (ref 8.4–10.5)
CALCIUM SERPL-MCNC: 8 MG/DL — LOW (ref 8.4–10.5)
CHLORIDE SERPL-SCNC: 103 MMOL/L — SIGNIFICANT CHANGE UP (ref 96–108)
CHLORIDE SERPL-SCNC: 99 MMOL/L — SIGNIFICANT CHANGE UP (ref 96–108)
CK MB BLD-MCNC: 0.6 % — SIGNIFICANT CHANGE UP (ref 0–3.5)
CK MB CFR SERPL CALC: 4 NG/ML — SIGNIFICANT CHANGE UP (ref 0–6.7)
CK SERPL-CCNC: 620 U/L — HIGH (ref 30–200)
CO2 SERPL-SCNC: 22 MMOL/L — SIGNIFICANT CHANGE UP (ref 22–31)
CO2 SERPL-SCNC: 25 MMOL/L — SIGNIFICANT CHANGE UP (ref 22–31)
CREAT SERPL-MCNC: 0.84 MG/DL — SIGNIFICANT CHANGE UP (ref 0.5–1.3)
CREAT SERPL-MCNC: 1.09 MG/DL — SIGNIFICANT CHANGE UP (ref 0.5–1.3)
EGFR: 71 ML/MIN/1.73M2 — SIGNIFICANT CHANGE UP
EGFR: 92 ML/MIN/1.73M2 — SIGNIFICANT CHANGE UP
FIBRINOGEN PPP-MCNC: 523 MG/DL — HIGH (ref 200–445)
GLUCOSE BLDC GLUCOMTR-MCNC: 120 MG/DL — HIGH (ref 70–99)
GLUCOSE BLDC GLUCOMTR-MCNC: 121 MG/DL — HIGH (ref 70–99)
GLUCOSE BLDC GLUCOMTR-MCNC: 123 MG/DL — HIGH (ref 70–99)
GLUCOSE BLDC GLUCOMTR-MCNC: 131 MG/DL — HIGH (ref 70–99)
GLUCOSE BLDC GLUCOMTR-MCNC: 138 MG/DL — HIGH (ref 70–99)
GLUCOSE BLDC GLUCOMTR-MCNC: 143 MG/DL — HIGH (ref 70–99)
GLUCOSE BLDC GLUCOMTR-MCNC: 154 MG/DL — HIGH (ref 70–99)
GLUCOSE BLDC GLUCOMTR-MCNC: 157 MG/DL — HIGH (ref 70–99)
GLUCOSE BLDC GLUCOMTR-MCNC: 91 MG/DL — SIGNIFICANT CHANGE UP (ref 70–99)
GLUCOSE SERPL-MCNC: 127 MG/DL — HIGH (ref 70–99)
GLUCOSE SERPL-MCNC: 181 MG/DL — HIGH (ref 70–99)
HCT VFR BLD CALC: 26.5 % — LOW (ref 39–50)
HCT VFR BLD CALC: 29.3 % — LOW (ref 39–50)
HGB BLD-MCNC: 8.8 G/DL — LOW (ref 13–17)
HGB BLD-MCNC: 9.5 G/DL — LOW (ref 13–17)
INR BLD: 1.19 RATIO — HIGH (ref 0.88–1.16)
MAGNESIUM SERPL-MCNC: 1.9 MG/DL — SIGNIFICANT CHANGE UP (ref 1.6–2.6)
MAGNESIUM SERPL-MCNC: 2 MG/DL — SIGNIFICANT CHANGE UP (ref 1.6–2.6)
MCHC RBC-ENTMCNC: 29.4 PG — SIGNIFICANT CHANGE UP (ref 27–34)
MCHC RBC-ENTMCNC: 29.7 PG — SIGNIFICANT CHANGE UP (ref 27–34)
MCHC RBC-ENTMCNC: 32.4 GM/DL — SIGNIFICANT CHANGE UP (ref 32–36)
MCHC RBC-ENTMCNC: 33.2 GM/DL — SIGNIFICANT CHANGE UP (ref 32–36)
MCV RBC AUTO: 89.5 FL — SIGNIFICANT CHANGE UP (ref 80–100)
MCV RBC AUTO: 90.7 FL — SIGNIFICANT CHANGE UP (ref 80–100)
NRBC # BLD: 0 /100 WBCS — SIGNIFICANT CHANGE UP (ref 0–0)
NRBC # BLD: 0 /100 WBCS — SIGNIFICANT CHANGE UP (ref 0–0)
PHOSPHATE SERPL-MCNC: 3.2 MG/DL — SIGNIFICANT CHANGE UP (ref 2.5–4.5)
PHOSPHATE SERPL-MCNC: 3.4 MG/DL — SIGNIFICANT CHANGE UP (ref 2.5–4.5)
PLATELET # BLD AUTO: 111 K/UL — LOW (ref 150–400)
PLATELET # BLD AUTO: 138 K/UL — LOW (ref 150–400)
POTASSIUM SERPL-MCNC: 4.2 MMOL/L — SIGNIFICANT CHANGE UP (ref 3.5–5.3)
POTASSIUM SERPL-MCNC: 4.3 MMOL/L — SIGNIFICANT CHANGE UP (ref 3.5–5.3)
POTASSIUM SERPL-SCNC: 4.2 MMOL/L — SIGNIFICANT CHANGE UP (ref 3.5–5.3)
POTASSIUM SERPL-SCNC: 4.3 MMOL/L — SIGNIFICANT CHANGE UP (ref 3.5–5.3)
PROT SERPL-MCNC: 5.3 G/DL — LOW (ref 6–8.3)
PROT SERPL-MCNC: 6 G/DL — SIGNIFICANT CHANGE UP (ref 6–8.3)
PROTHROM AB SERPL-ACNC: 13.8 SEC — HIGH (ref 10.5–13.4)
RBC # BLD: 2.96 M/UL — LOW (ref 4.2–5.8)
RBC # BLD: 3.23 M/UL — LOW (ref 4.2–5.8)
RBC # FLD: 14.4 % — SIGNIFICANT CHANGE UP (ref 10.3–14.5)
RBC # FLD: 14.6 % — HIGH (ref 10.3–14.5)
SODIUM SERPL-SCNC: 134 MMOL/L — LOW (ref 135–145)
SODIUM SERPL-SCNC: 137 MMOL/L — SIGNIFICANT CHANGE UP (ref 135–145)
T3 SERPL-MCNC: 60 NG/DL — LOW (ref 80–200)
T4 AB SER-ACNC: 5.1 UG/DL — SIGNIFICANT CHANGE UP (ref 4.6–12)
TROPONIN T, HIGH SENSITIVITY RESULT: 199 NG/L — HIGH (ref 0–51)
TSH SERPL-MCNC: 2.64 UIU/ML — SIGNIFICANT CHANGE UP (ref 0.27–4.2)
WBC # BLD: 13.18 K/UL — HIGH (ref 3.8–10.5)
WBC # BLD: 9.74 K/UL — SIGNIFICANT CHANGE UP (ref 3.8–10.5)
WBC # FLD AUTO: 13.18 K/UL — HIGH (ref 3.8–10.5)
WBC # FLD AUTO: 9.74 K/UL — SIGNIFICANT CHANGE UP (ref 3.8–10.5)

## 2023-03-09 PROCEDURE — 93306 TTE W/DOPPLER COMPLETE: CPT | Mod: 26

## 2023-03-09 PROCEDURE — 99291 CRITICAL CARE FIRST HOUR: CPT

## 2023-03-09 PROCEDURE — 93010 ELECTROCARDIOGRAM REPORT: CPT

## 2023-03-09 PROCEDURE — 71045 X-RAY EXAM CHEST 1 VIEW: CPT | Mod: 26

## 2023-03-09 RX ORDER — ALBUMIN HUMAN 25 %
250 VIAL (ML) INTRAVENOUS ONCE
Refills: 0 | Status: COMPLETED | OUTPATIENT
Start: 2023-03-09 | End: 2023-03-09

## 2023-03-09 RX ORDER — INSULIN LISPRO 100/ML
3 VIAL (ML) SUBCUTANEOUS
Refills: 0 | Status: DISCONTINUED | OUTPATIENT
Start: 2023-03-09 | End: 2023-03-09

## 2023-03-09 RX ORDER — POTASSIUM CHLORIDE 20 MEQ
20 PACKET (EA) ORAL ONCE
Refills: 0 | Status: DISCONTINUED | OUTPATIENT
Start: 2023-03-09 | End: 2023-03-09

## 2023-03-09 RX ORDER — INSULIN LISPRO 100/ML
5 VIAL (ML) SUBCUTANEOUS
Refills: 0 | Status: DISCONTINUED | OUTPATIENT
Start: 2023-03-09 | End: 2023-03-14

## 2023-03-09 RX ORDER — MAGNESIUM SULFATE 500 MG/ML
2 VIAL (ML) INJECTION ONCE
Refills: 0 | Status: COMPLETED | OUTPATIENT
Start: 2023-03-09 | End: 2023-03-09

## 2023-03-09 RX ORDER — INSULIN LISPRO 100/ML
VIAL (ML) SUBCUTANEOUS AT BEDTIME
Refills: 0 | Status: DISCONTINUED | OUTPATIENT
Start: 2023-03-09 | End: 2023-03-14

## 2023-03-09 RX ORDER — INSULIN LISPRO 100/ML
VIAL (ML) SUBCUTANEOUS
Refills: 0 | Status: DISCONTINUED | OUTPATIENT
Start: 2023-03-09 | End: 2023-03-14

## 2023-03-09 RX ORDER — FUROSEMIDE 40 MG
40 TABLET ORAL
Refills: 0 | Status: DISCONTINUED | OUTPATIENT
Start: 2023-03-09 | End: 2023-03-09

## 2023-03-09 RX ORDER — INSULIN GLARGINE 100 [IU]/ML
15 INJECTION, SOLUTION SUBCUTANEOUS AT BEDTIME
Refills: 0 | Status: DISCONTINUED | OUTPATIENT
Start: 2023-03-09 | End: 2023-03-14

## 2023-03-09 RX ORDER — METOPROLOL TARTRATE 50 MG
25 TABLET ORAL
Refills: 0 | Status: DISCONTINUED | OUTPATIENT
Start: 2023-03-09 | End: 2023-03-09

## 2023-03-09 RX ORDER — POTASSIUM CHLORIDE 20 MEQ
20 PACKET (EA) ORAL DAILY
Refills: 0 | Status: DISCONTINUED | OUTPATIENT
Start: 2023-03-09 | End: 2023-03-10

## 2023-03-09 RX ORDER — METOPROLOL TARTRATE 50 MG
12.5 TABLET ORAL
Refills: 0 | Status: DISCONTINUED | OUTPATIENT
Start: 2023-03-09 | End: 2023-03-09

## 2023-03-09 RX ORDER — HYDROMORPHONE HYDROCHLORIDE 2 MG/ML
0.5 INJECTION INTRAMUSCULAR; INTRAVENOUS; SUBCUTANEOUS ONCE
Refills: 0 | Status: DISCONTINUED | OUTPATIENT
Start: 2023-03-09 | End: 2023-03-09

## 2023-03-09 RX ORDER — INSULIN GLARGINE 100 [IU]/ML
10 INJECTION, SOLUTION SUBCUTANEOUS AT BEDTIME
Refills: 0 | Status: DISCONTINUED | OUTPATIENT
Start: 2023-03-09 | End: 2023-03-09

## 2023-03-09 RX ADMIN — Medication 25 MILLIGRAM(S): at 08:25

## 2023-03-09 RX ADMIN — Medication 650 MILLIGRAM(S): at 17:40

## 2023-03-09 RX ADMIN — GABAPENTIN 100 MILLIGRAM(S): 400 CAPSULE ORAL at 15:11

## 2023-03-09 RX ADMIN — ENOXAPARIN SODIUM 40 MILLIGRAM(S): 100 INJECTION SUBCUTANEOUS at 15:10

## 2023-03-09 RX ADMIN — Medication 25 GRAM(S): at 16:12

## 2023-03-09 RX ADMIN — Medication 650 MILLIGRAM(S): at 11:37

## 2023-03-09 RX ADMIN — OXYCODONE HYDROCHLORIDE 10 MILLIGRAM(S): 5 TABLET ORAL at 21:22

## 2023-03-09 RX ADMIN — OXYCODONE HYDROCHLORIDE 10 MILLIGRAM(S): 5 TABLET ORAL at 12:14

## 2023-03-09 RX ADMIN — OXYCODONE HYDROCHLORIDE 10 MILLIGRAM(S): 5 TABLET ORAL at 11:44

## 2023-03-09 RX ADMIN — OXYCODONE HYDROCHLORIDE 10 MILLIGRAM(S): 5 TABLET ORAL at 22:10

## 2023-03-09 RX ADMIN — Medication 650 MILLIGRAM(S): at 11:07

## 2023-03-09 RX ADMIN — Medication 100 MILLIGRAM(S): at 15:10

## 2023-03-09 RX ADMIN — Medication 5 UNIT(S): at 11:47

## 2023-03-09 RX ADMIN — CHLORHEXIDINE GLUCONATE 1 APPLICATION(S): 213 SOLUTION TOPICAL at 09:34

## 2023-03-09 RX ADMIN — PANTOPRAZOLE SODIUM 40 MILLIGRAM(S): 20 TABLET, DELAYED RELEASE ORAL at 05:05

## 2023-03-09 RX ADMIN — Medication 20 MILLIEQUIVALENT(S): at 11:09

## 2023-03-09 RX ADMIN — Medication 3 UNIT(S): at 08:09

## 2023-03-09 RX ADMIN — Medication 100 MILLIGRAM(S): at 05:04

## 2023-03-09 RX ADMIN — AMIODARONE HYDROCHLORIDE 400 MILLIGRAM(S): 400 TABLET ORAL at 05:05

## 2023-03-09 RX ADMIN — Medication 5 UNIT(S): at 17:41

## 2023-03-09 RX ADMIN — Medication 500 MILLIGRAM(S): at 05:05

## 2023-03-09 RX ADMIN — ATORVASTATIN CALCIUM 80 MILLIGRAM(S): 80 TABLET, FILM COATED ORAL at 21:21

## 2023-03-09 RX ADMIN — HYDROMORPHONE HYDROCHLORIDE 0.5 MILLIGRAM(S): 2 INJECTION INTRAMUSCULAR; INTRAVENOUS; SUBCUTANEOUS at 14:42

## 2023-03-09 RX ADMIN — TAMSULOSIN HYDROCHLORIDE 0.4 MILLIGRAM(S): 0.4 CAPSULE ORAL at 21:21

## 2023-03-09 RX ADMIN — DORZOLAMIDE HYDROCHLORIDE 1 DROP(S): 20 SOLUTION/ DROPS OPHTHALMIC at 21:21

## 2023-03-09 RX ADMIN — GABAPENTIN 100 MILLIGRAM(S): 400 CAPSULE ORAL at 05:05

## 2023-03-09 RX ADMIN — Medication 1: at 17:41

## 2023-03-09 RX ADMIN — Medication 1: at 11:47

## 2023-03-09 RX ADMIN — HYDROMORPHONE HYDROCHLORIDE 0.5 MILLIGRAM(S): 2 INJECTION INTRAMUSCULAR; INTRAVENOUS; SUBCUTANEOUS at 14:27

## 2023-03-09 RX ADMIN — Medication 81 MILLIGRAM(S): at 11:09

## 2023-03-09 RX ADMIN — Medication 650 MILLIGRAM(S): at 05:05

## 2023-03-09 RX ADMIN — Medication 500 MILLIGRAM(S): at 17:40

## 2023-03-09 RX ADMIN — Medication 650 MILLIGRAM(S): at 05:35

## 2023-03-09 RX ADMIN — POLYETHYLENE GLYCOL 3350 17 GRAM(S): 17 POWDER, FOR SOLUTION ORAL at 11:07

## 2023-03-09 RX ADMIN — Medication 125 MILLILITER(S): at 11:09

## 2023-03-09 RX ADMIN — Medication 650 MILLIGRAM(S): at 18:20

## 2023-03-09 RX ADMIN — GABAPENTIN 100 MILLIGRAM(S): 400 CAPSULE ORAL at 21:21

## 2023-03-09 RX ADMIN — DORZOLAMIDE HYDROCHLORIDE 1 DROP(S): 20 SOLUTION/ DROPS OPHTHALMIC at 11:14

## 2023-03-09 RX ADMIN — INSULIN GLARGINE 15 UNIT(S): 100 INJECTION, SOLUTION SUBCUTANEOUS at 21:25

## 2023-03-09 RX ADMIN — SENNA PLUS 2 TABLET(S): 8.6 TABLET ORAL at 21:21

## 2023-03-09 RX ADMIN — MONTELUKAST 10 MILLIGRAM(S): 4 TABLET, CHEWABLE ORAL at 11:06

## 2023-03-09 NOTE — CONSULT NOTE ADULT - PROBLEM SELECTOR RECOMMENDATION 2
A1C PND  FS qAC and qHS  Diabetic Diet  c/w ISS qAC/qHS
On medications,  no chest pain, stable, monitored and followed up by primary cardiothoracic team/cardiology team.  s/p cabg

## 2023-03-09 NOTE — RAPID RESPONSE TEAM SUMMARY - NSSITUATIONBACKGROUNDRRT_GEN_ALL_CORE
73 yo Man with PMHx of HTN, HLD, DMT2 (last A1C 6.2) post op day 2 from CABG. Code blue called after patient collapsed into chair while walking. Patient became hypotensive and knees "buckled" Patient transferred to bed and compressions started. ROSC achieved in first cycle of CPR.  and then >140 on repeat without use of any pressors. Patient not requiring intubation; he was mentating well answering questions but unaware of prior event. CT surgery at bedside and plan to take patient to the unit.

## 2023-03-09 NOTE — PROGRESS NOTE ADULT - SUBJECTIVE AND OBJECTIVE BOX
Subjective  ' hello I amok"    VITAL SIGNS    Telemetry:  NSR 60-80    Vital Signs Last 24 Hrs  T(C): 36.8 (23 @ 03:00), Max: 38.2 (23 @ 16:00)  T(F): 98.3 (23 @ 03:00), Max: 100.8 (23 @ 16:00)  HR: 72 (23 @ 03:00) (72 - 90)  BP: 120/56 (23 @ 03:00) (103/54 - 150/66)  RR: 18 (23 @ 03:00) (9 - 33)  SpO2: 100% (23 @ 03:00) (80% - 100%)            @ 07:01  -   @ 07:00  --------------------------------------------------------  IN: 1646.1 mL / OUT: 3065 mL / NET: -1418.9 mL    Daily Weight in k.6 (09 Mar 2023 05:30)                            8.8    9.74  )-----------( 111      ( 09 Mar 2023 05:21 )             26.5         137  |  103  |  16  ----------------------------<  127<H>  4.2   |  25  |  0.84    Ca    7.9<L>      09 Mar 2023 05:20  Phos  3.4     -09  Mg     1.9         TPro  5.3<L>  /  Alb  3.3  /  TBili  0.6  /  DBili  x   /  AST  30  /  ALT  18  /  AlkPhos  46      MEDICATIONS  (STANDING):  acetaminophen     Tablet .. 650 milliGRAM(s) Oral every 6 hours  aMIOdarone    Tablet 400 milliGRAM(s) Oral two times a day  ascorbic acid 500 milliGRAM(s) Oral two times a day  aspirin enteric coated 81 milliGRAM(s) Oral daily  atorvastatin 80 milliGRAM(s) Oral at bedtime  bisacodyl Suppository 10 milliGRAM(s) Rectal once  cefuroxime  IVPB 1500 milliGRAM(s) IV Intermittent every 8 hours  chlorhexidine 2% Cloths 1 Application(s) Topical daily  dorzolamide 2% Ophthalmic Solution 1 Drop(s) Both EYES <User Schedule>  enoxaparin Injectable 40 milliGRAM(s) SubCutaneous every 24 hours  furosemide    Tablet 40 milliGRAM(s) Oral two times a day  gabapentin 100 milliGRAM(s) Oral every 8 hours  insulin glargine Injectable (LANTUS) 10 Unit(s) SubCutaneous at bedtime  insulin lispro (ADMELOG) corrective regimen sliding scale   SubCutaneous three times a day before meals  insulin lispro (ADMELOG) corrective regimen sliding scale   SubCutaneous at bedtime  insulin lispro Injectable (ADMELOG) 3 Unit(s) SubCutaneous before breakfast  insulin lispro Injectable (ADMELOG) 3 Unit(s) SubCutaneous before lunch  insulin lispro Injectable (ADMELOG) 3 Unit(s) SubCutaneous before dinner  insulin regular Infusion 2 Unit(s)/Hr (2 mL/Hr) IV Continuous <Continuous>  metoprolol tartrate 12.5 milliGRAM(s) Oral two times a day  montelukast 10 milliGRAM(s) Oral daily  pantoprazole    Tablet 40 milliGRAM(s) Oral before breakfast  polyethylene glycol 3350 17 Gram(s) Oral daily  potassium chloride    Tablet ER 20 milliEquivalent(s) Oral daily  senna 2 Tablet(s) Oral at bedtime  sodium chloride 0.9%. 1000 milliLiter(s) (10 mL/Hr) IV Continuous <Continuous>  tamsulosin 0.4 milliGRAM(s) Oral at bedtime    MEDICATIONS  (PRN):  HYDROmorphone  Injectable 0.5 milliGRAM(s) IV Push every 6 hours PRN Breakthrough Pain  oxyCODONE    IR 5 milliGRAM(s) Oral every 4 hours PRN Moderate Pain (4 - 6)  oxyCODONE    IR 10 milliGRAM(s) Oral every 4 hours PRN Severe Pain (7 - 10)      CAPILLARY BLOOD GLUCOSE      POCT Blood Glucose.: 131 mg/dL (09 Mar 2023 07:16)  POCT Blood Glucose.: 138 mg/dL (09 Mar 2023 06:07)  POCT Blood Glucose.: 123 mg/dL (09 Mar 2023 04:26)  POCT Blood Glucose.: 121 mg/dL (09 Mar 2023 03:35)  POCT Blood Glucose.: 91 mg/dL (09 Mar 2023 02:52)  POCT Blood Glucose.: 120 mg/dL (09 Mar 2023 01:14)            Drains:     MS         [  ] Drainage:  365               L Pleural  [ x ]  Drainage:  425              R Pleural  [  ]  Drainage:    Pacing Wires        [ x ]   Settings:    vvi 40                              Isolated  [  ]                                 PHYSICAL EXAM        Neurology: alert and oriented x 3, nonfocal, no gross deficits    CV :F1D4WMP    Sternal Wound : Prevena   CDI , Stable + PW epm VVI 40  Left pleural; medsx1    Lungs :B/l Breath sound s4lnc    Abdomen: soft, nontender, nondistended, positive bowel sounds, last bowel movement preop    : casiano               Extremities:   warm well perfused equal strength throughout     B/lle + Dp + 2edema   Rt IJ                                       Physical Therapy Rec:   Home  [ x ]   Home w/ PT  [  ]  Rehab  [  ]    Discussed with Cardiothoracic Team at AM rounds.

## 2023-03-09 NOTE — CONSULT NOTE ADULT - PROBLEM SELECTOR RECOMMENDATION 3
DASH diet  Start Lopressor 12.5 BID  Hold ACE for preop
Suggest to continue medications, monitoring, FU primary team recommendations.

## 2023-03-09 NOTE — CONSULT NOTE ADULT - ASSESSMENT
Assessment  DMT2: 72y Male with DM T2 with hyperglycemia, A1C 6.1% , was on oral meds and insulin at home, s/p CABG, sugars elevated, now off insulin drip transitioned to basal and bolus, had to return to CTU for code blue.    CAD: on medications, stable, monitored. s/p CABG  HTN: on antihypertensive medications, monited, asymptomatic.  Obesity: No strict exercise routines, not on any weight loss program, neither on low calorie diet.        Discussed plan and management with Attending.   Juliet Ro MD  Cell: [phone]  Office: [phone]               Assessment  DMT2: 72y Male with DM T2 with hyperglycemia, A1C 6.1% , was on oral meds at home, s/p CABG, sugars elevated, now off insulin drip transitioned to basal and bolus, had to return to CTU for code blue.    CAD: on medications, stable, monitored. s/p CABG  HTN: on antihypertensive medications, monited, asymptomatic.  Obesity: No strict exercise routines, not on any weight loss program, neither on low calorie diet.        Discussed plan and management with Attending.   Juliet Ro MD  Cell: [phone]  Office: [phone]

## 2023-03-09 NOTE — PROGRESS NOTE ADULT - SUBJECTIVE AND OBJECTIVE BOX
CRITICAL CARE ATTENDING - CTICU    MEDICATIONS  (STANDING):  acetaminophen     Tablet .. 650 milliGRAM(s) Oral every 6 hours  aMIOdarone    Tablet 400 milliGRAM(s) Oral two times a day  ascorbic acid 500 milliGRAM(s) Oral two times a day  aspirin enteric coated 81 milliGRAM(s) Oral daily  atorvastatin 80 milliGRAM(s) Oral at bedtime  bisacodyl Suppository 10 milliGRAM(s) Rectal once  cefuroxime  IVPB 1500 milliGRAM(s) IV Intermittent every 8 hours  chlorhexidine 2% Cloths 1 Application(s) Topical daily  dorzolamide 2% Ophthalmic Solution 1 Drop(s) Both EYES <User Schedule>  enoxaparin Injectable 40 milliGRAM(s) SubCutaneous every 24 hours  gabapentin 100 milliGRAM(s) Oral every 8 hours  HYDROmorphone  Injectable 0.5 milliGRAM(s) IV Push once  insulin glargine Injectable (LANTUS) 15 Unit(s) SubCutaneous at bedtime  insulin lispro (ADMELOG) corrective regimen sliding scale   SubCutaneous three times a day before meals  insulin lispro (ADMELOG) corrective regimen sliding scale   SubCutaneous at bedtime  insulin lispro Injectable (ADMELOG) 5 Unit(s) SubCutaneous three times a day before meals  montelukast 10 milliGRAM(s) Oral daily  pantoprazole    Tablet 40 milliGRAM(s) Oral before breakfast  polyethylene glycol 3350 17 Gram(s) Oral daily  potassium chloride    Tablet ER 20 milliEquivalent(s) Oral daily  senna 2 Tablet(s) Oral at bedtime  sodium chloride 0.9%. 1000 milliLiter(s) (10 mL/Hr) IV Continuous <Continuous>  tamsulosin 0.4 milliGRAM(s) Oral at bedtime                                    8.8    9.74  )-----------( 111      ( 09 Mar 2023 05:21 )             26.5       03-09    137  |  103  |  16  ----------------------------<  127<H>  4.2   |  25  |  0.84    Ca    7.9<L>      09 Mar 2023 05:20  Phos  3.4     03-09  Mg     1.9     03-09    TPro  5.3<L>  /  Alb  3.3  /  TBili  0.6  /  DBili  x   /  AST  30  /  ALT  18  /  AlkPhos  46        PT/INR - ( 07 Mar 2023 23:27 )   PT: 13.1 sec;   INR: 1.13 ratio         PTT - ( 07 Mar 2023 23:27 )  PTT:26.7 sec        Daily     Daily Weight in k.6 (09 Mar 2023 05:30)       @ 07:  -   @ 07:00  --------------------------------------------------------  IN: 1646.1 mL / OUT: 3065 mL / NET: -1418.9 mL     @ 07:  -   @ 14:24  --------------------------------------------------------  IN: 486 mL / OUT: 350 mL / NET: 136 mL        Critically Ill patient  : [ ] preoperative ,   [ x] post operative    Requires :  [ ] Arterial Line   [ ] Central Line  [ ] PA catheter  [ ] IABP  [ ] ECMO  [ ] LVAD  [ ] Ventilator  [ ] pacemaker [ ] Impella.                      [x ] ABG's     [ x] Pulse Oxymetry Monitoring  Bedside evaluation , monitoring , treatment of hemodynamics , fluids , IVP/ IVCD meds.        Diagnosis:     POD 2 - CABG X 2 L     Return to CTICU - Cardiac Arrest / CPR     Bradycardia    Hypotension     Hypovolemia     Hemodynamic lability,  instability. Requires IVCD [ ] vasopressors [ ] inotropes  [ ] vasodilator  [ x]IVSS fluid  to maintain MAP, perfusion, C.I.     Syncope     Spontaneous ROSC     Chest Tube Drainage / Management     Post Op Bleeding - s/p return to OR post op     Thrombocytopenia     TTE - no pericardial effusion - Normal LV function - Mild RV dysfunction     ECG     Holding Beta Blockers                         Discussed with CT surgeon, Physician's Assistant - Nurse Practitioner- Critical care medicine team.   Discussed at  AM / PM rounds.   Chart, labs , films reviewed.    Cumulative Critical Care Time Given Today : 30 min

## 2023-03-09 NOTE — PROVIDER CONTACT NOTE (OTHER) - BACKGROUND
Fax received from [facility]. Patient has been approved for 12 physical therapy visits from 8/14/18 to 4/1/19. Authorization [id number]      Vika Rios    [facility]     s/p C2L on 3/7

## 2023-03-09 NOTE — CONSULT NOTE ADULT - PROBLEM SELECTOR RECOMMENDATION 9
Cath Report Pre-Madden: mLAD 90%, distal Cx 80%, mRCA 80%  c/w ASA 81, Atorvastatin 10 qHS  Start Lopressor 12.5 BID  F/u P2Y12  Carotids 3/2: L ICA 50-69% stenosis, b/l mild-mod external carotid artery stenosis  Preop w/u in progress- PFTs, TTE, MRSA, T&Sx2, preop labs  Tentative OR Date: Tues 2/7 or Thurs 2/9  Labs and imaging reviewed with Dr. Torres
May need IV insulin for glycemic control in CTU while NPO.  If eating meals:  Will start Lantus 15 units at bed time.  Will start Admelog 5 units before each meal in addition to Admelog correction scale coverage.  Will continue monitoring FS, log, and glucose trends, will Follow up.  Patient counseled for compliance with consistent low carb diet and exercise as tolerated outpatient.

## 2023-03-09 NOTE — PROGRESS NOTE ADULT - PROBLEM SELECTOR PLAN 1
c/w ASA 81QD, Ator 81mgQD Metoprolol 25 BID   d/v RIJ  D/c casiano   + PW,   + maintain  MED CTx1, + LP CT x1 , monitor drainage, - diuresis with lasix 40 bid   Cough and deep breathe, Incentive Spirometry Q1h, Chest PT, Pulm Toilet  Ambulate 3x daily as tolerated and with PT  C/W GI prophylaxis  DVT prophylaxis

## 2023-03-09 NOTE — PROGRESS NOTE ADULT - ASSESSMENT
71 yo Man with PMHx of HTN, HLD, DMT2 (last A1C 6.2) presents for cardiac cath. pt reports he has been feeling intermittent SOB and dizziness, evaluated by Dr. Vieira and had abnormal cardiac CTA after having abnormal TTE and exercise stress test. He denies chest pain, palpitation or SOB currently.     Pt now s/p cardiac cath on 3/2/23 revealing triple vessel disease (mLAD 90%, distal Cx 80%, mRCA 80%). Ct surgery consulted for CABG eval with Dr. Torres.  likely plan for OR either Tuesday or Thursday.    3/3 VSS overnight. HR/BP stable on Lopressor 12.5mg BID. No CP/SOB.  Pending TTE today.  [id number] 187 this AM.  Repeat TSH 5 however remainder of Thyroid panel normal.  UA and MRSA PCR ordered   3/4 VSS no chest pain OR tuesday. Continue preop work up  3/5 VSS CP free OR Tuesday  3/6 Preop OR tomorrow  3/7 S/P C3L, extubated Intra-Op with intrathecal morphine given. RTOR for bleeding. 2u PRBC post op.  3/8 Transfer to SDU from CTU, + RIJ in place, + PW, + MED CTx1, + LP CT x1 , monitor drainage, + Casiano in place,   3/9 VSS D/c IJ d/c  insulin gtt d/c casiano diuresis with lasix 40 bid maintain chest tube med 360 left pleura 425 initiate  Lopressor 25  BID  Lantus 10 premal 3  dispotion to home

## 2023-03-09 NOTE — PROGRESS NOTE ADULT - PROBLEM SELECTOR PLAN 2
d/c Insulin Gtt,   ISS when off gtt  A1c 6.0  lantus 10 premeal 3 d/c Insulin Gtt,    Dr Ro consulted   ISS when off gtt  A1c 6.0  lantus 10 premeal 3

## 2023-03-09 NOTE — CONSULT NOTE ADULT - SUBJECTIVE AND OBJECTIVE BOX
HPI:  73 yo Man with PMHx of HTN, HLD, DMT2 (last A1C 6.2) presents for cardiac cath. pt reports he has been feeling intermittent SOB and dizziness, evaluated by Dr. Vieira and had abnormal cardiac CTA after having abnormal TTE and exercise stress test. He denies chest pain, palpitation or SOB currently.     Card: Dr. Lai Vieira    Cardiac CT 2/22/23  Calcium score 1262  mLAD 70% large focal calcified plaque with possible severe stenosis, OM1 50-69%    TTE on 1/25 EF 55-60%, mild AS  Exercise ST in 1/27: no sig ST abnormalities        Patient has history of pre diabetes, A1C  6.0 % , on home metformin.   Endo was consulted for glycemic control.      PAST MEDICAL & SURGICAL HISTORY:  Hip pain, right      Hypertension      Glaucoma      Retinal detachments and defects  bilateral      Macular degeneration of both eyes      DM (diabetes mellitus)      S/P hip replacement, left  2006      Recent retinal detachment, total, bilateral  1989 (left), 2006 (right)      S/P appendectomy      H/O total hip arthroplasty, right  Right hip replacement 2010          FAMILY HISTORY:  Family history of breast cancer in mother (Mother)    FH: pancreatic cancer (Father)        Social History:            HOME MEDICATIONS:  Home Medications:  Aspirin Enteric Coated 81 mg oral delayed release tablet: 1 tab(s) orally once a day (02 Mar 2023 19:45)  atorvastatin 10 mg oral tablet: 1 tab(s) orally once a day (02 Mar 2023 19:45)  cilostazol 50 mg oral tablet: 1 tab(s) orally 2 times a day (02 Mar 2023 19:45)  dorzolamide 2% ophthalmic solution: 1 drop(s) to each affected eye 2 times a day (02 Mar 2023 19:45)  metFORMIN 500 mg oral tablet, extended release: 1 tab(s) orally once a day (02 Mar 2023 19:45)  montelukast 10 mg oral tablet: 1 tab(s) orally once a day (02 Mar 2023 19:45)  ramipril 2.5 mg oral capsule: 1 cap(s) orally once a day (02 Mar 2023 19:45)  tamsulosin 0.4 mg oral capsule: 1 cap(s) orally once a day (02 Mar 2023 19:45)            MEDICATIONS  (STANDING):  acetaminophen     Tablet .. 650 milliGRAM(s) Oral every 6 hours  aMIOdarone    Tablet 400 milliGRAM(s) Oral two times a day  ascorbic acid 500 milliGRAM(s) Oral two times a day  aspirin enteric coated 81 milliGRAM(s) Oral daily  atorvastatin 80 milliGRAM(s) Oral at bedtime  bisacodyl Suppository 10 milliGRAM(s) Rectal once  cefuroxime  IVPB 1500 milliGRAM(s) IV Intermittent every 8 hours  chlorhexidine 2% Cloths 1 Application(s) Topical daily  dorzolamide 2% Ophthalmic Solution 1 Drop(s) Both EYES <User Schedule>  enoxaparin Injectable 40 milliGRAM(s) SubCutaneous every 24 hours  gabapentin 100 milliGRAM(s) Oral every 8 hours  insulin glargine Injectable (LANTUS) 15 Unit(s) SubCutaneous at bedtime  insulin lispro (ADMELOG) corrective regimen sliding scale   SubCutaneous three times a day before meals  insulin lispro (ADMELOG) corrective regimen sliding scale   SubCutaneous at bedtime  insulin lispro Injectable (ADMELOG) 5 Unit(s) SubCutaneous three times a day before meals  montelukast 10 milliGRAM(s) Oral daily  pantoprazole    Tablet 40 milliGRAM(s) Oral before breakfast  polyethylene glycol 3350 17 Gram(s) Oral daily  potassium chloride    Tablet ER 20 milliEquivalent(s) Oral daily  senna 2 Tablet(s) Oral at bedtime  sodium chloride 0.9%. 1000 milliLiter(s) (10 mL/Hr) IV Continuous <Continuous>  tamsulosin 0.4 milliGRAM(s) Oral at bedtime    MEDICATIONS  (PRN):  HYDROmorphone  Injectable 0.5 milliGRAM(s) IV Push every 6 hours PRN Breakthrough Pain  oxyCODONE    IR 5 milliGRAM(s) Oral every 4 hours PRN Moderate Pain (4 - 6)  oxyCODONE    IR 10 milliGRAM(s) Oral every 4 hours PRN Severe Pain (7 - 10)      Allergies    Coumadin (Other (Mod to Severe); Flushing)    Intolerances        Review of Systems:  Neuro: No HA, no dizziness  Cardiovascular: No chest pain, no palpitations  Respiratory: no SOB, no cough  GI: No nausea, vomiting, abdominal pain  MSK: Denies joint/muscle pain      ALL OTHER SYSTEMS REVIEWED AND NEGATIVE      PHYSICAL EXAM:  VITALS: T(C): 36.7 (03-09-23 @ 11:02)  T(F): 98 (03-09-23 @ 11:02), Max: 100.8 (03-08-23 @ 16:00)  HR: 74 (03-09-23 @ 14:00) (71 - 88)  BP: 134/65 (03-09-23 @ 14:00) (108/51 - 144/63)  RR:  (18 - 27)  SpO2:  (92% - 100%)  Wt(kg): --  GENERAL: NAD, well-groomed, well-developed  NEURO:  alert and oriented  RESPIRATORY: Clear to auscultation bilaterally; No rales, rhonchi, wheezing  CARDIOVASCULAR: Si S2  GI: Soft, non distended, normal bowel sounds  MUSCULOSKELETAL: Moves all extremities equally       POCT Blood Glucose.: 157 mg/dL (03-09-23 @ 11:28)  POCT Blood Glucose.: 131 mg/dL (03-09-23 @ 07:16)  POCT Blood Glucose.: 138 mg/dL (03-09-23 @ 06:07)  POCT Blood Glucose.: 123 mg/dL (03-09-23 @ 04:26)  POCT Blood Glucose.: 121 mg/dL (03-09-23 @ 03:35)  POCT Blood Glucose.: 91 mg/dL (03-09-23 @ 02:52)  POCT Blood Glucose.: 120 mg/dL (03-09-23 @ 01:14)  POCT Blood Glucose.: 124 mg/dL (03-08-23 @ 23:57)  POCT Blood Glucose.: 120 mg/dL (03-08-23 @ 23:06)  POCT Blood Glucose.: 138 mg/dL (03-08-23 @ 21:59)  POCT Blood Glucose.: 128 mg/dL (03-08-23 @ 20:55)  POCT Blood Glucose.: 131 mg/dL (03-08-23 @ 19:58)  POCT Blood Glucose.: 156 mg/dL (03-08-23 @ 19:00)  POCT Blood Glucose.: 177 mg/dL (03-08-23 @ 17:59)  POCT Blood Glucose.: 186 mg/dL (03-08-23 @ 17:31)  POCT Blood Glucose.: 160 mg/dL (03-08-23 @ 16:24)  POCT Blood Glucose.: 121 mg/dL (03-08-23 @ 15:06)  POCT Blood Glucose.: 120 mg/dL (03-08-23 @ 14:07)  POCT Blood Glucose.: 185 mg/dL (03-08-23 @ 13:00)  POCT Blood Glucose.: 195 mg/dL (03-08-23 @ 11:49)  POCT Blood Glucose.: 129 mg/dL (03-08-23 @ 08:54)  POCT Blood Glucose.: 120 mg/dL (03-08-23 @ 06:47)  POCT Blood Glucose.: 122 mg/dL (03-08-23 @ 05:57)  POCT Blood Glucose.: 130 mg/dL (03-08-23 @ 04:48)  POCT Blood Glucose.: 133 mg/dL (03-08-23 @ 03:46)  POCT Blood Glucose.: 135 mg/dL (03-08-23 @ 02:55)  POCT Blood Glucose.: 99 mg/dL (03-08-23 @ 01:47)  POCT Blood Glucose.: 111 mg/dL (03-08-23 @ 00:46)  POCT Blood Glucose.: 122 mg/dL (03-07-23 @ 23:59)  POCT Blood Glucose.: 104 mg/dL (03-07-23 @ 23:07)  POCT Blood Glucose.: 105 mg/dL (03-07-23 @ 20:51)  POCT Blood Glucose.: 114 mg/dL (03-07-23 @ 20:02)  POCT Blood Glucose.: 122 mg/dL (03-07-23 @ 19:01)  POCT Blood Glucose.: 132 mg/dL (03-07-23 @ 17:58)  POCT Blood Glucose.: 130 mg/dL (03-07-23 @ 17:02)  POCT Blood Glucose.: 137 mg/dL (03-07-23 @ 15:53)  POCT Blood Glucose.: 134 mg/dL (03-07-23 @ 14:57)  POCT Blood Glucose.: 156 mg/dL (03-07-23 @ 14:05)  POCT Blood Glucose.: 164 mg/dL (03-07-23 @ 13:39)  POCT Blood Glucose.: 141 mg/dL (03-06-23 @ 21:07)  POCT Blood Glucose.: 102 mg/dL (03-06-23 @ 17:27)                            8.8    9.74  )-----------( 111      ( 09 Mar 2023 05:21 )             26.5       03-09    137  |  103  |  16  ----------------------------<  127<H>  4.2   |  25  |  0.84    eGFR: 92    Ca    7.9<L>      03-09  Mg     1.9     03-09  Phos  3.4     03-09    TPro  5.3<L>  /  Alb  3.3  /  TBili  0.6  /  DBili  x   /  AST  30  /  ALT  18  /  AlkPhos  46  03-09      Thyroid Function Tests:  03-05 @ 07:14 TSH 6.63 FreeT4 -- T3 102 Anti TPO -- Anti Thyroglobulin Ab -- TSI --  03-03 @ 05:59 TSH 5.09 FreeT4 1.1 T3 -- Anti TPO -- Anti Thyroglobulin Ab -- TSI --    Diet, Consistent Carbohydrate/No Snacks (03-08-23 @ 07:55) [Active]          A1C with Estimated Average Glucose Result: 6.0 % (03-03-23 @ 05:57)  A1C with Estimated Average Glucose Result: 6.1 % (03-02-23 @ 19:19)                 HPI:  71 yo Man with PMHx of HTN, HLD, DMT2 (last A1C 6.2) presents for cardiac cath. pt reports he has been feeling intermittent SOB and dizziness, evaluated by Dr. Vieira and had abnormal cardiac CTA after having abnormal TTE and exercise stress test. He denies chest pain, palpitation or SOB currently.     Card: Dr. Lai Vieira    Cardiac CT 2/22/23  Calcium score 1262  mLAD 70% large focal calcified plaque with possible severe stenosis, OM1 50-69%    TTE on 1/25 EF 55-60%, mild AS  Exercise ST in 1/27: no sig ST abnormalities        Patient has history of pre diabetes, A1C  6.0 % , on home metformin.   Endo was consulted for glycemic control.      PAST MEDICAL & SURGICAL HISTORY:  Hip pain, right      Hypertension      Glaucoma      Retinal detachments and defects  bilateral      Macular degeneration of both eyes      DM (diabetes mellitus)      S/P hip replacement, left  2006      Recent retinal detachment, total, bilateral  1989 (left), 2006 (right)      S/P appendectomy      H/O total hip arthroplasty, right  Right hip replacement 2010          FAMILY HISTORY:  Family history of breast cancer in mother (Mother)    FH: pancreatic cancer (Father)        Social History:            HOME MEDICATIONS:  Home Medications:  Aspirin Enteric Coated 81 mg oral delayed release tablet: 1 tab(s) orally once a day (02 Mar 2023 19:45)  atorvastatin 10 mg oral tablet: 1 tab(s) orally once a day (02 Mar 2023 19:45)  cilostazol 50 mg oral tablet: 1 tab(s) orally 2 times a day (02 Mar 2023 19:45)  dorzolamide 2% ophthalmic solution: 1 drop(s) to each affected eye 2 times a day (02 Mar 2023 19:45)  metFORMIN 500 mg oral tablet, extended release: 1 tab(s) orally once a day (02 Mar 2023 19:45)  montelukast 10 mg oral tablet: 1 tab(s) orally once a day (02 Mar 2023 19:45)  ramipril 2.5 mg oral capsule: 1 cap(s) orally once a day (02 Mar 2023 19:45)  tamsulosin 0.4 mg oral capsule: 1 cap(s) orally once a day (02 Mar 2023 19:45)            MEDICATIONS  (STANDING):  acetaminophen     Tablet .. 650 milliGRAM(s) Oral every 6 hours  aMIOdarone    Tablet 400 milliGRAM(s) Oral two times a day  ascorbic acid 500 milliGRAM(s) Oral two times a day  aspirin enteric coated 81 milliGRAM(s) Oral daily  atorvastatin 80 milliGRAM(s) Oral at bedtime  bisacodyl Suppository 10 milliGRAM(s) Rectal once  cefuroxime  IVPB 1500 milliGRAM(s) IV Intermittent every 8 hours  chlorhexidine 2% Cloths 1 Application(s) Topical daily  dorzolamide 2% Ophthalmic Solution 1 Drop(s) Both EYES <User Schedule>  enoxaparin Injectable 40 milliGRAM(s) SubCutaneous every 24 hours  gabapentin 100 milliGRAM(s) Oral every 8 hours  insulin glargine Injectable (LANTUS) 15 Unit(s) SubCutaneous at bedtime  insulin lispro (ADMELOG) corrective regimen sliding scale   SubCutaneous three times a day before meals  insulin lispro (ADMELOG) corrective regimen sliding scale   SubCutaneous at bedtime  insulin lispro Injectable (ADMELOG) 5 Unit(s) SubCutaneous three times a day before meals  montelukast 10 milliGRAM(s) Oral daily  pantoprazole    Tablet 40 milliGRAM(s) Oral before breakfast  polyethylene glycol 3350 17 Gram(s) Oral daily  potassium chloride    Tablet ER 20 milliEquivalent(s) Oral daily  senna 2 Tablet(s) Oral at bedtime  sodium chloride 0.9%. 1000 milliLiter(s) (10 mL/Hr) IV Continuous <Continuous>  tamsulosin 0.4 milliGRAM(s) Oral at bedtime    MEDICATIONS  (PRN):  HYDROmorphone  Injectable 0.5 milliGRAM(s) IV Push every 6 hours PRN Breakthrough Pain  oxyCODONE    IR 5 milliGRAM(s) Oral every 4 hours PRN Moderate Pain (4 - 6)  oxyCODONE    IR 10 milliGRAM(s) Oral every 4 hours PRN Severe Pain (7 - 10)      Allergies    Coumadin (Other (Mod to Severe); Flushing)    Intolerances        Review of Systems:  Neuro: No HA, no dizziness  Cardiovascular: No chest pain, no palpitations  Respiratory: no SOB, no cough  GI: No nausea, vomiting, abdominal pain  MSK: Denies joint/muscle pain      ALL OTHER SYSTEMS REVIEWED AND NEGATIVE      PHYSICAL EXAM:  VITALS: T(C): 36.7 (03-09-23 @ 11:02)  T(F): 98 (03-09-23 @ 11:02), Max: 100.8 (03-08-23 @ 16:00)  HR: 74 (03-09-23 @ 14:00) (71 - 88)  BP: 134/65 (03-09-23 @ 14:00) (108/51 - 144/63)  RR:  (18 - 27)  SpO2:  (92% - 100%)  Wt(kg): --  GENERAL: NAD, well-groomed, well-developed  NEURO:  alert and oriented  RESPIRATORY: Clear to auscultation bilaterally; No rales, rhonchi, wheezing  CARDIOVASCULAR: Si S2  GI: Soft, non distended, normal bowel sounds  MUSCULOSKELETAL: Moves all extremities equally       POCT Blood Glucose.: 157 mg/dL (03-09-23 @ 11:28)  POCT Blood Glucose.: 131 mg/dL (03-09-23 @ 07:16)  POCT Blood Glucose.: 138 mg/dL (03-09-23 @ 06:07)  POCT Blood Glucose.: 123 mg/dL (03-09-23 @ 04:26)  POCT Blood Glucose.: 121 mg/dL (03-09-23 @ 03:35)  POCT Blood Glucose.: 91 mg/dL (03-09-23 @ 02:52)  POCT Blood Glucose.: 120 mg/dL (03-09-23 @ 01:14)  POCT Blood Glucose.: 124 mg/dL (03-08-23 @ 23:57)  POCT Blood Glucose.: 120 mg/dL (03-08-23 @ 23:06)  POCT Blood Glucose.: 138 mg/dL (03-08-23 @ 21:59)  POCT Blood Glucose.: 128 mg/dL (03-08-23 @ 20:55)  POCT Blood Glucose.: 131 mg/dL (03-08-23 @ 19:58)  POCT Blood Glucose.: 156 mg/dL (03-08-23 @ 19:00)  POCT Blood Glucose.: 177 mg/dL (03-08-23 @ 17:59)  POCT Blood Glucose.: 186 mg/dL (03-08-23 @ 17:31)  POCT Blood Glucose.: 160 mg/dL (03-08-23 @ 16:24)  POCT Blood Glucose.: 121 mg/dL (03-08-23 @ 15:06)  POCT Blood Glucose.: 120 mg/dL (03-08-23 @ 14:07)  POCT Blood Glucose.: 185 mg/dL (03-08-23 @ 13:00)  POCT Blood Glucose.: 195 mg/dL (03-08-23 @ 11:49)  POCT Blood Glucose.: 129 mg/dL (03-08-23 @ 08:54)  POCT Blood Glucose.: 120 mg/dL (03-08-23 @ 06:47)  POCT Blood Glucose.: 122 mg/dL (03-08-23 @ 05:57)  POCT Blood Glucose.: 130 mg/dL (03-08-23 @ 04:48)  POCT Blood Glucose.: 133 mg/dL (03-08-23 @ 03:46)  POCT Blood Glucose.: 135 mg/dL (03-08-23 @ 02:55)  POCT Blood Glucose.: 99 mg/dL (03-08-23 @ 01:47)  POCT Blood Glucose.: 111 mg/dL (03-08-23 @ 00:46)  POCT Blood Glucose.: 122 mg/dL (03-07-23 @ 23:59)  POCT Blood Glucose.: 104 mg/dL (03-07-23 @ 23:07)  POCT Blood Glucose.: 105 mg/dL (03-07-23 @ 20:51)  POCT Blood Glucose.: 114 mg/dL (03-07-23 @ 20:02)  POCT Blood Glucose.: 122 mg/dL (03-07-23 @ 19:01)  POCT Blood Glucose.: 132 mg/dL (03-07-23 @ 17:58)  POCT Blood Glucose.: 130 mg/dL (03-07-23 @ 17:02)  POCT Blood Glucose.: 137 mg/dL (03-07-23 @ 15:53)  POCT Blood Glucose.: 134 mg/dL (03-07-23 @ 14:57)  POCT Blood Glucose.: 156 mg/dL (03-07-23 @ 14:05)  POCT Blood Glucose.: 164 mg/dL (03-07-23 @ 13:39)  POCT Blood Glucose.: 141 mg/dL (03-06-23 @ 21:07)  POCT Blood Glucose.: 102 mg/dL (03-06-23 @ 17:27)                            8.8    9.74  )-----------( 111      ( 09 Mar 2023 05:21 )             26.5       03-09    137  |  103  |  16  ----------------------------<  127<H>  4.2   |  25  |  0.84    eGFR: 92    Ca    7.9<L>      03-09  Mg     1.9     03-09  Phos  3.4     03-09    TPro  5.3<L>  /  Alb  3.3  /  TBili  0.6  /  DBili  x   /  AST  30  /  ALT  18  /  AlkPhos  46  03-09      Thyroid Function Tests:  03-05 @ 07:14 TSH 6.63 FreeT4 -- T3 102 Anti TPO -- Anti Thyroglobulin Ab -- TSI --  03-03 @ 05:59 TSH 5.09 FreeT4 1.1 T3 -- Anti TPO -- Anti Thyroglobulin Ab -- TSI --    Diet, Consistent Carbohydrate/No Snacks (03-08-23 @ 07:55) [Active]          A1C with Estimated Average Glucose Result: 6.0 % (03-03-23 @ 05:57)  A1C with Estimated Average Glucose Result: 6.1 % (03-02-23 @ 19:19)

## 2023-03-09 NOTE — PROVIDER CONTACT NOTE (OTHER) - ASSESSMENT
Pt ambulating in [place] with RNx3. Pt assisted to chair. PEA arrest. Code blue called. CPR started, pt transferred to bed, defib pads on patient, emergency equipment, CTSx & ICU team in room. See code sheet.

## 2023-03-10 LAB
ALBUMIN SERPL ELPH-MCNC: 3.4 G/DL — SIGNIFICANT CHANGE UP (ref 3.3–5)
ALP SERPL-CCNC: 53 U/L — SIGNIFICANT CHANGE UP (ref 40–120)
ALT FLD-CCNC: 20 U/L — SIGNIFICANT CHANGE UP (ref 10–45)
ANION GAP SERPL CALC-SCNC: 9 MMOL/L — SIGNIFICANT CHANGE UP (ref 5–17)
AST SERPL-CCNC: 27 U/L — SIGNIFICANT CHANGE UP (ref 10–40)
BILIRUB SERPL-MCNC: 0.6 MG/DL — SIGNIFICANT CHANGE UP (ref 0.2–1.2)
BLD GP AB SCN SERPL QL: NEGATIVE — SIGNIFICANT CHANGE UP
BUN SERPL-MCNC: 21 MG/DL — SIGNIFICANT CHANGE UP (ref 7–23)
CALCIUM SERPL-MCNC: 7.9 MG/DL — LOW (ref 8.4–10.5)
CHLORIDE SERPL-SCNC: 101 MMOL/L — SIGNIFICANT CHANGE UP (ref 96–108)
CO2 SERPL-SCNC: 25 MMOL/L — SIGNIFICANT CHANGE UP (ref 22–31)
CREAT SERPL-MCNC: 1 MG/DL — SIGNIFICANT CHANGE UP (ref 0.5–1.3)
EGFR: 79 ML/MIN/1.73M2 — SIGNIFICANT CHANGE UP
GLUCOSE BLDC GLUCOMTR-MCNC: 123 MG/DL — HIGH (ref 70–99)
GLUCOSE BLDC GLUCOMTR-MCNC: 129 MG/DL — HIGH (ref 70–99)
GLUCOSE BLDC GLUCOMTR-MCNC: 133 MG/DL — HIGH (ref 70–99)
GLUCOSE BLDC GLUCOMTR-MCNC: 156 MG/DL — HIGH (ref 70–99)
GLUCOSE SERPL-MCNC: 146 MG/DL — HIGH (ref 70–99)
HCT VFR BLD CALC: 29 % — LOW (ref 39–50)
HGB BLD-MCNC: 9.2 G/DL — LOW (ref 13–17)
MAGNESIUM SERPL-MCNC: 2.2 MG/DL — SIGNIFICANT CHANGE UP (ref 1.6–2.6)
MCHC RBC-ENTMCNC: 28.6 PG — SIGNIFICANT CHANGE UP (ref 27–34)
MCHC RBC-ENTMCNC: 31.7 GM/DL — LOW (ref 32–36)
MCV RBC AUTO: 90.1 FL — SIGNIFICANT CHANGE UP (ref 80–100)
NRBC # BLD: 0 /100 WBCS — SIGNIFICANT CHANGE UP (ref 0–0)
PHOSPHATE SERPL-MCNC: 2.7 MG/DL — SIGNIFICANT CHANGE UP (ref 2.5–4.5)
PLATELET # BLD AUTO: 135 K/UL — LOW (ref 150–400)
POTASSIUM SERPL-MCNC: 4.5 MMOL/L — SIGNIFICANT CHANGE UP (ref 3.5–5.3)
POTASSIUM SERPL-SCNC: 4.5 MMOL/L — SIGNIFICANT CHANGE UP (ref 3.5–5.3)
PROT SERPL-MCNC: 5.8 G/DL — LOW (ref 6–8.3)
RBC # BLD: 3.22 M/UL — LOW (ref 4.2–5.8)
RBC # FLD: 14.4 % — SIGNIFICANT CHANGE UP (ref 10.3–14.5)
RH IG SCN BLD-IMP: POSITIVE — SIGNIFICANT CHANGE UP
SODIUM SERPL-SCNC: 135 MMOL/L — SIGNIFICANT CHANGE UP (ref 135–145)
WBC # BLD: 12.27 K/UL — HIGH (ref 3.8–10.5)
WBC # FLD AUTO: 12.27 K/UL — HIGH (ref 3.8–10.5)

## 2023-03-10 PROCEDURE — 71045 X-RAY EXAM CHEST 1 VIEW: CPT | Mod: 26

## 2023-03-10 PROCEDURE — 93010 ELECTROCARDIOGRAM REPORT: CPT

## 2023-03-10 RX ADMIN — Medication 650 MILLIGRAM(S): at 05:44

## 2023-03-10 RX ADMIN — ATORVASTATIN CALCIUM 80 MILLIGRAM(S): 80 TABLET, FILM COATED ORAL at 21:39

## 2023-03-10 RX ADMIN — DORZOLAMIDE HYDROCHLORIDE 1 DROP(S): 20 SOLUTION/ DROPS OPHTHALMIC at 12:42

## 2023-03-10 RX ADMIN — Medication 650 MILLIGRAM(S): at 17:54

## 2023-03-10 RX ADMIN — DORZOLAMIDE HYDROCHLORIDE 1 DROP(S): 20 SOLUTION/ DROPS OPHTHALMIC at 21:39

## 2023-03-10 RX ADMIN — Medication 650 MILLIGRAM(S): at 06:13

## 2023-03-10 RX ADMIN — Medication 500 MILLIGRAM(S): at 05:44

## 2023-03-10 RX ADMIN — SENNA PLUS 2 TABLET(S): 8.6 TABLET ORAL at 21:39

## 2023-03-10 RX ADMIN — Medication 500 MILLIGRAM(S): at 17:54

## 2023-03-10 RX ADMIN — PANTOPRAZOLE SODIUM 40 MILLIGRAM(S): 20 TABLET, DELAYED RELEASE ORAL at 06:29

## 2023-03-10 RX ADMIN — Medication 650 MILLIGRAM(S): at 18:34

## 2023-03-10 RX ADMIN — CHLORHEXIDINE GLUCONATE 1 APPLICATION(S): 213 SOLUTION TOPICAL at 11:49

## 2023-03-10 RX ADMIN — MONTELUKAST 10 MILLIGRAM(S): 4 TABLET, CHEWABLE ORAL at 11:18

## 2023-03-10 RX ADMIN — GABAPENTIN 100 MILLIGRAM(S): 400 CAPSULE ORAL at 21:38

## 2023-03-10 RX ADMIN — GABAPENTIN 100 MILLIGRAM(S): 400 CAPSULE ORAL at 05:44

## 2023-03-10 RX ADMIN — Medication 5 UNIT(S): at 06:28

## 2023-03-10 RX ADMIN — INSULIN GLARGINE 15 UNIT(S): 100 INJECTION, SOLUTION SUBCUTANEOUS at 21:39

## 2023-03-10 RX ADMIN — Medication 5 UNIT(S): at 16:35

## 2023-03-10 RX ADMIN — Medication 650 MILLIGRAM(S): at 11:17

## 2023-03-10 RX ADMIN — Medication 650 MILLIGRAM(S): at 11:49

## 2023-03-10 RX ADMIN — Medication 1: at 06:28

## 2023-03-10 RX ADMIN — TAMSULOSIN HYDROCHLORIDE 0.4 MILLIGRAM(S): 0.4 CAPSULE ORAL at 21:38

## 2023-03-10 RX ADMIN — Medication 5 UNIT(S): at 11:47

## 2023-03-10 RX ADMIN — Medication 650 MILLIGRAM(S): at 00:40

## 2023-03-10 RX ADMIN — GABAPENTIN 100 MILLIGRAM(S): 400 CAPSULE ORAL at 13:18

## 2023-03-10 RX ADMIN — ENOXAPARIN SODIUM 40 MILLIGRAM(S): 100 INJECTION SUBCUTANEOUS at 13:54

## 2023-03-10 RX ADMIN — Medication 81 MILLIGRAM(S): at 11:18

## 2023-03-10 RX ADMIN — POLYETHYLENE GLYCOL 3350 17 GRAM(S): 17 POWDER, FOR SOLUTION ORAL at 11:18

## 2023-03-10 RX ADMIN — Medication 650 MILLIGRAM(S): at 02:00

## 2023-03-10 NOTE — PROGRESS NOTE ADULT - SUBJECTIVE AND OBJECTIVE BOX
Chief complaint  Patient is a 72y old  Male who presents with a chief complaint of cardiac surgery (09 Mar 2023 14:38)         Labs and Fingersticks  CAPILLARY BLOOD GLUCOSE      POCT Blood Glucose.: 156 mg/dL (10 Mar 2023 06:26)  POCT Blood Glucose.: 143 mg/dL (09 Mar 2023 21:25)  POCT Blood Glucose.: 154 mg/dL (09 Mar 2023 17:32)  POCT Blood Glucose.: 157 mg/dL (09 Mar 2023 11:28)      Anion Gap, Serum: 9 (03-10 @ 00:25)  Anion Gap, Serum: 13 (03-09 @ 14:35)  Anion Gap, Serum: 9 (03-09 @ 05:20)      Calcium, Total Serum: 7.9 *L* (03-10 @ 00:25)  Calcium, Total Serum: 8.0 *L* (03-09 @ 14:35)  Calcium, Total Serum: 7.9 *L* (03-09 @ 05:20)  Albumin, Serum: 3.4 (03-10 @ 00:25)  Albumin, Serum: 3.8 (03-09 @ 14:35)  Albumin, Serum: 3.3 (03-09 @ 05:20)    Alanine Aminotransferase (ALT/SGPT): 20 (03-10 @ 00:25)  Alanine Aminotransferase (ALT/SGPT): 23 (03-09 @ 14:35)  Alanine Aminotransferase (ALT/SGPT): 18 (03-09 @ 05:20)  Alkaline Phosphatase, Serum: 53 (03-10 @ 00:25)  Alkaline Phosphatase, Serum: 51 (03-09 @ 14:35)  Alkaline Phosphatase, Serum: 46 (03-09 @ 05:20)  Aspartate Aminotransferase (AST/SGOT): 27 (03-10 @ 00:25)  Aspartate Aminotransferase (AST/SGOT): 32 (03-09 @ 14:35)  Aspartate Aminotransferase (AST/SGOT): 30 (03-09 @ 05:20)        03-10    135  |  101  |  21  ----------------------------<  146<H>  4.5   |  25  |  1.00    Ca    7.9<L>      10 Mar 2023 00:25  Phos  2.7     03-10  Mg     2.2     03-10    TPro  5.8<L>  /  Alb  3.4  /  TBili  0.6  /  DBili  x   /  AST  27  /  ALT  20  /  AlkPhos  53  03-10                        9.2    12.27 )-----------( 135      ( 10 Mar 2023 00:26 )             29.0     Medications  MEDICATIONS  (STANDING):  acetaminophen     Tablet .. 650 milliGRAM(s) Oral every 6 hours  ascorbic acid 500 milliGRAM(s) Oral two times a day  aspirin enteric coated 81 milliGRAM(s) Oral daily  atorvastatin 80 milliGRAM(s) Oral at bedtime  bisacodyl Suppository 10 milliGRAM(s) Rectal once  chlorhexidine 2% Cloths 1 Application(s) Topical daily  dorzolamide 2% Ophthalmic Solution 1 Drop(s) Both EYES <User Schedule>  enoxaparin Injectable 40 milliGRAM(s) SubCutaneous every 24 hours  gabapentin 100 milliGRAM(s) Oral every 8 hours  insulin glargine Injectable (LANTUS) 15 Unit(s) SubCutaneous at bedtime  insulin lispro (ADMELOG) corrective regimen sliding scale   SubCutaneous three times a day before meals  insulin lispro (ADMELOG) corrective regimen sliding scale   SubCutaneous at bedtime  insulin lispro Injectable (ADMELOG) 5 Unit(s) SubCutaneous three times a day before meals  montelukast 10 milliGRAM(s) Oral daily  pantoprazole    Tablet 40 milliGRAM(s) Oral before breakfast  polyethylene glycol 3350 17 Gram(s) Oral daily  senna 2 Tablet(s) Oral at bedtime  sodium chloride 0.9%. 1000 milliLiter(s) (10 mL/Hr) IV Continuous <Continuous>  tamsulosin 0.4 milliGRAM(s) Oral at bedtime      Physical Exam  General: Patient comfortable in  chair  Vital Signs Last 12 Hrs  T(F): 98.3 (03-10-23 @ 08:00), Max: 99.6 (03-10-23 @ 00:00)  HR: 90 (03-10-23 @ 09:00) (80 - 90)  BP: 103/50 (03-10-23 @ 09:00) (103/50 - 138/65)  BP(mean): 70 (03-10-23 @ 09:00) (70 - 93)  RR: 28 (03-10-23 @ 09:00) (21 - 35)  SpO2: 95% (03-10-23 @ 09:00) (91% - 97%)    CVS: S1S2   Respiratory: No wheezing, no crepitations  GI: Abdomen soft, bowel sounds positive  Musculoskeletal:  moves all extremities  : Voiding          Chief complaint  Patient is a 72y old  Male who presents with a chief complaint of cardiac surgery (09 Mar 2023 14:38)    Labs and Fingersticks  CAPILLARY BLOOD GLUCOSE      POCT Blood Glucose.: 156 mg/dL (10 Mar 2023 06:26)  POCT Blood Glucose.: 143 mg/dL (09 Mar 2023 21:25)  POCT Blood Glucose.: 154 mg/dL (09 Mar 2023 17:32)  POCT Blood Glucose.: 157 mg/dL (09 Mar 2023 11:28)      Anion Gap, Serum: 9 (03-10 @ 00:25)  Anion Gap, Serum: 13 (03-09 @ 14:35)  Anion Gap, Serum: 9 (03-09 @ 05:20)      Calcium, Total Serum: 7.9 *L* (03-10 @ 00:25)  Calcium, Total Serum: 8.0 *L* (03-09 @ 14:35)  Calcium, Total Serum: 7.9 *L* (03-09 @ 05:20)  Albumin, Serum: 3.4 (03-10 @ 00:25)  Albumin, Serum: 3.8 (03-09 @ 14:35)  Albumin, Serum: 3.3 (03-09 @ 05:20)    Alanine Aminotransferase (ALT/SGPT): 20 (03-10 @ 00:25)  Alanine Aminotransferase (ALT/SGPT): 23 (03-09 @ 14:35)  Alanine Aminotransferase (ALT/SGPT): 18 (03-09 @ 05:20)  Alkaline Phosphatase, Serum: 53 (03-10 @ 00:25)  Alkaline Phosphatase, Serum: 51 (03-09 @ 14:35)  Alkaline Phosphatase, Serum: 46 (03-09 @ 05:20)  Aspartate Aminotransferase (AST/SGOT): 27 (03-10 @ 00:25)  Aspartate Aminotransferase (AST/SGOT): 32 (03-09 @ 14:35)  Aspartate Aminotransferase (AST/SGOT): 30 (03-09 @ 05:20)        03-10    135  |  101  |  21  ----------------------------<  146<H>  4.5   |  25  |  1.00    Ca    7.9<L>      10 Mar 2023 00:25  Phos  2.7     03-10  Mg     2.2     03-10    TPro  5.8<L>  /  Alb  3.4  /  TBili  0.6  /  DBili  x   /  AST  27  /  ALT  20  /  AlkPhos  53  03-10                        9.2    12.27 )-----------( 135      ( 10 Mar 2023 00:26 )             29.0     Medications  MEDICATIONS  (STANDING):  acetaminophen     Tablet .. 650 milliGRAM(s) Oral every 6 hours  ascorbic acid 500 milliGRAM(s) Oral two times a day  aspirin enteric coated 81 milliGRAM(s) Oral daily  atorvastatin 80 milliGRAM(s) Oral at bedtime  bisacodyl Suppository 10 milliGRAM(s) Rectal once  chlorhexidine 2% Cloths 1 Application(s) Topical daily  dorzolamide 2% Ophthalmic Solution 1 Drop(s) Both EYES <User Schedule>  enoxaparin Injectable 40 milliGRAM(s) SubCutaneous every 24 hours  gabapentin 100 milliGRAM(s) Oral every 8 hours  insulin glargine Injectable (LANTUS) 15 Unit(s) SubCutaneous at bedtime  insulin lispro (ADMELOG) corrective regimen sliding scale   SubCutaneous three times a day before meals  insulin lispro (ADMELOG) corrective regimen sliding scale   SubCutaneous at bedtime  insulin lispro Injectable (ADMELOG) 5 Unit(s) SubCutaneous three times a day before meals  montelukast 10 milliGRAM(s) Oral daily  pantoprazole    Tablet 40 milliGRAM(s) Oral before breakfast  polyethylene glycol 3350 17 Gram(s) Oral daily  senna 2 Tablet(s) Oral at bedtime  sodium chloride 0.9%. 1000 milliLiter(s) (10 mL/Hr) IV Continuous <Continuous>  tamsulosin 0.4 milliGRAM(s) Oral at bedtime      Physical Exam  General: Patient comfortable in  chair  Vital Signs Last 12 Hrs  T(F): 98.3 (03-10-23 @ 08:00), Max: 99.6 (03-10-23 @ 00:00)  HR: 90 (03-10-23 @ 09:00) (80 - 90)  BP: 103/50 (03-10-23 @ 09:00) (103/50 - 138/65)  BP(mean): 70 (03-10-23 @ 09:00) (70 - 93)  RR: 28 (03-10-23 @ 09:00) (21 - 35)  SpO2: 95% (03-10-23 @ 09:00) (91% - 97%)    CVS: S1S2   Respiratory: No wheezing, no crepitations  GI: Abdomen soft, bowel sounds positive  Musculoskeletal:  moves all extremities  : Voiding

## 2023-03-10 NOTE — PROGRESS NOTE ADULT - ASSESSMENT
71 yo Man with PMHx of HTN, HLD, DMT2 (last A1C 6.2) presents for cardiac cath. pt reports he has been feeling intermittent SOB and dizziness, evaluated by Dr. Vieira and had abnormal cardiac CTA after having abnormal TTE and exercise stress test. He denies chest pain, palpitation or SOB currently.     Pt now s/p cardiac cath on 3/2/23 revealing triple vessel disease (mLAD 90%, distal Cx 80%, mRCA 80%). Ct surgery consulted for CABG eval with Dr. Torres.  likely plan for OR either Tuesday or Thursday.    3/3 VSS overnight. HR/BP stable on Lopressor 12.5mg BID. No CP/SOB.  Pending TTE today.  P2y12 187 this AM.  Repeat TSH 5 however remainder of Thyroid panel normal.  UA and MRSA PCR ordered   3/4 VSS no chest pain OR tuesday. Continue preop work up  3/5 VSS CP free OR Tuesday  3/6 Preop OR tomorrow  3/7 S/P C3L, extubated Intra-Op with intrathecal morphine given. RTOR for bleeding. 2u PRBC post op.  3/8 Transfer to SDU from CTU, + RIJ in place, + PW, + MED CTx1, + LP CT x1 , monitor drainage, + Casiano in place,   3/9 VSS D/c IJ d/c  insulin gtt d/c casiano diuresis with lasix 40 bid maintain chest tube med 360 left pleura 425 initiate  Lopressor 25  BID- Return to CTU for ?PEA ARREST?  3/10 Moves back out to floor. VSS; LP chest tube in place. Off diuretics, BB on Hold.

## 2023-03-10 NOTE — PROGRESS NOTE ADULT - SUBJECTIVE AND OBJECTIVE BOX
VITAL SIGNS    Telemetry:  SR 88  Vital Signs Last 24 Hrs  T(C): 37.1 (03-10-23 @ 10:12), Max: 38.3 (23 @ 19:00)  T(F): 98.8 (03-10-23 @ 10:12), Max: 100.9 (23 @ 19:00)  HR: 92 (03-10-23 @ 10:45) (75 - 92)  BP: 130/70 (03-10-23 @ 10:45) (103/50 - 138/65)  RR: 18 (03-10-23 @ 10:16) (18 - 35)  SpO2: 98% (03-10-23 @ 10:45) (91% - 99%)             @ 07:01  -  03-10 @ 07:00  --------------------------------------------------------  IN: 1026 mL / OUT: 1700 mL / NET: -674 mL    03-10 @ 07:01  -  03-10 @ 15:45  --------------------------------------------------------  IN: 200 mL / OUT: 250 mL / NET: -50 mL       Daily     Daily Weight in k.2 (10 Mar 2023 00:00)  Admit Wt: Drug Dosing Weight  Height (cm): 177.8 (07 Mar 2023 21:43)  Weight (kg): 93 (07 Mar 2023 21:43)  BMI (kg/m2): 29.4 (07 Mar 2023 21:43)  BSA (m2): 2.11 (07 Mar 2023 21:43)    Bilirubin Total, Serum: 0.6 mg/dL (03-10 @ 00:25)    CAPILLARY BLOOD GLUCOSE      POCT Blood Glucose.: 133 mg/dL (10 Mar 2023 11:37)  POCT Blood Glucose.: 156 mg/dL (10 Mar 2023 06:26)  POCT Blood Glucose.: 143 mg/dL (09 Mar 2023 21:25)  POCT Blood Glucose.: 154 mg/dL (09 Mar 2023 17:32)          MEDICATIONS  acetaminophen     Tablet .. 650 milliGRAM(s) Oral every 6 hours  acetaminophen     Tablet .. 650 milliGRAM(s) Oral every 6 hours PRN  ascorbic acid 500 milliGRAM(s) Oral two times a day  aspirin enteric coated 81 milliGRAM(s) Oral daily  atorvastatin 80 milliGRAM(s) Oral at bedtime  bisacodyl Suppository 10 milliGRAM(s) Rectal once  chlorhexidine 2% Cloths 1 Application(s) Topical daily  dorzolamide 2% Ophthalmic Solution 1 Drop(s) Both EYES <User Schedule>  enoxaparin Injectable 40 milliGRAM(s) SubCutaneous every 24 hours  gabapentin 100 milliGRAM(s) Oral every 8 hours  insulin glargine Injectable (LANTUS) 15 Unit(s) SubCutaneous at bedtime  insulin lispro (ADMELOG) corrective regimen sliding scale   SubCutaneous three times a day before meals  insulin lispro (ADMELOG) corrective regimen sliding scale   SubCutaneous at bedtime  insulin lispro Injectable (ADMELOG) 5 Unit(s) SubCutaneous three times a day before meals  montelukast 10 milliGRAM(s) Oral daily  oxyCODONE    IR 5 milliGRAM(s) Oral every 4 hours PRN  oxyCODONE    IR 10 milliGRAM(s) Oral every 4 hours PRN  pantoprazole    Tablet 40 milliGRAM(s) Oral before breakfast  polyethylene glycol 3350 17 Gram(s) Oral daily  senna 2 Tablet(s) Oral at bedtime  sodium chloride 0.9%. 1000 milliLiter(s) IV Continuous <Continuous>  tamsulosin 0.4 milliGRAM(s) Oral at bedtime      >>> <<<  PHYSICAL EXAM  Subjective: " HI, I feel fine"   Neurology: alert and oriented x 3, nonfocal, no gross deficits  CV : RRR S1S2, no murmurs, rubs or gallops   Sternal Wound :  CDI , Stable +PW   Lungs: CTA B/L, No wheezing, rales, rhonchi. Non labored respirations   Abdomen: soft, NT, ND, (-)BM  :  voiding  Extremities: No LE edema bilaterally, +DP, No calf tenderness      Chest tube:   L CT - LWS        LABS  03-10    135  |  101  |  21  ----------------------------<  146<H>  4.5   |  25  |  1.00    Ca    7.9<L>      10 Mar 2023 00:25  Phos  2.7     03-10  Mg     2.2     03-10    TPro  5.8<L>  /  Alb  3.4  /  TBili  0.6  /  DBili  x   /  AST  27  /  ALT  20  /  AlkPhos  53  03-10                                 9.2    12.27 )-----------( 135      ( 10 Mar 2023 00:26 )             29.0          PT/INR - ( 09 Mar 2023 14:35 )   PT: 13.8 sec;   INR: 1.19 ratio         PTT - ( 09 Mar 2023 14:35 )  PTT:28.3 sec       PAST MEDICAL & SURGICAL HISTORY:  Hip pain, right      Hypertension      Glaucoma      Retinal detachments and defects  bilateral      Macular degeneration of both eyes      DM (diabetes mellitus)      S/P hip replacement, left  2006      Recent retinal detachment, total, bilateral   (left),  (right)      S/P appendectomy      H/O total hip arthroplasty, right  Right hip replacement

## 2023-03-10 NOTE — PROGRESS NOTE ADULT - PROBLEM SELECTOR PLAN 1
c/w ASA 81QD, Ator 81mgQD  Off BB 2/2 to ?vagal event vs PEA arrest?  + PW in place   Maintain LP CT -LWS  Monitor drainage  Diuretics d'cd yesterday 3/9   Cough and deep breathe, Incentive Spirometry Q1h, Chest PT, Pulm Toilet  Ambulate 3x daily as tolerated and with PT  C/W GI prophylaxis  DVT prophylaxis

## 2023-03-10 NOTE — PROGRESS NOTE ADULT - SUBJECTIVE AND OBJECTIVE BOX
CRITICAL CARE ATTENDING - CTICU    MEDICATIONS  (STANDING):  acetaminophen     Tablet .. 650 milliGRAM(s) Oral every 6 hours  ascorbic acid 500 milliGRAM(s) Oral two times a day  aspirin enteric coated 81 milliGRAM(s) Oral daily  atorvastatin 80 milliGRAM(s) Oral at bedtime  bisacodyl Suppository 10 milliGRAM(s) Rectal once  chlorhexidine 2% Cloths 1 Application(s) Topical daily  dorzolamide 2% Ophthalmic Solution 1 Drop(s) Both EYES <User Schedule>  enoxaparin Injectable 40 milliGRAM(s) SubCutaneous every 24 hours  gabapentin 100 milliGRAM(s) Oral every 8 hours  insulin glargine Injectable (LANTUS) 15 Unit(s) SubCutaneous at bedtime  insulin lispro (ADMELOG) corrective regimen sliding scale   SubCutaneous three times a day before meals  insulin lispro (ADMELOG) corrective regimen sliding scale   SubCutaneous at bedtime  insulin lispro Injectable (ADMELOG) 5 Unit(s) SubCutaneous three times a day before meals  montelukast 10 milliGRAM(s) Oral daily  pantoprazole    Tablet 40 milliGRAM(s) Oral before breakfast  polyethylene glycol 3350 17 Gram(s) Oral daily  senna 2 Tablet(s) Oral at bedtime  sodium chloride 0.9%. 1000 milliLiter(s) (10 mL/Hr) IV Continuous <Continuous>  tamsulosin 0.4 milliGRAM(s) Oral at bedtime                                    9.2    12.27 )-----------( 135      ( 10 Mar 2023 00:26 )             29.0       03-10    135  |  101  |  21  ----------------------------<  146<H>  4.5   |  25  |  1.00    Ca    7.9<L>      10 Mar 2023 00:25  Phos  2.7     03-10  Mg     2.2     03-10    TPro  5.8<L>  /  Alb  3.4  /  TBili  0.6  /  DBili  x   /  AST  27  /  ALT  20  /  AlkPhos  53  03-10      PT/INR - ( 09 Mar 2023 14:35 )   PT: 13.8 sec;   INR: 1.19 ratio         PTT - ( 09 Mar 2023 14:35 )  PTT:28.3 sec        Daily     Daily Weight in k.2 (10 Mar 2023 00:00)       @ 07:01  -  03-10 @ 07:00  --------------------------------------------------------  IN: 1026 mL / OUT: 1700 mL / NET: -674 mL          Critically Ill patient  : [ ] preoperative ,   [ x] post operative    Requires :  [ ] Arterial Line   [ ] Central Line  [ ] PA catheter  [ ] IABP  [ ] ECMO  [ ] LVAD  [ ] Ventilator  [x] pacemaker- TPM [ ] Impella.                      [x ] ABG's     [ x] Pulse Oxymetry Monitoring  Bedside evaluation , monitoring , treatment of hemodynamics , fluids , IVP/ IVCD meds.        Diagnosis:     POD 3 - CABG X 2 L     POD1 Return to CTICU - Cardiac Arrest / CPR     Bradycardia    Hypotension     Hypovolemia     Hemodynamic lability,  instability. Requires IVCD [ ] vasopressors [ ] inotropes  [ ] vasodilator  [ x]IVSS fluid  to maintain MAP, perfusion, C.I.     Syncope     Spontaneous ROSC     Chest Tube Drainage / Management     Post Op Bleeding - s/p return to OR post op     Thrombocytopenia     TTE - no pericardial effusion - Normal LV function - Mild RV dysfunction     ECG     Admelog sliding scale and Lantus    Holding Beta Blockers           By signing my name below, I, Maria D'Amico, attest that this documentation has been prepared under the direction and in the presence of Terrance Pimentel MD.   Electronically Signed: Maria D'Amico, Scribe 03-10-23 @ 07:07      Discussed with CT surgeon, Physician Assistant - Nurse Practitioner- Critical care medicine team.   Discussed at  AM / PM rounds.   Chart, labs , films reviewed.    Cumulative Critical Care Time Given Today:

## 2023-03-10 NOTE — OCCUPATIONAL THERAPY INITIAL EVALUATION ADULT - ADDITIONAL COMMENTS
Pt lives in PH w/ spouse +3 FRANCES[address] and 13 steps inside to bedroom; owns tub w/ tub chair, RW. PTA pt was independent with all ADLs w/o AE

## 2023-03-10 NOTE — PROGRESS NOTE ADULT - PROBLEM SELECTOR PLAN 2
On medications,  no chest pain, stable, monitored and followed up by primary cardiothoracic team/cardiology team.  s/p cabg

## 2023-03-10 NOTE — PROGRESS NOTE ADULT - ASSESSMENT
Assessment  DMT2: 72y Male with DM T2 with hyperglycemia, A1C 6.1% , was on oral DM meds at home, s/p CABG, now off insulin drip transitioned to basal and bolus, glucose improving, eating meals, had to return to CTU for code blue. Now transferred to floors.   CAD: on medications, stable, monitored. s/p CABG  HTN: on antihypertensive medications, monitored asymptomatic.  Obesity: No strict exercise routines, not on any weight loss program, neither on low calorie diet.        Discussed plan and management with Attending.   Juliet Ro MD  Cell: [phone]  Office: [phone]               Assessment  DMT2: 72y Male with DM T2 with hyperglycemia, A1C 6.1% , was on oral DM meds at home, s/p CABG, now off insulin drip transitioned to basal and bolus, glucose improving, eating meals, had to return to CTU for code blue. Now transferred to floors.   CAD: on medications, stable, monitored. s/p CABG  HTN: on antihypertensive medications, monitored asymptomatic.  Obesity: No strict exercise routines, not on any weight loss program, neither on low calorie diet.        Discussed plan and management with Attending.   LLOYD Mcdaniel MD  Cell: [phone]  Office: [phone]               Assessment  DMT2: 72y Male with DM T2 with hyperglycemia, A1C 6.1% , was on oral DM meds at home, s/p CABG, now off insulin drip transitioned to  basal and bolus, glucose improving, eating meals, had to return to CTU for code blue. Now transferred to floors.   CAD: on medications, stable, monitored. s/p CABG  HTN: on antihypertensive medications, monitored asymptomatic.  Obesity: No strict exercise routines, not on any weight loss program, neither on low calorie diet.        Discussed plan and management with Attending.   LLOYD Mcdaniel MD  Cell: [phone]  Office: [phone]

## 2023-03-10 NOTE — OCCUPATIONAL THERAPY INITIAL EVALUATION ADULT - PERTINENT HX OF CURRENT PROBLEM, REHAB EVAL
73 yo Man with PMHx of HTN, HLD, DMT2 (last A1C 6.2) presents for cardiac cath. pt reports he has been feeling intermittent SOB and dizziness, evaluated by Dr. Vieira and had abnormal cardiac CTA after having abnormal TTE and exercise stress test. He denies chest pain, palpitation or SOB currently. S/p CABG on 3/7/23

## 2023-03-10 NOTE — PROGRESS NOTE ADULT - PROBLEM SELECTOR PLAN 2
Off Insulin Gtt,   Dr Ro following   ACHS, ISS   Continue on ADM premeals and Lantus at bedtime   A1c 6.0

## 2023-03-10 NOTE — PROGRESS NOTE ADULT - PROBLEM SELECTOR PLAN 1
Will continue current insulin regimen for now.   Will continue monitoring FS, log, and glucose trends, will Follow up.  Patient counseled for compliance with consistent low carb diet and exercise as tolerated outpatient.

## 2023-03-11 LAB
ALBUMIN SERPL ELPH-MCNC: 3.2 G/DL — LOW (ref 3.3–5)
ALP SERPL-CCNC: 61 U/L — SIGNIFICANT CHANGE UP (ref 40–120)
ALT FLD-CCNC: 19 U/L — SIGNIFICANT CHANGE UP (ref 10–45)
ANION GAP SERPL CALC-SCNC: 10 MMOL/L — SIGNIFICANT CHANGE UP (ref 5–17)
AST SERPL-CCNC: 21 U/L — SIGNIFICANT CHANGE UP (ref 10–40)
BASOPHILS # BLD AUTO: 0.02 K/UL — SIGNIFICANT CHANGE UP (ref 0–0.2)
BASOPHILS NFR BLD AUTO: 0.2 % — SIGNIFICANT CHANGE UP (ref 0–2)
BILIRUB SERPL-MCNC: 0.7 MG/DL — SIGNIFICANT CHANGE UP (ref 0.2–1.2)
BUN SERPL-MCNC: 19 MG/DL — SIGNIFICANT CHANGE UP (ref 7–23)
CALCIUM SERPL-MCNC: 8.1 MG/DL — LOW (ref 8.4–10.5)
CHLORIDE SERPL-SCNC: 99 MMOL/L — SIGNIFICANT CHANGE UP (ref 96–108)
CO2 SERPL-SCNC: 26 MMOL/L — SIGNIFICANT CHANGE UP (ref 22–31)
CREAT SERPL-MCNC: 0.83 MG/DL — SIGNIFICANT CHANGE UP (ref 0.5–1.3)
EGFR: 92 ML/MIN/1.73M2 — SIGNIFICANT CHANGE UP
EOSINOPHIL # BLD AUTO: 0.08 K/UL — SIGNIFICANT CHANGE UP (ref 0–0.5)
EOSINOPHIL NFR BLD AUTO: 0.9 % — SIGNIFICANT CHANGE UP (ref 0–6)
GLUCOSE BLDC GLUCOMTR-MCNC: 104 MG/DL — HIGH (ref 70–99)
GLUCOSE BLDC GLUCOMTR-MCNC: 118 MG/DL — HIGH (ref 70–99)
GLUCOSE BLDC GLUCOMTR-MCNC: 123 MG/DL — HIGH (ref 70–99)
GLUCOSE BLDC GLUCOMTR-MCNC: 151 MG/DL — HIGH (ref 70–99)
GLUCOSE SERPL-MCNC: 198 MG/DL — HIGH (ref 70–99)
HCT VFR BLD CALC: 28 % — LOW (ref 39–50)
HGB BLD-MCNC: 9.2 G/DL — LOW (ref 13–17)
IMM GRANULOCYTES NFR BLD AUTO: 0.7 % — SIGNIFICANT CHANGE UP (ref 0–0.9)
LYMPHOCYTES # BLD AUTO: 0.98 K/UL — LOW (ref 1–3.3)
LYMPHOCYTES # BLD AUTO: 11.4 % — LOW (ref 13–44)
MCHC RBC-ENTMCNC: 29.5 PG — SIGNIFICANT CHANGE UP (ref 27–34)
MCHC RBC-ENTMCNC: 32.9 GM/DL — SIGNIFICANT CHANGE UP (ref 32–36)
MCV RBC AUTO: 89.7 FL — SIGNIFICANT CHANGE UP (ref 80–100)
MONOCYTES # BLD AUTO: 0.71 K/UL — SIGNIFICANT CHANGE UP (ref 0–0.9)
MONOCYTES NFR BLD AUTO: 8.3 % — SIGNIFICANT CHANGE UP (ref 2–14)
NEUTROPHILS # BLD AUTO: 6.73 K/UL — SIGNIFICANT CHANGE UP (ref 1.8–7.4)
NEUTROPHILS NFR BLD AUTO: 78.5 % — HIGH (ref 43–77)
NRBC # BLD: 0 /100 WBCS — SIGNIFICANT CHANGE UP (ref 0–0)
PLATELET # BLD AUTO: 163 K/UL — SIGNIFICANT CHANGE UP (ref 150–400)
POTASSIUM SERPL-MCNC: 4.2 MMOL/L — SIGNIFICANT CHANGE UP (ref 3.5–5.3)
POTASSIUM SERPL-SCNC: 4.2 MMOL/L — SIGNIFICANT CHANGE UP (ref 3.5–5.3)
PROT SERPL-MCNC: 5.7 G/DL — LOW (ref 6–8.3)
RBC # BLD: 3.12 M/UL — LOW (ref 4.2–5.8)
RBC # FLD: 14.1 % — SIGNIFICANT CHANGE UP (ref 10.3–14.5)
SODIUM SERPL-SCNC: 135 MMOL/L — SIGNIFICANT CHANGE UP (ref 135–145)
WBC # BLD: 8.58 K/UL — SIGNIFICANT CHANGE UP (ref 3.8–10.5)
WBC # FLD AUTO: 8.58 K/UL — SIGNIFICANT CHANGE UP (ref 3.8–10.5)

## 2023-03-11 PROCEDURE — 71045 X-RAY EXAM CHEST 1 VIEW: CPT | Mod: 26

## 2023-03-11 RX ORDER — FUROSEMIDE 40 MG
20 TABLET ORAL DAILY
Refills: 0 | Status: DISCONTINUED | OUTPATIENT
Start: 2023-03-11 | End: 2023-03-14

## 2023-03-11 RX ORDER — METOPROLOL TARTRATE 50 MG
12.5 TABLET ORAL
Refills: 0 | Status: DISCONTINUED | OUTPATIENT
Start: 2023-03-11 | End: 2023-03-12

## 2023-03-11 RX ORDER — SPIRONOLACTONE 25 MG/1
25 TABLET, FILM COATED ORAL DAILY
Refills: 0 | Status: DISCONTINUED | OUTPATIENT
Start: 2023-03-11 | End: 2023-03-14

## 2023-03-11 RX ORDER — SORBITOL SOLUTION 70 %
30 SOLUTION, ORAL MISCELLANEOUS ONCE
Refills: 0 | Status: COMPLETED | OUTPATIENT
Start: 2023-03-11 | End: 2023-03-11

## 2023-03-11 RX ADMIN — Medication 30 MILLILITER(S): at 09:10

## 2023-03-11 RX ADMIN — ENOXAPARIN SODIUM 40 MILLIGRAM(S): 100 INJECTION SUBCUTANEOUS at 13:46

## 2023-03-11 RX ADMIN — OXYCODONE HYDROCHLORIDE 5 MILLIGRAM(S): 5 TABLET ORAL at 09:08

## 2023-03-11 RX ADMIN — Medication 81 MILLIGRAM(S): at 12:12

## 2023-03-11 RX ADMIN — CHLORHEXIDINE GLUCONATE 1 APPLICATION(S): 213 SOLUTION TOPICAL at 11:44

## 2023-03-11 RX ADMIN — Medication 5 UNIT(S): at 07:56

## 2023-03-11 RX ADMIN — GABAPENTIN 100 MILLIGRAM(S): 400 CAPSULE ORAL at 21:21

## 2023-03-11 RX ADMIN — GABAPENTIN 100 MILLIGRAM(S): 400 CAPSULE ORAL at 13:47

## 2023-03-11 RX ADMIN — Medication 500 MILLIGRAM(S): at 05:29

## 2023-03-11 RX ADMIN — DORZOLAMIDE HYDROCHLORIDE 1 DROP(S): 20 SOLUTION/ DROPS OPHTHALMIC at 21:21

## 2023-03-11 RX ADMIN — TAMSULOSIN HYDROCHLORIDE 0.4 MILLIGRAM(S): 0.4 CAPSULE ORAL at 21:21

## 2023-03-11 RX ADMIN — GABAPENTIN 100 MILLIGRAM(S): 400 CAPSULE ORAL at 05:29

## 2023-03-11 RX ADMIN — Medication 5 UNIT(S): at 12:05

## 2023-03-11 RX ADMIN — Medication 5 UNIT(S): at 16:54

## 2023-03-11 RX ADMIN — ATORVASTATIN CALCIUM 80 MILLIGRAM(S): 80 TABLET, FILM COATED ORAL at 21:21

## 2023-03-11 RX ADMIN — MONTELUKAST 10 MILLIGRAM(S): 4 TABLET, CHEWABLE ORAL at 12:08

## 2023-03-11 RX ADMIN — OXYCODONE HYDROCHLORIDE 5 MILLIGRAM(S): 5 TABLET ORAL at 10:06

## 2023-03-11 RX ADMIN — SPIRONOLACTONE 25 MILLIGRAM(S): 25 TABLET, FILM COATED ORAL at 09:52

## 2023-03-11 RX ADMIN — POLYETHYLENE GLYCOL 3350 17 GRAM(S): 17 POWDER, FOR SOLUTION ORAL at 12:09

## 2023-03-11 RX ADMIN — Medication 1: at 12:06

## 2023-03-11 RX ADMIN — PANTOPRAZOLE SODIUM 40 MILLIGRAM(S): 20 TABLET, DELAYED RELEASE ORAL at 05:30

## 2023-03-11 RX ADMIN — Medication 500 MILLIGRAM(S): at 17:14

## 2023-03-11 RX ADMIN — Medication 12.5 MILLIGRAM(S): at 17:36

## 2023-03-11 RX ADMIN — INSULIN GLARGINE 15 UNIT(S): 100 INJECTION, SOLUTION SUBCUTANEOUS at 22:17

## 2023-03-11 RX ADMIN — DORZOLAMIDE HYDROCHLORIDE 1 DROP(S): 20 SOLUTION/ DROPS OPHTHALMIC at 10:02

## 2023-03-11 RX ADMIN — Medication 20 MILLIGRAM(S): at 09:52

## 2023-03-11 NOTE — PROGRESS NOTE ADULT - ASSESSMENT
73 yo Man with PMHx of HTN, HLD, DMT2 (last A1C 6.2) presents for cardiac cath. pt reports he has been feeling intermittent SOB and dizziness, evaluated by Dr. Vieira and had abnormal cardiac CTA after having abnormal TTE and exercise stress test. He denies chest pain, palpitation or SOB currently.     Pt now s/p cardiac cath on 3/2/23 revealing triple vessel disease (mLAD 90%, distal Cx 80%, mRCA 80%). Ct surgery consulted for CABG eval with Dr. Torres.  likely plan for OR either Tuesday or Thursday.    3/3 VSS overnight. HR/BP stable on Lopressor 12.5mg BID. No CP/SOB.  Pending TTE today.  P2y12 187 this AM.  Repeat TSH 5 however remainder of Thyroid panel normal.  UA and MRSA PCR ordered   3/4 VSS no chest pain OR tuesday. Continue preop work up  3/5 VSS CP free OR Tuesday  3/6 Preop OR tomorrow  3/7 S/P C3L, extubated Intra-Op with intrathecal morphine given. RTOR for bleeding. 2u PRBC post op.  3/8 Transfer to SDU from CTU, + RIJ in place, + PW, + MED CTx1, + LP CT x1 , monitor drainage, + Casiano in place,   3/9 VSS D/c IJ d/c  insulin gtt d/c casiano diuresis with lasix 40 bid maintain chest tube med 360 left pleura 425 initiate  Lopressor 25  BID- Return to CTU for ?PEA ARREST?  3/10 Moves back out to floor. VSS; LP chest tube in place. Off diuretics, BB on Hold.   3/11 L pl tube w/ 6 output Off betablocker syncope/low dejan state CXR Will start low dose diuretic

## 2023-03-11 NOTE — PROGRESS NOTE ADULT - SUBJECTIVE AND OBJECTIVE BOX
Subjective:   "Hello"  OOB chair      Tele:  SR  80s           V/S:                    T(F): 98.1 (03-11-23 @ 04:56), Max: 98.8 (03-10-23 @ 10:12)  HR: 61 (03-11-23 @ 04:56) (61 - 92)  BP: 109/64 (03-11-23 @ 04:56) (103/50 - 131/73)  RR: 18 (03-11-23 @ 04:56) (18 - 28)  SpO2: 98% (03-11-23 @ 04:56) (92% - 98%)  Wt(kg): --      LV EF:      Labs:  03-10    135  |  101  |  21  ----------------------------<  146<H>  4.5   |  25  |  1.00    Ca    7.9<L>      10 Mar 2023 00:25  Phos  2.7     03-10  Mg     2.2     03-10    TPro  5.8<L>  /  Alb  3.4  /  TBili  0.6  /  DBili  x   /  AST  27  /  ALT  20  /  AlkPhos  53  03-10                               9.2    12.27 )-----------( 135      ( 10 Mar 2023 00:26 )             29.0        PT/INR - ( 09 Mar 2023 14:35 )   PT: 13.8 sec;   INR: 1.19 ratio         PTT - ( 09 Mar 2023 14:35 )  PTT:28.3 sec     CAPILLARY BLOOD GLUCOSE      POCT Blood Glucose.: 123 mg/dL (11 Mar 2023 07:46)  POCT Blood Glucose.: 123 mg/dL (10 Mar 2023 21:17)  POCT Blood Glucose.: 129 mg/dL (10 Mar 2023 16:18)  POCT Blood Glucose.: 133 mg/dL (10 Mar 2023 11:37)           CXR:    I&O's Detail    10 Mar 2023 07:01  -  11 Mar 2023 07:00  --------------------------------------------------------  IN:    Oral Fluid: 500 mL  Total IN: 500 mL    OUT:    Chest Tube (mL): 0 mL    Chest Tube (mL): 250 mL    sodium chloride 0.9%: 0 mL    Voided (mL): 650 mL  Total OUT: 900 mL    Total NET: -400 mL          CHEST TUBE:  [x ] YES [ ] NO  OUTPUT:     per 24 hours    AIR LEAKS:  [ ] YES [ ] NO      EPICARDIAL WIRES:  [x ] YES [ ] NO      BOWEL MOVEMENT:  [ ] YES [ x] NO      Daily     Daily   Medications:  acetaminophen     Tablet .. 650 milliGRAM(s) Oral every 6 hours PRN  ascorbic acid 500 milliGRAM(s) Oral two times a day  aspirin enteric coated 81 milliGRAM(s) Oral daily  atorvastatin 80 milliGRAM(s) Oral at bedtime  bisacodyl Suppository 10 milliGRAM(s) Rectal once  chlorhexidine 2% Cloths 1 Application(s) Topical daily  dorzolamide 2% Ophthalmic Solution 1 Drop(s) Both EYES <User Schedule>  enoxaparin Injectable 40 milliGRAM(s) SubCutaneous every 24 hours  gabapentin 100 milliGRAM(s) Oral every 8 hours  insulin glargine Injectable (LANTUS) 15 Unit(s) SubCutaneous at bedtime  insulin lispro (ADMELOG) corrective regimen sliding scale   SubCutaneous three times a day before meals  insulin lispro (ADMELOG) corrective regimen sliding scale   SubCutaneous at bedtime  insulin lispro Injectable (ADMELOG) 5 Unit(s) SubCutaneous three times a day before meals  montelukast 10 milliGRAM(s) Oral daily  oxyCODONE    IR 5 milliGRAM(s) Oral every 4 hours PRN  oxyCODONE    IR 10 milliGRAM(s) Oral every 4 hours PRN  pantoprazole    Tablet 40 milliGRAM(s) Oral before breakfast  polyethylene glycol 3350 17 Gram(s) Oral daily  senna 2 Tablet(s) Oral at bedtime  sodium chloride 0.9%. 1000 milliLiter(s) IV Continuous <Continuous>  tamsulosin 0.4 milliGRAM(s) Oral at bedtime        Physical Exam:      Neurology: alert and oriented x 3    CV : RRR S1S2, no murmurs,     Sternal Wound :  CDI , Stable +PW     Lungs: CTA B/L, No wheezing, rales, rhonchi. spplemental O2 in  use  Fair effort incentive spirometer  Left pleural tube suction>serosang    Abdomen: soft, NT, ND, (-)BM    :  voiding    Extremities: No LE edema bilaterally, +DP, No calf tenderness                PAST MEDICAL & SURGICAL HISTORY:  Hip pain, right      Hypertension      Glaucoma      Retinal detachments and defects  bilateral      Macular degeneration of both eyes      DM (diabetes mellitus)      S/P hip replacement, left  2006      Recent retinal detachment, total, bilateral  1989 (left), 2006 (right)      S/P appendectomy      H/O total hip arthroplasty, right  Right hip replacement 2010

## 2023-03-11 NOTE — PROGRESS NOTE ADULT - ASSESSMENT
Assessment  DMT2: 72y Male with DM T2 with hyperglycemia, A1C 6.1% , was on oral DM meds at home, s/p CABG, now off insulin drip transitioned to  basal and bolus, blood sugars are trending down, he is eating and ambulating with help.  CAD: on medications, stable, monitored. s/p CABG  HTN: on antihypertensive medications, monitored asymptomatic.  Obesity: No strict exercise routines, not on any weight loss program, neither on low calorie diet.        Discussed plan and management with Attending.   LLOYD Mcdaniel MD  Cell: [phone]  Office: [phone]

## 2023-03-11 NOTE — PROGRESS NOTE ADULT - SUBJECTIVE AND OBJECTIVE BOX
Chief complaint    Patient is a 72y old  Male who presents with a chief complaint of C3LIMA (11 Mar 2023 08:09)   Review of systems  Patient appears comfortable.    Labs and Fingersticks  CAPILLARY BLOOD GLUCOSE      POCT Blood Glucose.: 123 mg/dL (11 Mar 2023 07:46)  POCT Blood Glucose.: 123 mg/dL (10 Mar 2023 21:17)  POCT Blood Glucose.: 129 mg/dL (10 Mar 2023 16:18)  POCT Blood Glucose.: 133 mg/dL (10 Mar 2023 11:37)      Anion Gap, Serum: 9 (03-10 @ 00:25)  Anion Gap, Serum: 13 (03-09 @ 14:35)      Calcium, Total Serum: 7.9 *L* (03-10 @ 00:25)  Calcium, Total Serum: 8.0 *L* (03-09 @ 14:35)  Albumin, Serum: 3.4 (03-10 @ 00:25)  Albumin, Serum: 3.8 (03-09 @ 14:35)    Alanine Aminotransferase (ALT/SGPT): 20 (03-10 @ 00:25)  Alanine Aminotransferase (ALT/SGPT): 23 (03-09 @ 14:35)  Alkaline Phosphatase, Serum: 53 (03-10 @ 00:25)  Alkaline Phosphatase, Serum: 51 (03-09 @ 14:35)  Aspartate Aminotransferase (AST/SGOT): 27 (03-10 @ 00:25)  Aspartate Aminotransferase (AST/SGOT): 32 (03-09 @ 14:35)        03-10    135  |  101  |  21  ----------------------------<  146<H>  4.5   |  25  |  1.00    Ca    7.9<L>      10 Mar 2023 00:25  Phos  2.7     03-10  Mg     2.2     03-10    TPro  5.8<L>  /  Alb  3.4  /  TBili  0.6  /  DBili  x   /  AST  27  /  ALT  20  /  AlkPhos  53  03-10                        9.2    12.27 )-----------( 135      ( 10 Mar 2023 00:26 )             29.0     Medications  MEDICATIONS  (STANDING):  ascorbic acid 500 milliGRAM(s) Oral two times a day  aspirin enteric coated 81 milliGRAM(s) Oral daily  atorvastatin 80 milliGRAM(s) Oral at bedtime  bisacodyl Suppository 10 milliGRAM(s) Rectal once  chlorhexidine 2% Cloths 1 Application(s) Topical daily  dorzolamide 2% Ophthalmic Solution 1 Drop(s) Both EYES <User Schedule>  enoxaparin Injectable 40 milliGRAM(s) SubCutaneous every 24 hours  gabapentin 100 milliGRAM(s) Oral every 8 hours  insulin glargine Injectable (LANTUS) 15 Unit(s) SubCutaneous at bedtime  insulin lispro (ADMELOG) corrective regimen sliding scale   SubCutaneous three times a day before meals  insulin lispro (ADMELOG) corrective regimen sliding scale   SubCutaneous at bedtime  insulin lispro Injectable (ADMELOG) 5 Unit(s) SubCutaneous three times a day before meals  montelukast 10 milliGRAM(s) Oral daily  pantoprazole    Tablet 40 milliGRAM(s) Oral before breakfast  polyethylene glycol 3350 17 Gram(s) Oral daily  senna 2 Tablet(s) Oral at bedtime  sodium chloride 0.9%. 1000 milliLiter(s) (10 mL/Hr) IV Continuous <Continuous>  sorbitol 70% Solution 30 milliLiter(s) Oral once  tamsulosin 0.4 milliGRAM(s) Oral at bedtime      Physical Exam  General: Patient appears comfortable.  Vital Signs Last 12 Hrs  T(F): 98.1 (03-11-23 @ 04:56), Max: 98.1 (03-11-23 @ 04:56)  HR: 61 (03-11-23 @ 04:56) (61 - 61)  BP: 109/64 (03-11-23 @ 04:56) (109/64 - 109/64)  BP(mean): --  RR: 18 (03-11-23 @ 04:56) (18 - 18)  SpO2: 98% (03-11-23 @ 04:56) (98% - 98%)  Neck: No palpable thyroid nodules.  CVS: S1S2, No murmurs  Respiratory: No wheezing, no crepitations  GI: Abdomen soft, non tender.    Diagnostics        Radiology:

## 2023-03-11 NOTE — PROGRESS NOTE ADULT - PROBLEM SELECTOR PLAN 1
c/w ASA 81QD, Ator 81mgQD  Off BB 2/2 to vagal event /low dejan state  + PW   Maintain LP CT -LWS  Monitor drainage  Diuretics started  Cough and deep breathe, Incentive Spirometry Q1h, Chest PT, Pulm Toilet  Ambulate 3x daily as tolerated and with PT  C/W GI prophylaxis  DVT prophylaxis

## 2023-03-12 LAB
ALBUMIN SERPL ELPH-MCNC: 3 G/DL — LOW (ref 3.3–5)
ALP SERPL-CCNC: 67 U/L — SIGNIFICANT CHANGE UP (ref 40–120)
ALT FLD-CCNC: 22 U/L — SIGNIFICANT CHANGE UP (ref 10–45)
ANION GAP SERPL CALC-SCNC: 11 MMOL/L — SIGNIFICANT CHANGE UP (ref 5–17)
AST SERPL-CCNC: 23 U/L — SIGNIFICANT CHANGE UP (ref 10–40)
BASOPHILS # BLD AUTO: 0.04 K/UL — SIGNIFICANT CHANGE UP (ref 0–0.2)
BASOPHILS NFR BLD AUTO: 0.5 % — SIGNIFICANT CHANGE UP (ref 0–2)
BILIRUB SERPL-MCNC: 0.6 MG/DL — SIGNIFICANT CHANGE UP (ref 0.2–1.2)
BUN SERPL-MCNC: 18 MG/DL — SIGNIFICANT CHANGE UP (ref 7–23)
CALCIUM SERPL-MCNC: 8.2 MG/DL — LOW (ref 8.4–10.5)
CHLORIDE SERPL-SCNC: 103 MMOL/L — SIGNIFICANT CHANGE UP (ref 96–108)
CO2 SERPL-SCNC: 25 MMOL/L — SIGNIFICANT CHANGE UP (ref 22–31)
CREAT SERPL-MCNC: 0.86 MG/DL — SIGNIFICANT CHANGE UP (ref 0.5–1.3)
EGFR: 91 ML/MIN/1.73M2 — SIGNIFICANT CHANGE UP
EOSINOPHIL # BLD AUTO: 0.17 K/UL — SIGNIFICANT CHANGE UP (ref 0–0.5)
EOSINOPHIL NFR BLD AUTO: 2.3 % — SIGNIFICANT CHANGE UP (ref 0–6)
GLUCOSE BLDC GLUCOMTR-MCNC: 108 MG/DL — HIGH (ref 70–99)
GLUCOSE BLDC GLUCOMTR-MCNC: 110 MG/DL — HIGH (ref 70–99)
GLUCOSE BLDC GLUCOMTR-MCNC: 113 MG/DL — HIGH (ref 70–99)
GLUCOSE BLDC GLUCOMTR-MCNC: 124 MG/DL — HIGH (ref 70–99)
GLUCOSE SERPL-MCNC: 105 MG/DL — HIGH (ref 70–99)
HCT VFR BLD CALC: 26.5 % — LOW (ref 39–50)
HGB BLD-MCNC: 8.5 G/DL — LOW (ref 13–17)
IMM GRANULOCYTES NFR BLD AUTO: 0.7 % — SIGNIFICANT CHANGE UP (ref 0–0.9)
LYMPHOCYTES # BLD AUTO: 1.41 K/UL — SIGNIFICANT CHANGE UP (ref 1–3.3)
LYMPHOCYTES # BLD AUTO: 19.1 % — SIGNIFICANT CHANGE UP (ref 13–44)
MAGNESIUM SERPL-MCNC: 2.3 MG/DL — SIGNIFICANT CHANGE UP (ref 1.6–2.6)
MCHC RBC-ENTMCNC: 28.7 PG — SIGNIFICANT CHANGE UP (ref 27–34)
MCHC RBC-ENTMCNC: 32.1 GM/DL — SIGNIFICANT CHANGE UP (ref 32–36)
MCV RBC AUTO: 89.5 FL — SIGNIFICANT CHANGE UP (ref 80–100)
MONOCYTES # BLD AUTO: 1.13 K/UL — HIGH (ref 0–0.9)
MONOCYTES NFR BLD AUTO: 15.3 % — HIGH (ref 2–14)
NEUTROPHILS # BLD AUTO: 4.59 K/UL — SIGNIFICANT CHANGE UP (ref 1.8–7.4)
NEUTROPHILS NFR BLD AUTO: 62.1 % — SIGNIFICANT CHANGE UP (ref 43–77)
NRBC # BLD: 0 /100 WBCS — SIGNIFICANT CHANGE UP (ref 0–0)
PHOSPHATE SERPL-MCNC: 2.9 MG/DL — SIGNIFICANT CHANGE UP (ref 2.5–4.5)
PLATELET # BLD AUTO: 190 K/UL — SIGNIFICANT CHANGE UP (ref 150–400)
POTASSIUM SERPL-MCNC: 3.7 MMOL/L — SIGNIFICANT CHANGE UP (ref 3.5–5.3)
POTASSIUM SERPL-SCNC: 3.7 MMOL/L — SIGNIFICANT CHANGE UP (ref 3.5–5.3)
PROT SERPL-MCNC: 5.6 G/DL — LOW (ref 6–8.3)
RBC # BLD: 2.96 M/UL — LOW (ref 4.2–5.8)
RBC # FLD: 13.9 % — SIGNIFICANT CHANGE UP (ref 10.3–14.5)
SODIUM SERPL-SCNC: 139 MMOL/L — SIGNIFICANT CHANGE UP (ref 135–145)
WBC # BLD: 7.39 K/UL — SIGNIFICANT CHANGE UP (ref 3.8–10.5)
WBC # FLD AUTO: 7.39 K/UL — SIGNIFICANT CHANGE UP (ref 3.8–10.5)

## 2023-03-12 PROCEDURE — 71045 X-RAY EXAM CHEST 1 VIEW: CPT | Mod: 26

## 2023-03-12 RX ORDER — METOPROLOL TARTRATE 50 MG
12.5 TABLET ORAL ONCE
Refills: 0 | Status: COMPLETED | OUTPATIENT
Start: 2023-03-12 | End: 2023-03-12

## 2023-03-12 RX ORDER — METOPROLOL TARTRATE 50 MG
25 TABLET ORAL
Refills: 0 | Status: DISCONTINUED | OUTPATIENT
Start: 2023-03-12 | End: 2023-03-13

## 2023-03-12 RX ADMIN — PANTOPRAZOLE SODIUM 40 MILLIGRAM(S): 20 TABLET, DELAYED RELEASE ORAL at 05:11

## 2023-03-12 RX ADMIN — MONTELUKAST 10 MILLIGRAM(S): 4 TABLET, CHEWABLE ORAL at 14:14

## 2023-03-12 RX ADMIN — Medication 5 UNIT(S): at 11:55

## 2023-03-12 RX ADMIN — Medication 500 MILLIGRAM(S): at 05:11

## 2023-03-12 RX ADMIN — GABAPENTIN 100 MILLIGRAM(S): 400 CAPSULE ORAL at 14:14

## 2023-03-12 RX ADMIN — Medication 12.5 MILLIGRAM(S): at 09:56

## 2023-03-12 RX ADMIN — ENOXAPARIN SODIUM 40 MILLIGRAM(S): 100 INJECTION SUBCUTANEOUS at 14:15

## 2023-03-12 RX ADMIN — DORZOLAMIDE HYDROCHLORIDE 1 DROP(S): 20 SOLUTION/ DROPS OPHTHALMIC at 21:06

## 2023-03-12 RX ADMIN — GABAPENTIN 100 MILLIGRAM(S): 400 CAPSULE ORAL at 05:11

## 2023-03-12 RX ADMIN — DORZOLAMIDE HYDROCHLORIDE 1 DROP(S): 20 SOLUTION/ DROPS OPHTHALMIC at 10:33

## 2023-03-12 RX ADMIN — Medication 5 UNIT(S): at 07:53

## 2023-03-12 RX ADMIN — INSULIN GLARGINE 15 UNIT(S): 100 INJECTION, SOLUTION SUBCUTANEOUS at 21:57

## 2023-03-12 RX ADMIN — GABAPENTIN 100 MILLIGRAM(S): 400 CAPSULE ORAL at 21:06

## 2023-03-12 RX ADMIN — ATORVASTATIN CALCIUM 80 MILLIGRAM(S): 80 TABLET, FILM COATED ORAL at 21:07

## 2023-03-12 RX ADMIN — Medication 5 UNIT(S): at 17:05

## 2023-03-12 RX ADMIN — SPIRONOLACTONE 25 MILLIGRAM(S): 25 TABLET, FILM COATED ORAL at 05:11

## 2023-03-12 RX ADMIN — Medication 12.5 MILLIGRAM(S): at 05:11

## 2023-03-12 RX ADMIN — CHLORHEXIDINE GLUCONATE 1 APPLICATION(S): 213 SOLUTION TOPICAL at 13:25

## 2023-03-12 RX ADMIN — TAMSULOSIN HYDROCHLORIDE 0.4 MILLIGRAM(S): 0.4 CAPSULE ORAL at 21:08

## 2023-03-12 RX ADMIN — Medication 20 MILLIGRAM(S): at 05:11

## 2023-03-12 RX ADMIN — Medication 500 MILLIGRAM(S): at 17:06

## 2023-03-12 RX ADMIN — Medication 25 MILLIGRAM(S): at 17:06

## 2023-03-12 RX ADMIN — SENNA PLUS 2 TABLET(S): 8.6 TABLET ORAL at 21:07

## 2023-03-12 RX ADMIN — Medication 81 MILLIGRAM(S): at 14:14

## 2023-03-12 NOTE — PROGRESS NOTE ADULT - PROBLEM SELECTOR PLAN 1
c/w ASA 81QD, Ator 81mgQD  Lopressor increased 25 bid  DC PW   Diuretics leg edema  Cough and deep breathe, Incentive Spirometry Q1h, Chest PT, Pulm Toilet  Ambulate 3x daily as tolerated and with PT  C/W GI prophylaxis  DVT prophylaxis  LUIS

## 2023-03-12 NOTE — PROGRESS NOTE ADULT - SUBJECTIVE AND OBJECTIVE BOX
Subjective:  "Hello"  OOB chair, Family visited this afternoon      Tele:  SR  80-90           V/S:                    T(F): 98.5 (23 @ 12:00), Max: 98.7 (23 @ 20:55)  HR: 82 (23 @ 12:00) (82 - 89)  BP: 104/67 (23 @ 12:00) (104/67 - 127/66)  RR: 18 (23 @ 12:00) (18 - 18)  SpO2: 97% (23 @ 12:00) (97% - 98%)  Wt(kg): --      LV EF:      Labs:      139  |  103  |  18  ----------------------------<  105<H>  3.7   |  25  |  0.86    Ca    8.2<L>      12 Mar 2023 05:29  Phos  2.9       Mg     2.3         TPro  5.6<L>  /  Alb  3.0<L>  /  TBili  0.6  /  DBili  x   /  AST  23  /  ALT  22  /  AlkPhos  67                                 8.5    7.39  )-----------( 190      ( 12 Mar 2023 05:29 )             26.5             CAPILLARY BLOOD GLUCOSE      POCT Blood Glucose.: 110 mg/dL (12 Mar 2023 11:21)  POCT Blood Glucose.: 113 mg/dL (12 Mar 2023 07:44)  POCT Blood Glucose.: 104 mg/dL (11 Mar 2023 21:39)  POCT Blood Glucose.: 118 mg/dL (11 Mar 2023 16:35)           CXR:    I&O's Detail    11 Mar 2023 06:01  -  12 Mar 2023 07:00  --------------------------------------------------------  IN:    Oral Fluid: 240 mL  Total IN: 240 mL    OUT:    Chest Tube (mL): 310 mL    Voided (mL): 1900 mL  Total OUT: 2210 mL    Total NET: -1970 mL      12 Mar 2023 07:01  -  12 Mar 2023 14:57  --------------------------------------------------------  IN:    Oral Fluid: 240 mL  Total IN: 240 mL    OUT:  Total OUT: 0 mL    Total NET: 240 mL      BOWEL MOVEMENT:  [x ] YES [ ] NO      Daily     Daily Weight in k.2 (12 Mar 2023 09:34)  Medications:  acetaminophen     Tablet .. 650 milliGRAM(s) Oral every 6 hours PRN  ascorbic acid 500 milliGRAM(s) Oral two times a day  aspirin enteric coated 81 milliGRAM(s) Oral daily  atorvastatin 80 milliGRAM(s) Oral at bedtime  bisacodyl Suppository 10 milliGRAM(s) Rectal once  dorzolamide 2% Ophthalmic Solution 1 Drop(s) Both EYES <User Schedule>  enoxaparin Injectable 40 milliGRAM(s) SubCutaneous every 24 hours  furosemide    Tablet 20 milliGRAM(s) Oral daily  gabapentin 100 milliGRAM(s) Oral every 8 hours  insulin glargine Injectable (LANTUS) 15 Unit(s) SubCutaneous at bedtime  insulin lispro (ADMELOG) corrective regimen sliding scale   SubCutaneous three times a day before meals  insulin lispro (ADMELOG) corrective regimen sliding scale   SubCutaneous at bedtime  insulin lispro Injectable (ADMELOG) 5 Unit(s) SubCutaneous three times a day before meals  metoprolol tartrate 12.5 milliGRAM(s) Oral two times a day  montelukast 10 milliGRAM(s) Oral daily  oxyCODONE    IR 5 milliGRAM(s) Oral every 4 hours PRN  oxyCODONE    IR 10 milliGRAM(s) Oral every 4 hours PRN  pantoprazole    Tablet 40 milliGRAM(s) Oral before breakfast  polyethylene glycol 3350 17 Gram(s) Oral daily  senna 2 Tablet(s) Oral at bedtime  sodium chloride 0.9%. 1000 milliLiter(s) IV Continuous <Continuous>  spironolactone 25 milliGRAM(s) Oral daily  tamsulosin 0.4 milliGRAM(s) Oral at bedtime        Physical Exam:    Neurology: alert and oriented x 3    CV : RRR S1S2, no murmurs,     Sternal Wound :  CDI , Stable      Lungs: CTA B/L, No wheezing, rales, rhonchi. Good effort incentive spirometer    Abdomen: soft, NT, ND, (+)BM    :  voiding    Extremities: No LE edema bilaterally, +DP, No calf tenderness                     PAST MEDICAL & SURGICAL HISTORY:  Hip pain, right      Hypertension      Glaucoma      Retinal detachments and defects  bilateral      Macular degeneration of both eyes      DM (diabetes mellitus)      S/P hip replacement, left  2006      Recent retinal detachment, total, bilateral   (left),  (right)      S/P appendectomy      H/O total hip arthroplasty, right  Right hip replacement

## 2023-03-12 NOTE — PROGRESS NOTE ADULT - ASSESSMENT
Assessment  DMT2: 72y Male with DM T2 with hyperglycemia, A1C 6.1% , was on oral DM meds at home, s/p CABG, now off insulin drip transitioned to  basal and bolus, blood sugars are within target goal this morning, he is eating and ambulating with assistance.  CAD: on medications, stable, monitored. s/p CABG  HTN: on antihypertensive medications, monitored asymptomatic.  Obesity: No strict exercise routines, not on any weight loss program, neither on low calorie diet.        Discussed plan and management with Attending.   LLOYD Mcdaniel MD  Cell: [phone]  Office: [phone]               Assessment  DMT2: 72y Male with DM T2 with hyperglycemia, A1C 6.1% , was on oral DM meds at home, s/p CABG, now off insulin drip transitioned to  basal and bolus, blood sugars are within target goal this morning, he is eating and  ambulating with assistance.  CAD: on medications, stable, monitored. s/p CABG  HTN: on antihypertensive medications, monitored asymptomatic.  Obesity: No strict exercise routines, not on any weight loss program, neither on low calorie diet.        Discussed plan and management with Attending.   LLOYD Mcdaniel MD  Cell: [phone]  Office: [phone]

## 2023-03-12 NOTE — PROGRESS NOTE ADULT - ASSESSMENT
71 yo Man with PMHx of HTN, HLD, DMT2 (last A1C 6.2) presents for cardiac cath. pt reports he has been feeling intermittent SOB and dizziness, evaluated by Dr. Vieira and had abnormal cardiac CTA after having abnormal TTE and exercise stress test. He denies chest pain, palpitation or SOB currently.     Pt now s/p cardiac cath on 3/2/23 revealing triple vessel disease (mLAD 90%, distal Cx 80%, mRCA 80%). Ct surgery consulted for CABG eval with Dr. Torres.  likely plan for OR either Tuesday or Thursday.    3/3 VSS overnight. HR/BP stable on Lopressor 12.5mg BID. No CP/SOB.  Pending TTE today.  P2y12 187 this AM.  Repeat TSH 5 however remainder of Thyroid panel normal.  UA and MRSA PCR ordered   3/4 VSS no chest pain OR tuesday. Continue preop work up  3/5 VSS CP free OR Tuesday  3/6 Preop OR tomorrow  3/7 S/P C3L, extubated Intra-Op with intrathecal morphine given. RTOR for bleeding. 2u PRBC post op.  3/8 Transfer to SDU from CTU, + RIJ in place, + PW, + MED CTx1, + LP CT x1 , monitor drainage, + Casiano in place,   3/9 VSS D/c IJ d/c  insulin gtt d/c casiano diuresis with lasix 40 bid maintain chest tube med 360 left pleura 425 initiate  Lopressor 25  BID- Return to CTU   3/10 Moves back out to floor. VSS; LP chest tube in place. Off diuretics, BB on Hold.   3/11 L pl tube w/ 6 output Off betablocker syncope/low dejan state CXR Will start low dose diuretic  3/12  Increase betablocker Ambulate   LUIS this week  DC PW

## 2023-03-12 NOTE — PROGRESS NOTE ADULT - SUBJECTIVE AND OBJECTIVE BOX
Chief complaint  Patient is a 72y old  Male who presents with a chief complaint of C3LIMA (11 Mar 2023 08:09)         Labs and Fingersticks  CAPILLARY BLOOD GLUCOSE      POCT Blood Glucose.: 113 mg/dL (12 Mar 2023 07:44)  POCT Blood Glucose.: 104 mg/dL (11 Mar 2023 21:39)  POCT Blood Glucose.: 118 mg/dL (11 Mar 2023 16:35)  POCT Blood Glucose.: 151 mg/dL (11 Mar 2023 11:31)      Anion Gap, Serum: 11 (03-12 @ 05:29)  Anion Gap, Serum: 10 (03-11 @ 09:06)      Calcium, Total Serum: 8.2 *L* (03-12 @ 05:29)  Calcium, Total Serum: 8.1 *L* (03-11 @ 09:06)  Albumin, Serum: 3.0 *L* (03-12 @ 05:29)  Albumin, Serum: 3.2 *L* (03-11 @ 09:06)    Alanine Aminotransferase (ALT/SGPT): 22 (03-12 @ 05:29)  Alanine Aminotransferase (ALT/SGPT): 19 (03-11 @ 09:06)  Alkaline Phosphatase, Serum: 67 (03-12 @ 05:29)  Alkaline Phosphatase, Serum: 61 (03-11 @ 09:06)  Aspartate Aminotransferase (AST/SGOT): 23 (03-12 @ 05:29)  Aspartate Aminotransferase (AST/SGOT): 21 (03-11 @ 09:06)        03-12    139  |  103  |  18  ----------------------------<  105<H>  3.7   |  25  |  0.86    Ca    8.2<L>      12 Mar 2023 05:29  Phos  2.9     03-12  Mg     2.3     03-12    TPro  5.6<L>  /  Alb  3.0<L>  /  TBili  0.6  /  DBili  x   /  AST  23  /  ALT  22  /  AlkPhos  67  03-12                        8.5    7.39  )-----------( 190      ( 12 Mar 2023 05:29 )             26.5     Medications  MEDICATIONS  (STANDING):  ascorbic acid 500 milliGRAM(s) Oral two times a day  aspirin enteric coated 81 milliGRAM(s) Oral daily  atorvastatin 80 milliGRAM(s) Oral at bedtime  bisacodyl Suppository 10 milliGRAM(s) Rectal once  chlorhexidine 2% Cloths 1 Application(s) Topical daily  dorzolamide 2% Ophthalmic Solution 1 Drop(s) Both EYES <User Schedule>  enoxaparin Injectable 40 milliGRAM(s) SubCutaneous every 24 hours  furosemide    Tablet 20 milliGRAM(s) Oral daily  gabapentin 100 milliGRAM(s) Oral every 8 hours  insulin glargine Injectable (LANTUS) 15 Unit(s) SubCutaneous at bedtime  insulin lispro (ADMELOG) corrective regimen sliding scale   SubCutaneous three times a day before meals  insulin lispro (ADMELOG) corrective regimen sliding scale   SubCutaneous at bedtime  insulin lispro Injectable (ADMELOG) 5 Unit(s) SubCutaneous three times a day before meals  metoprolol tartrate 12.5 milliGRAM(s) Oral two times a day  montelukast 10 milliGRAM(s) Oral daily  pantoprazole    Tablet 40 milliGRAM(s) Oral before breakfast  polyethylene glycol 3350 17 Gram(s) Oral daily  senna 2 Tablet(s) Oral at bedtime  sodium chloride 0.9%. 1000 milliLiter(s) (10 mL/Hr) IV Continuous <Continuous>  spironolactone 25 milliGRAM(s) Oral daily  tamsulosin 0.4 milliGRAM(s) Oral at bedtime      Physical Exam  General: Patient comfortable in chair  Vital Signs Last 12 Hrs  T(F): 97.6 (03-12-23 @ 04:51), Max: 97.6 (03-12-23 @ 04:51)  HR: 86 (03-12-23 @ 04:51) (86 - 86)  BP: 117/73 (03-12-23 @ 04:51) (117/73 - 117/73)  BP(mean): --  RR: 18 (03-12-23 @ 04:51) (18 - 18)  SpO2: 97% (03-12-23 @ 04:51) (97% - 97%)    CVS: S1S2   Respiratory: No wheezing, no crepitations  GI: Abdomen soft, bowel sounds positive  Musculoskeletal:  moves all extremities  : Voiding          Chief complaint  Patient is a 72y old  Male who presents with a chief complaint of C3LIMA (11 Mar 2023 08:09)     Labs and Fingersticks  CAPILLARY BLOOD GLUCOSE      POCT Blood Glucose.: 113 mg/dL (12 Mar 2023 07:44)  POCT Blood Glucose.: 104 mg/dL (11 Mar 2023 21:39)  POCT Blood Glucose.: 118 mg/dL (11 Mar 2023 16:35)  POCT Blood Glucose.: 151 mg/dL (11 Mar 2023 11:31)      Anion Gap, Serum: 11 (03-12 @ 05:29)  Anion Gap, Serum: 10 (03-11 @ 09:06)      Calcium, Total Serum: 8.2 *L* (03-12 @ 05:29)  Calcium, Total Serum: 8.1 *L* (03-11 @ 09:06)  Albumin, Serum: 3.0 *L* (03-12 @ 05:29)  Albumin, Serum: 3.2 *L* (03-11 @ 09:06)    Alanine Aminotransferase (ALT/SGPT): 22 (03-12 @ 05:29)  Alanine Aminotransferase (ALT/SGPT): 19 (03-11 @ 09:06)  Alkaline Phosphatase, Serum: 67 (03-12 @ 05:29)  Alkaline Phosphatase, Serum: 61 (03-11 @ 09:06)  Aspartate Aminotransferase (AST/SGOT): 23 (03-12 @ 05:29)  Aspartate Aminotransferase (AST/SGOT): 21 (03-11 @ 09:06)        03-12    139  |  103  |  18  ----------------------------<  105<H>  3.7   |  25  |  0.86    Ca    8.2<L>      12 Mar 2023 05:29  Phos  2.9     03-12  Mg     2.3     03-12    TPro  5.6<L>  /  Alb  3.0<L>  /  TBili  0.6  /  DBili  x   /  AST  23  /  ALT  22  /  AlkPhos  67  03-12                        8.5    7.39  )-----------( 190      ( 12 Mar 2023 05:29 )             26.5     Medications  MEDICATIONS  (STANDING):  ascorbic acid 500 milliGRAM(s) Oral two times a day  aspirin enteric coated 81 milliGRAM(s) Oral daily  atorvastatin 80 milliGRAM(s) Oral at bedtime  bisacodyl Suppository 10 milliGRAM(s) Rectal once  chlorhexidine 2% Cloths 1 Application(s) Topical daily  dorzolamide 2% Ophthalmic Solution 1 Drop(s) Both EYES <User Schedule>  enoxaparin Injectable 40 milliGRAM(s) SubCutaneous every 24 hours  furosemide    Tablet 20 milliGRAM(s) Oral daily  gabapentin 100 milliGRAM(s) Oral every 8 hours  insulin glargine Injectable (LANTUS) 15 Unit(s) SubCutaneous at bedtime  insulin lispro (ADMELOG) corrective regimen sliding scale   SubCutaneous three times a day before meals  insulin lispro (ADMELOG) corrective regimen sliding scale   SubCutaneous at bedtime  insulin lispro Injectable (ADMELOG) 5 Unit(s) SubCutaneous three times a day before meals  metoprolol tartrate 12.5 milliGRAM(s) Oral two times a day  montelukast 10 milliGRAM(s) Oral daily  pantoprazole    Tablet 40 milliGRAM(s) Oral before breakfast  polyethylene glycol 3350 17 Gram(s) Oral daily  senna 2 Tablet(s) Oral at bedtime  sodium chloride 0.9%. 1000 milliLiter(s) (10 mL/Hr) IV Continuous <Continuous>  spironolactone 25 milliGRAM(s) Oral daily  tamsulosin 0.4 milliGRAM(s) Oral at bedtime      Physical Exam  General: Patient comfortable in chair  Vital Signs Last 12 Hrs  T(F): 97.6 (03-12-23 @ 04:51), Max: 97.6 (03-12-23 @ 04:51)  HR: 86 (03-12-23 @ 04:51) (86 - 86)  BP: 117/73 (03-12-23 @ 04:51) (117/73 - 117/73)  BP(mean): --  RR: 18 (03-12-23 @ 04:51) (18 - 18)  SpO2: 97% (03-12-23 @ 04:51) (97% - 97%)    CVS: S1S2   Respiratory: No wheezing, no crepitations  GI: Abdomen soft, bowel sounds positive  Musculoskeletal:  moves all extremities  : Voiding

## 2023-03-13 ENCOUNTER — TRANSCRIPTION ENCOUNTER (OUTPATIENT)
Age: 73
End: 2023-03-13

## 2023-03-13 LAB
ALBUMIN SERPL ELPH-MCNC: 3.4 G/DL — SIGNIFICANT CHANGE UP (ref 3.3–5)
ALP SERPL-CCNC: 90 U/L — SIGNIFICANT CHANGE UP (ref 40–120)
ALT FLD-CCNC: 39 U/L — SIGNIFICANT CHANGE UP (ref 10–45)
ANION GAP SERPL CALC-SCNC: 12 MMOL/L — SIGNIFICANT CHANGE UP (ref 5–17)
AST SERPL-CCNC: 37 U/L — SIGNIFICANT CHANGE UP (ref 10–40)
BASOPHILS # BLD AUTO: 0.04 K/UL — SIGNIFICANT CHANGE UP (ref 0–0.2)
BASOPHILS NFR BLD AUTO: 0.5 % — SIGNIFICANT CHANGE UP (ref 0–2)
BILIRUB SERPL-MCNC: 0.6 MG/DL — SIGNIFICANT CHANGE UP (ref 0.2–1.2)
BUN SERPL-MCNC: 20 MG/DL — SIGNIFICANT CHANGE UP (ref 7–23)
CALCIUM SERPL-MCNC: 8.4 MG/DL — SIGNIFICANT CHANGE UP (ref 8.4–10.5)
CHLORIDE SERPL-SCNC: 103 MMOL/L — SIGNIFICANT CHANGE UP (ref 96–108)
CO2 SERPL-SCNC: 23 MMOL/L — SIGNIFICANT CHANGE UP (ref 22–31)
CREAT SERPL-MCNC: 0.91 MG/DL — SIGNIFICANT CHANGE UP (ref 0.5–1.3)
EGFR: 88 ML/MIN/1.73M2 — SIGNIFICANT CHANGE UP
EOSINOPHIL # BLD AUTO: 0.15 K/UL — SIGNIFICANT CHANGE UP (ref 0–0.5)
EOSINOPHIL NFR BLD AUTO: 2 % — SIGNIFICANT CHANGE UP (ref 0–6)
GLUCOSE BLDC GLUCOMTR-MCNC: 102 MG/DL — HIGH (ref 70–99)
GLUCOSE BLDC GLUCOMTR-MCNC: 108 MG/DL — HIGH (ref 70–99)
GLUCOSE BLDC GLUCOMTR-MCNC: 114 MG/DL — HIGH (ref 70–99)
GLUCOSE BLDC GLUCOMTR-MCNC: 123 MG/DL — HIGH (ref 70–99)
GLUCOSE SERPL-MCNC: 109 MG/DL — HIGH (ref 70–99)
HCT VFR BLD CALC: 27.9 % — LOW (ref 39–50)
HGB BLD-MCNC: 9.1 G/DL — LOW (ref 13–17)
IMM GRANULOCYTES NFR BLD AUTO: 1.1 % — HIGH (ref 0–0.9)
LYMPHOCYTES # BLD AUTO: 1.41 K/UL — SIGNIFICANT CHANGE UP (ref 1–3.3)
LYMPHOCYTES # BLD AUTO: 18.8 % — SIGNIFICANT CHANGE UP (ref 13–44)
MAGNESIUM SERPL-MCNC: 2.2 MG/DL — SIGNIFICANT CHANGE UP (ref 1.6–2.6)
MCHC RBC-ENTMCNC: 29 PG — SIGNIFICANT CHANGE UP (ref 27–34)
MCHC RBC-ENTMCNC: 32.6 GM/DL — SIGNIFICANT CHANGE UP (ref 32–36)
MCV RBC AUTO: 88.9 FL — SIGNIFICANT CHANGE UP (ref 80–100)
MONOCYTES # BLD AUTO: 1.07 K/UL — HIGH (ref 0–0.9)
MONOCYTES NFR BLD AUTO: 14.3 % — HIGH (ref 2–14)
NEUTROPHILS # BLD AUTO: 4.75 K/UL — SIGNIFICANT CHANGE UP (ref 1.8–7.4)
NEUTROPHILS NFR BLD AUTO: 63.3 % — SIGNIFICANT CHANGE UP (ref 43–77)
NRBC # BLD: 0 /100 WBCS — SIGNIFICANT CHANGE UP (ref 0–0)
PHOSPHATE SERPL-MCNC: 3.4 MG/DL — SIGNIFICANT CHANGE UP (ref 2.5–4.5)
PLATELET # BLD AUTO: 229 K/UL — SIGNIFICANT CHANGE UP (ref 150–400)
POTASSIUM SERPL-MCNC: 3.6 MMOL/L — SIGNIFICANT CHANGE UP (ref 3.5–5.3)
POTASSIUM SERPL-SCNC: 3.6 MMOL/L — SIGNIFICANT CHANGE UP (ref 3.5–5.3)
PROT SERPL-MCNC: 6.2 G/DL — SIGNIFICANT CHANGE UP (ref 6–8.3)
RBC # BLD: 3.14 M/UL — LOW (ref 4.2–5.8)
RBC # FLD: 14.1 % — SIGNIFICANT CHANGE UP (ref 10.3–14.5)
SARS-COV-2 RNA SPEC QL NAA+PROBE: SIGNIFICANT CHANGE UP
SODIUM SERPL-SCNC: 138 MMOL/L — SIGNIFICANT CHANGE UP (ref 135–145)
WBC # BLD: 7.5 K/UL — SIGNIFICANT CHANGE UP (ref 3.8–10.5)
WBC # FLD AUTO: 7.5 K/UL — SIGNIFICANT CHANGE UP (ref 3.8–10.5)

## 2023-03-13 RX ORDER — METOPROLOL TARTRATE 50 MG
25 TABLET ORAL EVERY 8 HOURS
Refills: 0 | Status: DISCONTINUED | OUTPATIENT
Start: 2023-03-13 | End: 2023-03-14

## 2023-03-13 RX ORDER — POTASSIUM CHLORIDE 20 MEQ
20 PACKET (EA) ORAL ONCE
Refills: 0 | Status: COMPLETED | OUTPATIENT
Start: 2023-03-13 | End: 2023-03-13

## 2023-03-13 RX ADMIN — Medication 5 UNIT(S): at 07:46

## 2023-03-13 RX ADMIN — SPIRONOLACTONE 25 MILLIGRAM(S): 25 TABLET, FILM COATED ORAL at 05:04

## 2023-03-13 RX ADMIN — PANTOPRAZOLE SODIUM 40 MILLIGRAM(S): 20 TABLET, DELAYED RELEASE ORAL at 05:04

## 2023-03-13 RX ADMIN — Medication 81 MILLIGRAM(S): at 13:53

## 2023-03-13 RX ADMIN — TAMSULOSIN HYDROCHLORIDE 0.4 MILLIGRAM(S): 0.4 CAPSULE ORAL at 21:52

## 2023-03-13 RX ADMIN — ATORVASTATIN CALCIUM 80 MILLIGRAM(S): 80 TABLET, FILM COATED ORAL at 21:52

## 2023-03-13 RX ADMIN — MONTELUKAST 10 MILLIGRAM(S): 4 TABLET, CHEWABLE ORAL at 13:54

## 2023-03-13 RX ADMIN — ENOXAPARIN SODIUM 40 MILLIGRAM(S): 100 INJECTION SUBCUTANEOUS at 13:54

## 2023-03-13 RX ADMIN — DORZOLAMIDE HYDROCHLORIDE 1 DROP(S): 20 SOLUTION/ DROPS OPHTHALMIC at 21:52

## 2023-03-13 RX ADMIN — DORZOLAMIDE HYDROCHLORIDE 1 DROP(S): 20 SOLUTION/ DROPS OPHTHALMIC at 11:22

## 2023-03-13 RX ADMIN — Medication 20 MILLIGRAM(S): at 05:04

## 2023-03-13 RX ADMIN — INSULIN GLARGINE 15 UNIT(S): 100 INJECTION, SOLUTION SUBCUTANEOUS at 22:32

## 2023-03-13 RX ADMIN — Medication 5 UNIT(S): at 11:21

## 2023-03-13 RX ADMIN — Medication 25 MILLIGRAM(S): at 21:52

## 2023-03-13 RX ADMIN — Medication 25 MILLIGRAM(S): at 05:04

## 2023-03-13 RX ADMIN — Medication 5 UNIT(S): at 16:52

## 2023-03-13 RX ADMIN — Medication 25 MILLIGRAM(S): at 13:54

## 2023-03-13 RX ADMIN — Medication 20 MILLIEQUIVALENT(S): at 07:47

## 2023-03-13 NOTE — DISCHARGE NOTE PROVIDER - NSDCMRMEDTOKEN_GEN_ALL_CORE_FT
Aspirin Enteric Coated 81 mg oral delayed release tablet: 1 tab(s) orally once a day  atorvastatin 10 mg oral tablet: 1 tab(s) orally once a day  cilostazol 50 mg oral tablet: 1 tab(s) orally 2 times a day  dorzolamide 2% ophthalmic solution: 1 drop(s) to each affected eye 2 times a day  metFORMIN 500 mg oral tablet, extended release: 1 tab(s) orally once a day  montelukast 10 mg oral tablet: 1 tab(s) orally once a day  ramipril 2.5 mg oral capsule: 1 cap(s) orally once a day  tamsulosin 0.4 mg oral capsule: 1 cap(s) orally once a day   acetaminophen: 2 tab(s) orally every 6 hours, As Needed  ascorbic acid 500 mg oral tablet: 1 tab(s) orally once a day  Aspirin Enteric Coated 81 mg oral delayed release tablet: 1 tab(s) orally once a day  atorvastatin 10 mg oral tablet: 1 tab(s) orally once a day  cilostazol 50 mg oral tablet: 1 tab(s) orally 2 times a day  dorzolamide 2% ophthalmic solution: 1 drop(s) to each affected eye 2 times a day  ferrous sulfate 325 mg (65 mg elemental iron) oral tablet: 1 tab(s) orally once a day  folic acid 1 mg oral tablet: 1 tab(s) orally once a day  furosemide 40 mg oral tablet: 1 tab(s) orally 2 times a day  insulin glargine 100 units/mL subcutaneous solution: 15 unit(s) subcutaneous once a day (at bedtime)  metFORMIN 500 mg oral tablet, extended release: 1 tab(s) orally once a day  metoprolol tartrate 25 mg oral tablet: 1 tab(s) orally every 8 hours  montelukast 10 mg oral tablet: 1 tab(s) orally once a day  oxyCODONE 5 mg oral tablet: 1 tab(s) orally every 4 hours, As needed, Moderate Pain (4 - 6)  ramipril 2.5 mg oral capsule: 1 cap(s) orally once a day  senna leaf extract oral tablet: 2 tab(s) orally once a day (at bedtime)  spironolactone 25 mg oral tablet: 1 tab(s) orally once a day  tamsulosin 0.4 mg oral capsule: 1 cap(s) orally once a day   acetaminophen: 2 tab(s) orally every 6 hours, As Needed  ascorbic acid 500 mg oral tablet: 1 tab(s) orally once a day  Aspirin Enteric Coated 81 mg oral delayed release tablet: 1 tab(s) orally once a day  atorvastatin 10 mg oral tablet: 1 tab(s) orally once a day  dorzolamide 2% ophthalmic solution: 1 drop(s) to each affected eye 2 times a day  ferrous sulfate 325 mg (65 mg elemental iron) oral tablet: 1 tab(s) orally once a day  folic acid 1 mg oral tablet: 1 tab(s) orally once a day  furosemide 40 mg oral tablet: 1 tab(s) orally 2 times a day  insulin glargine 100 units/mL subcutaneous solution: 12 unit(s) subcutaneous once a day (at bedtime)  metoprolol tartrate 25 mg oral tablet: 1 tab(s) orally every 8 hours  montelukast 10 mg oral tablet: 1 tab(s) orally once a day  oxyCODONE 5 mg oral tablet: 1 tab(s) orally every 4 hours, As needed, Moderate Pain (4 - 6)  potassium chloride 10 mEq oral capsule, extended release: 1 cap(s) orally once a day  senna leaf extract oral tablet: 2 tab(s) orally once a day (at bedtime)  spironolactone 25 mg oral tablet: 1 tab(s) orally once a day  tamsulosin 0.4 mg oral capsule: 1 cap(s) orally once a day

## 2023-03-13 NOTE — DISCHARGE NOTE PROVIDER - NSDCFUSCHEDAPPT_GEN_ALL_CORE_FT
[facility]  UROLOGY [address] [facility]  Scheduled Appointment: 04/18/2023

## 2023-03-13 NOTE — PROGRESS NOTE ADULT - ASSESSMENT
71 yo Man with PMHx of HTN, HLD, DMT2 (last A1C 6.2) presents for cardiac cath. pt reports he has been feeling intermittent SOB and dizziness, evaluated by Dr. Vieira and had abnormal cardiac CTA after having abnormal TTE and exercise stress test. He denies chest pain, palpitation or SOB currently.     Pt now s/p cardiac cath on 3/2/23 revealing triple vessel disease (mLAD 90%, distal Cx 80%, mRCA 80%). Ct surgery consulted for CABG eval with Dr. Torres.  likely plan for OR either Tuesday or Thursday.    3/3 VSS overnight. HR/BP stable on Lopressor 12.5mg BID. No CP/SOB.  Pending TTE today.  P2y12 187 this AM.  Repeat TSH 5 however remainder of Thyroid panel normal.  UA and MRSA PCR ordered   3/4 VSS no chest pain OR tuesday. Continue preop work up  3/5 VSS CP free OR Tuesday  3/6 Preop OR tomorrow  3/7 S/P C3L, extubated Intra-Op with intrathecal morphine given. RTOR for bleeding. 2u PRBC post op.  3/8 Transfer to SDU from CTU, + RIJ in place, + PW, + MED CTx1, + LP CT x1 , monitor drainage, + Casiano in place,   3/9 VSS D/c IJ d/c  insulin gtt d/c casiano diuresis with lasix 40 bid maintain chest tube med 360 left pleura 425 initiate  Lopressor 25  BID- Return to CTU   3/10 Moves back out to floor. VSS; LP chest tube in place. Off diuretics, BB on Hold.   3/11 L pl tube w/ 6 output Off betablocker syncope/low dejan state CXR Will start low dose diuretic  3/12  Increase betablocker Ambulate   LUIS this week  DC PW 71 yo Man with PMHx of HTN, HLD, DMT2 (last A1C 6.2) presents for cardiac cath. pt reports he has been feeling intermittent SOB and dizziness, evaluated by Dr. Vieira and had abnormal cardiac CTA after having abnormal TTE and exercise stress test. He denies chest pain, palpitation or SOB currently.     Pt now s/p cardiac cath on 3/2/23 revealing triple vessel disease (mLAD 90%, distal Cx 80%, mRCA 80%). Ct surgery consulted for CABG eval with Dr. Torres.  likely plan for OR either Tuesday or Thursday.    3/3 VSS overnight. HR/BP stable on Lopressor 12.5mg BID. No CP/SOB.  Pending TTE today.  P2y12 187 this AM.  Repeat TSH 5 however remainder of Thyroid panel normal.  UA and MRSA PCR ordered   3/4 VSS no chest pain OR tuesday. Continue preop work up  3/5 VSS CP free OR Tuesday  3/6 Preop OR tomorrow  3/7 S/P C3L, extubated Intra-Op with intrathecal morphine given. RTOR for bleeding. 2u PRBC post op.  3/8 Transfer to SDU from CTU, + RIJ in place, + PW, + MED CTx1, + LP CT x1 , monitor drainage, + Casiano in place,   3/9 VSS D/c IJ d/c  insulin gtt d/c casiano diuresis with lasix 40 bid maintain chest tube med 360 left pleura 425 initiate  Lopressor 25  BID- Return to CTU   3/10 Moves back out to floor. VSS; LP chest tube in place. Off diuretics, BB on Hold.   3/11 L pl tube w/ 6 output Off betablocker syncope/low dejan state CXR Will start low dose diuretic  3/12  Increase betablocker Ambulate   LUIS this week  DC PW  3/13  lop to 25 q8      pervena off  neg 1800

## 2023-03-13 NOTE — PROGRESS NOTE ADULT - ASSESSMENT
Assessment  DMT2: 72y Male with DM T2 with hyperglycemia, A1C 6.1% , was on oral DM meds at home, s/p CABG, now off insulin drip transitioned to  basal and bolus, blood sugars are within target goal this morning, he is eating and ambulating with assistance. DC planning.   CAD: on medications, stable, monitored. s/p CABG  HTN: on antihypertensive medications, monitored asymptomatic.  Obesity: No strict exercise routines, not on any weight loss program, neither on low calorie diet.        Discussed plan and management with Attending.   LLOYD Mcdaniel MD  Cell: [phone]  Office: [phone]               Assessment  DMT2: 72y Male with DM T2 with hyperglycemia, A1C 6.1% , was on oral DM meds at home, s/p CABG, now off insulin drip transitioned to  basal and bolus, blood sugars are within target goal this morning, he is eating  and ambulating with assistance. DC planning.   CAD: on medications, stable, monitored. s/p CABG  HTN: on antihypertensive medications, monitored asymptomatic.  Obesity: No strict exercise routines, not on any weight loss program, neither on low calorie diet.        Discussed plan and management with Attending.   LLOYD Mcdaniel MD  Cell: [phone]  Office: [phone]

## 2023-03-13 NOTE — DISCHARGE NOTE PROVIDER - CARE PROVIDER_API CALL
Miguel A Torres)  CTS Surgery  [address] [place] [address]  Phone: [phone]  Fax: [phone]  Follow Up Time:

## 2023-03-13 NOTE — PROGRESS NOTE ADULT - PROBLEM SELECTOR PLAN 1
c/w ASA 81QD, Ator 81mg  Lopressor  25 bid  DC PW   Diuretics leg edema    LUIS c/w ASA 81QD, Ator 81mg  Lopressor  25   q8  Diuretics leg edema  pervena off    home   tues

## 2023-03-13 NOTE — DISCHARGE NOTE PROVIDER - HOSPITAL COURSE
3/5 VSS CP free OR Tuesday  3/6 Preop OR tomorrow  3/7 S/P C3L, extubated Intra-Op with intrathecal morphine given. RTOR for bleeding. 2u PRBC post op.  3/8 Transfer to SDU from CTU, + RIJ in place, + PW, + MED CTx1, + LP CT x1 , monitor drainage, + Casiano in place,   3/9 VSS D/c IJ d/c  insulin gtt d/c casiano diuresis with lasix 40 bid maintain chest tube med 360 left pleura 425 initiate  Lopressor 25  BID- Return to CTU   3/10 Moves back out to floor. VSS; LP chest tube in place. Off diuretics, BB on Hold.   3/11 L pl tube w/ 6 output Off betablocker syncope/low dejan state CXR Will start low dose diuretic  3/12  Increase betablocker Ambulate   LUIS this week  DC PW  3/13  lop to 25 q8      pervena off  neg 1800

## 2023-03-13 NOTE — PROGRESS NOTE ADULT - SUBJECTIVE AND OBJECTIVE BOX
Chief complaint  Patient is a 72y old  Male who presents with a chief complaint of sp    C3L (13 Mar 2023 07:34)         Labs and Fingersticks  CAPILLARY BLOOD GLUCOSE      POCT Blood Glucose.: 102 mg/dL (13 Mar 2023 07:27)  POCT Blood Glucose.: 124 mg/dL (12 Mar 2023 21:36)  POCT Blood Glucose.: 108 mg/dL (12 Mar 2023 16:34)  POCT Blood Glucose.: 110 mg/dL (12 Mar 2023 11:21)      Anion Gap, Serum: 12 (03-13 @ 05:26)  Anion Gap, Serum: 11 (03-12 @ 05:29)      Calcium, Total Serum: 8.4 (03-13 @ 05:26)  Calcium, Total Serum: 8.2 *L* (03-12 @ 05:29)  Albumin, Serum: 3.4 (03-13 @ 05:26)  Albumin, Serum: 3.0 *L* (03-12 @ 05:29)    Alanine Aminotransferase (ALT/SGPT): 39 (03-13 @ 05:26)  Alanine Aminotransferase (ALT/SGPT): 22 (03-12 @ 05:29)  Alkaline Phosphatase, Serum: 90 (03-13 @ 05:26)  Alkaline Phosphatase, Serum: 67 (03-12 @ 05:29)  Aspartate Aminotransferase (AST/SGOT): 37 (03-13 @ 05:26)  Aspartate Aminotransferase (AST/SGOT): 23 (03-12 @ 05:29)        03-13    138  |  103  |  20  ----------------------------<  109<H>  3.6   |  23  |  0.91    Ca    8.4      13 Mar 2023 05:26  Phos  3.4     03-13  Mg     2.2     03-13    TPro  6.2  /  Alb  3.4  /  TBili  0.6  /  DBili  x   /  AST  37  /  ALT  39  /  AlkPhos  90  03-13                        9.1    7.50  )-----------( 229      ( 13 Mar 2023 05:26 )             27.9     Medications  MEDICATIONS  (STANDING):  aspirin enteric coated 81 milliGRAM(s) Oral daily  atorvastatin 80 milliGRAM(s) Oral at bedtime  bisacodyl Suppository 10 milliGRAM(s) Rectal once  dorzolamide 2% Ophthalmic Solution 1 Drop(s) Both EYES <User Schedule>  enoxaparin Injectable 40 milliGRAM(s) SubCutaneous every 24 hours  furosemide    Tablet 20 milliGRAM(s) Oral daily  insulin glargine Injectable (LANTUS) 15 Unit(s) SubCutaneous at bedtime  insulin lispro (ADMELOG) corrective regimen sliding scale   SubCutaneous three times a day before meals  insulin lispro (ADMELOG) corrective regimen sliding scale   SubCutaneous at bedtime  insulin lispro Injectable (ADMELOG) 5 Unit(s) SubCutaneous three times a day before meals  metoprolol tartrate 25 milliGRAM(s) Oral every 8 hours  montelukast 10 milliGRAM(s) Oral daily  pantoprazole    Tablet 40 milliGRAM(s) Oral before breakfast  polyethylene glycol 3350 17 Gram(s) Oral daily  senna 2 Tablet(s) Oral at bedtime  sodium chloride 0.9%. 1000 milliLiter(s) (10 mL/Hr) IV Continuous <Continuous>  spironolactone 25 milliGRAM(s) Oral daily  tamsulosin 0.4 milliGRAM(s) Oral at bedtime      Physical Exam  General: Patient comfortable in bed   Vital Signs Last 12 Hrs  T(F): 98 (03-13-23 @ 04:55), Max: 98 (03-13-23 @ 04:55)  HR: 88 (03-13-23 @ 04:55) (88 - 88)  BP: 134/69 (03-13-23 @ 04:55) (134/69 - 134/69)  BP(mean): --  RR: 18 (03-13-23 @ 04:55) (18 - 18)  SpO2: 94% (03-13-23 @ 04:55) (94% - 94%)    CVS: S1S2   Respiratory: No wheezing, no crepitations  GI: Abdomen soft, bowel sounds positive  Musculoskeletal:  moves all extremities  : Voiding          Chief complaint  Patient is a 72y old  Male who presents with a chief complaint of sp    C3L (13 Mar 2023 07:34)     Labs and Fingersticks  CAPILLARY BLOOD GLUCOSE      POCT Blood Glucose.: 102 mg/dL (13 Mar 2023 07:27)  POCT Blood Glucose.: 124 mg/dL (12 Mar 2023 21:36)  POCT Blood Glucose.: 108 mg/dL (12 Mar 2023 16:34)  POCT Blood Glucose.: 110 mg/dL (12 Mar 2023 11:21)      Anion Gap, Serum: 12 (03-13 @ 05:26)  Anion Gap, Serum: 11 (03-12 @ 05:29)      Calcium, Total Serum: 8.4 (03-13 @ 05:26)  Calcium, Total Serum: 8.2 *L* (03-12 @ 05:29)  Albumin, Serum: 3.4 (03-13 @ 05:26)  Albumin, Serum: 3.0 *L* (03-12 @ 05:29)    Alanine Aminotransferase (ALT/SGPT): 39 (03-13 @ 05:26)  Alanine Aminotransferase (ALT/SGPT): 22 (03-12 @ 05:29)  Alkaline Phosphatase, Serum: 90 (03-13 @ 05:26)  Alkaline Phosphatase, Serum: 67 (03-12 @ 05:29)  Aspartate Aminotransferase (AST/SGOT): 37 (03-13 @ 05:26)  Aspartate Aminotransferase (AST/SGOT): 23 (03-12 @ 05:29)        03-13    138  |  103  |  20  ----------------------------<  109<H>  3.6   |  23  |  0.91    Ca    8.4      13 Mar 2023 05:26  Phos  3.4     03-13  Mg     2.2     03-13    TPro  6.2  /  Alb  3.4  /  TBili  0.6  /  DBili  x   /  AST  37  /  ALT  39  /  AlkPhos  90  03-13                        9.1    7.50  )-----------( 229      ( 13 Mar 2023 05:26 )             27.9     Medications  MEDICATIONS  (STANDING):  aspirin enteric coated 81 milliGRAM(s) Oral daily  atorvastatin 80 milliGRAM(s) Oral at bedtime  bisacodyl Suppository 10 milliGRAM(s) Rectal once  dorzolamide 2% Ophthalmic Solution 1 Drop(s) Both EYES <User Schedule>  enoxaparin Injectable 40 milliGRAM(s) SubCutaneous every 24 hours  furosemide    Tablet 20 milliGRAM(s) Oral daily  insulin glargine Injectable (LANTUS) 15 Unit(s) SubCutaneous at bedtime  insulin lispro (ADMELOG) corrective regimen sliding scale   SubCutaneous three times a day before meals  insulin lispro (ADMELOG) corrective regimen sliding scale   SubCutaneous at bedtime  insulin lispro Injectable (ADMELOG) 5 Unit(s) SubCutaneous three times a day before meals  metoprolol tartrate 25 milliGRAM(s) Oral every 8 hours  montelukast 10 milliGRAM(s) Oral daily  pantoprazole    Tablet 40 milliGRAM(s) Oral before breakfast  polyethylene glycol 3350 17 Gram(s) Oral daily  senna 2 Tablet(s) Oral at bedtime  sodium chloride 0.9%. 1000 milliLiter(s) (10 mL/Hr) IV Continuous <Continuous>  spironolactone 25 milliGRAM(s) Oral daily  tamsulosin 0.4 milliGRAM(s) Oral at bedtime      Physical Exam  General: Patient comfortable in bed   Vital Signs Last 12 Hrs  T(F): 98 (03-13-23 @ 04:55), Max: 98 (03-13-23 @ 04:55)  HR: 88 (03-13-23 @ 04:55) (88 - 88)  BP: 134/69 (03-13-23 @ 04:55) (134/69 - 134/69)  BP(mean): --  RR: 18 (03-13-23 @ 04:55) (18 - 18)  SpO2: 94% (03-13-23 @ 04:55) (94% - 94%)    CVS: S1S2   Respiratory: No wheezing, no crepitations  GI: Abdomen soft, bowel sounds positive  Musculoskeletal:  moves all extremities  : Voiding

## 2023-03-13 NOTE — PROGRESS NOTE ADULT - PROBLEM SELECTOR PLAN 1
Will continue current insulin regimen for now.   Will continue monitoring FS, log, and glucose trends, will Follow up.  Patient counseled for compliance with consistent low carb diet and exercise as tolerated outpatient.    Suggest can get DC on Janumet 50/1000 BID. FU outpatient 2-4 weeks.

## 2023-03-13 NOTE — PROGRESS NOTE ADULT - SUBJECTIVE AND OBJECTIVE BOX
VITAL SIGNS    Telemetry:      Vital Signs Last 24 Hrs  T(C): 36.7 (23 @ 04:55), Max: 36.9 (23 @ 12:00)  T(F): 98 (23 @ 04:55), Max: 98.5 (23 @ 12:00)  HR: 88 (23 @ 04:55) (82 - 91)  BP: 134/69 (23 @ 04:55) (104/67 - 134/69)  RR: 18 (23 @ 04:55) (18 - 18)  SpO2: 94% (23 @ 04:55) (94% - 97%)                   Daily     Daily Weight in k.2 (12 Mar 2023 09:34)      Bilirubin Total, Serum: 0.6 mg/dL ( @ 05:26)    CAPILLARY BLOOD GLUCOSE      POCT Blood Glucose.: 102 mg/dL (13 Mar 2023 07:27)  POCT Blood Glucose.: 124 mg/dL (12 Mar 2023 21:36)  POCT Blood Glucose.: 108 mg/dL (12 Mar 2023 16:34)  POCT Blood Glucose.: 110 mg/dL (12 Mar 2023 11:21)  POCT Blood Glucose.: 113 mg/dL (12 Mar 2023 07:44)          Drains:     MS         [  ] Drainage:                 L Pleural  [  ]  Drainage:                R Pleural  [  ]  Drainage:    Pacing Wires        [  ]   Settings:                                  Isolated  [  ]    Coumadin    [ ] YES          [  ]      NO         Reason:                         PHYSICAL EXAM    Neurology: alert and oriented x 3, moves all extremities with no defecits  CV :  RRR  Sternal Wound :  CDI , Stable  Lungs:   CTA B/L  Abdomen: soft, nontender, nondistended, positive bowel sounds, last bowel movement   Extremities:                                            VITAL SIGNS    Telemetry:     sr 90    Vital Signs Last 24 Hrs  T(C): 36.7 (23 @ 04:55), Max: 36.9 (23 @ 12:00)  T(F): 98 (23 @ 04:55), Max: 98.5 (23 @ 12:00)  HR: 88 (23 @ 04:55) (82 - 91)  BP: 134/69 (23 @ 04:55) (104/67 - 134/69)  RR: 18 (23 @ 04:55) (18 - 18)  SpO2: 94% (23 @ 04:55) (94% - 97%)                   Daily     Daily Weight in k.2 (12 Mar 2023 09:34)      Bilirubin Total, Serum: 0.6 mg/dL ( @ 05:26)    CAPILLARY BLOOD GLUCOSE      POCT Blood Glucose.: 102 mg/dL (13 Mar 2023 07:27)  POCT Blood Glucose.: 124 mg/dL (12 Mar 2023 21:36)  POCT Blood Glucose.: 108 mg/dL (12 Mar 2023 16:34)  POCT Blood Glucose.: 110 mg/dL (12 Mar 2023 11:21)  POCT Blood Glucose.: 113 mg/dL (12 Mar 2023 07:44)            Pacing Wires        [  ]   Settings:                                  Isolated  [  ]    Coumadin    [ ] YES          [  ]      NO         Reason:                         PHYSICAL EXAM    Neurology: alert and oriented x 3, moves all extremities with no defecits  CV :  RRR  Sternal Wound :  CDI , Stable  Lungs:   CTA B/L  Abdomen: soft, nontender, nondistended, positive bowel sounds, last bowel movement   Extremities:

## 2023-03-13 NOTE — DISCHARGE NOTE PROVIDER - NSDCFUADDINST_GEN_ALL_CORE_FT
Rehab  Please draw  patient BMP   to assess creatinine and Potassium level 2x week while on diuresis Rehab  Please draw  patient BMP   to assess creatinine and Potassium level 2x week while on diuresis     Please check   Blood sugar 4x day   before every meal  and at bedtime

## 2023-03-14 ENCOUNTER — NON-APPOINTMENT (OUTPATIENT)
Age: 73
End: 2023-03-14

## 2023-03-14 ENCOUNTER — TRANSCRIPTION ENCOUNTER (OUTPATIENT)
Age: 73
End: 2023-03-14

## 2023-03-14 VITALS
TEMPERATURE: 98 F | DIASTOLIC BLOOD PRESSURE: 69 MMHG | OXYGEN SATURATION: 97 % | HEART RATE: 74 BPM | SYSTOLIC BLOOD PRESSURE: 123 MMHG | RESPIRATION RATE: 18 BRPM

## 2023-03-14 LAB
ANION GAP SERPL CALC-SCNC: 12 MMOL/L — SIGNIFICANT CHANGE UP (ref 5–17)
BUN SERPL-MCNC: 18 MG/DL — SIGNIFICANT CHANGE UP (ref 7–23)
CALCIUM SERPL-MCNC: 8.6 MG/DL — SIGNIFICANT CHANGE UP (ref 8.4–10.5)
CHLORIDE SERPL-SCNC: 102 MMOL/L — SIGNIFICANT CHANGE UP (ref 96–108)
CO2 SERPL-SCNC: 22 MMOL/L — SIGNIFICANT CHANGE UP (ref 22–31)
CREAT SERPL-MCNC: 0.9 MG/DL — SIGNIFICANT CHANGE UP (ref 0.5–1.3)
EGFR: 90 ML/MIN/1.73M2 — SIGNIFICANT CHANGE UP
GLUCOSE BLDC GLUCOMTR-MCNC: 105 MG/DL — HIGH (ref 70–99)
GLUCOSE SERPL-MCNC: 103 MG/DL — HIGH (ref 70–99)
HCT VFR BLD CALC: 26 % — LOW (ref 39–50)
HGB BLD-MCNC: 8.4 G/DL — LOW (ref 13–17)
MCHC RBC-ENTMCNC: 28.8 PG — SIGNIFICANT CHANGE UP (ref 27–34)
MCHC RBC-ENTMCNC: 32.3 GM/DL — SIGNIFICANT CHANGE UP (ref 32–36)
MCV RBC AUTO: 89 FL — SIGNIFICANT CHANGE UP (ref 80–100)
NRBC # BLD: 0 /100 WBCS — SIGNIFICANT CHANGE UP (ref 0–0)
PLATELET # BLD AUTO: 281 K/UL — SIGNIFICANT CHANGE UP (ref 150–400)
POTASSIUM SERPL-MCNC: 3.9 MMOL/L — SIGNIFICANT CHANGE UP (ref 3.5–5.3)
POTASSIUM SERPL-SCNC: 3.9 MMOL/L — SIGNIFICANT CHANGE UP (ref 3.5–5.3)
RBC # BLD: 2.92 M/UL — LOW (ref 4.2–5.8)
RBC # FLD: 14.3 % — SIGNIFICANT CHANGE UP (ref 10.3–14.5)
SODIUM SERPL-SCNC: 136 MMOL/L — SIGNIFICANT CHANGE UP (ref 135–145)
WBC # BLD: 8.88 K/UL — SIGNIFICANT CHANGE UP (ref 3.8–10.5)
WBC # FLD AUTO: 8.88 K/UL — SIGNIFICANT CHANGE UP (ref 3.8–10.5)

## 2023-03-14 PROCEDURE — 97116 GAIT TRAINING THERAPY: CPT

## 2023-03-14 PROCEDURE — 87637 SARSCOV2&INF A&B&RSV AMP PRB: CPT

## 2023-03-14 PROCEDURE — 85730 THROMBOPLASTIN TIME PARTIAL: CPT

## 2023-03-14 PROCEDURE — 82435 ASSAY OF BLOOD CHLORIDE: CPT

## 2023-03-14 PROCEDURE — P9100: CPT

## 2023-03-14 PROCEDURE — 82962 GLUCOSE BLOOD TEST: CPT

## 2023-03-14 PROCEDURE — 93880 EXTRACRANIAL BILAT STUDY: CPT

## 2023-03-14 PROCEDURE — 80048 BASIC METABOLIC PNL TOTAL CA: CPT

## 2023-03-14 PROCEDURE — 84100 ASSAY OF PHOSPHORUS: CPT

## 2023-03-14 PROCEDURE — 71045 X-RAY EXAM CHEST 1 VIEW: CPT | Mod: 26

## 2023-03-14 PROCEDURE — 86923 COMPATIBILITY TEST ELECTRIC: CPT

## 2023-03-14 PROCEDURE — 85384 FIBRINOGEN ACTIVITY: CPT

## 2023-03-14 PROCEDURE — 85610 PROTHROMBIN TIME: CPT

## 2023-03-14 PROCEDURE — P9045: CPT

## 2023-03-14 PROCEDURE — 84436 ASSAY OF TOTAL THYROXINE: CPT

## 2023-03-14 PROCEDURE — 82330 ASSAY OF CALCIUM: CPT

## 2023-03-14 PROCEDURE — 84443 ASSAY THYROID STIM HORMONE: CPT

## 2023-03-14 PROCEDURE — 93005 ELECTROCARDIOGRAM TRACING: CPT

## 2023-03-14 PROCEDURE — 82553 CREATINE MB FRACTION: CPT

## 2023-03-14 PROCEDURE — 82947 ASSAY GLUCOSE BLOOD QUANT: CPT

## 2023-03-14 PROCEDURE — 93306 TTE W/DOPPLER COMPLETE: CPT

## 2023-03-14 PROCEDURE — 80053 COMPREHEN METABOLIC PANEL: CPT

## 2023-03-14 PROCEDURE — 86891 AUTOLOGOUS BLOOD OP SALVAGE: CPT

## 2023-03-14 PROCEDURE — 97162 PT EVAL MOD COMPLEX 30 MIN: CPT

## 2023-03-14 PROCEDURE — 83880 ASSAY OF NATRIURETIC PEPTIDE: CPT

## 2023-03-14 PROCEDURE — 85396 CLOTTING ASSAY WHOLE BLOOD: CPT

## 2023-03-14 PROCEDURE — C9399: CPT

## 2023-03-14 PROCEDURE — 84439 ASSAY OF FREE THYROXINE: CPT

## 2023-03-14 PROCEDURE — 36415 COLL VENOUS BLD VENIPUNCTURE: CPT

## 2023-03-14 PROCEDURE — 83735 ASSAY OF MAGNESIUM: CPT

## 2023-03-14 PROCEDURE — 86965 POOLING BLOOD PLATELETS: CPT

## 2023-03-14 PROCEDURE — C1894: CPT

## 2023-03-14 PROCEDURE — 84132 ASSAY OF SERUM POTASSIUM: CPT

## 2023-03-14 PROCEDURE — 86900 BLOOD TYPING SEROLOGIC ABO: CPT

## 2023-03-14 PROCEDURE — 86850 RBC ANTIBODY SCREEN: CPT

## 2023-03-14 PROCEDURE — 83605 ASSAY OF LACTIC ACID: CPT

## 2023-03-14 PROCEDURE — 85014 HEMATOCRIT: CPT

## 2023-03-14 PROCEDURE — 82550 ASSAY OF CK (CPK): CPT

## 2023-03-14 PROCEDURE — 86901 BLOOD TYPING SEROLOGIC RH(D): CPT

## 2023-03-14 PROCEDURE — C1887: CPT

## 2023-03-14 PROCEDURE — 80076 HEPATIC FUNCTION PANEL: CPT

## 2023-03-14 PROCEDURE — 71045 X-RAY EXAM CHEST 1 VIEW: CPT

## 2023-03-14 PROCEDURE — 84484 ASSAY OF TROPONIN QUANT: CPT

## 2023-03-14 PROCEDURE — P9037: CPT

## 2023-03-14 PROCEDURE — 85027 COMPLETE CBC AUTOMATED: CPT

## 2023-03-14 PROCEDURE — 93458 L HRT ARTERY/VENTRICLE ANGIO: CPT

## 2023-03-14 PROCEDURE — P9012: CPT

## 2023-03-14 PROCEDURE — 87641 MR-STAPH DNA AMP PROBE: CPT

## 2023-03-14 PROCEDURE — U0003: CPT

## 2023-03-14 PROCEDURE — C1889: CPT

## 2023-03-14 PROCEDURE — 85520 HEPARIN ASSAY: CPT

## 2023-03-14 PROCEDURE — 94010 BREATHING CAPACITY TEST: CPT

## 2023-03-14 PROCEDURE — C1769: CPT

## 2023-03-14 PROCEDURE — 97530 THERAPEUTIC ACTIVITIES: CPT

## 2023-03-14 PROCEDURE — 81001 URINALYSIS AUTO W/SCOPE: CPT

## 2023-03-14 PROCEDURE — 83036 HEMOGLOBIN GLYCOSYLATED A1C: CPT

## 2023-03-14 PROCEDURE — 82565 ASSAY OF CREATININE: CPT

## 2023-03-14 PROCEDURE — 97165 OT EVAL LOW COMPLEX 30 MIN: CPT

## 2023-03-14 PROCEDURE — 94002 VENT MGMT INPAT INIT DAY: CPT

## 2023-03-14 PROCEDURE — 85025 COMPLETE CBC W/AUTO DIFF WBC: CPT

## 2023-03-14 PROCEDURE — 82803 BLOOD GASES ANY COMBINATION: CPT

## 2023-03-14 PROCEDURE — C1729: CPT

## 2023-03-14 PROCEDURE — 85576 BLOOD PLATELET AGGREGATION: CPT

## 2023-03-14 PROCEDURE — 84480 ASSAY TRIIODOTHYRONINE (T3): CPT

## 2023-03-14 PROCEDURE — 85018 HEMOGLOBIN: CPT

## 2023-03-14 PROCEDURE — 87640 STAPH A DNA AMP PROBE: CPT

## 2023-03-14 PROCEDURE — C1751: CPT

## 2023-03-14 PROCEDURE — 36430 TRANSFUSION BLD/BLD COMPNT: CPT

## 2023-03-14 PROCEDURE — P9059: CPT

## 2023-03-14 PROCEDURE — P9016: CPT

## 2023-03-14 PROCEDURE — U0005: CPT

## 2023-03-14 PROCEDURE — 84295 ASSAY OF SERUM SODIUM: CPT

## 2023-03-14 RX ORDER — RAMIPRIL 5 MG
1 CAPSULE ORAL
Qty: 0 | Refills: 0 | DISCHARGE

## 2023-03-14 RX ORDER — SENNA PLUS 8.6 MG/1
2 TABLET ORAL
Qty: 0 | Refills: 0 | DISCHARGE
Start: 2023-03-14

## 2023-03-14 RX ORDER — CILOSTAZOL 100 MG/1
1 TABLET ORAL
Qty: 0 | Refills: 0 | DISCHARGE

## 2023-03-14 RX ORDER — FERROUS SULFATE 325(65) MG
325 TABLET ORAL DAILY
Refills: 0 | Status: DISCONTINUED | OUTPATIENT
Start: 2023-03-14 | End: 2023-03-14

## 2023-03-14 RX ORDER — FERROUS SULFATE 325(65) MG
1 TABLET ORAL
Qty: 0 | Refills: 0 | DISCHARGE
Start: 2023-03-14

## 2023-03-14 RX ORDER — ASCORBIC ACID 60 MG
1 TABLET,CHEWABLE ORAL
Qty: 0 | Refills: 0 | DISCHARGE
Start: 2023-03-14

## 2023-03-14 RX ORDER — METFORMIN HYDROCHLORIDE 850 MG/1
1 TABLET ORAL
Qty: 0 | Refills: 0 | DISCHARGE

## 2023-03-14 RX ORDER — SPIRONOLACTONE 25 MG/1
1 TABLET, FILM COATED ORAL
Qty: 0 | Refills: 0 | DISCHARGE
Start: 2023-03-14

## 2023-03-14 RX ORDER — ACETAMINOPHEN 500 MG
2 TABLET ORAL
Qty: 0 | Refills: 0 | DISCHARGE
Start: 2023-03-14

## 2023-03-14 RX ORDER — INSULIN GLARGINE 100 [IU]/ML
15 INJECTION, SOLUTION SUBCUTANEOUS
Qty: 0 | Refills: 0 | DISCHARGE
Start: 2023-03-14

## 2023-03-14 RX ORDER — INSULIN GLARGINE 100 [IU]/ML
12 INJECTION, SOLUTION SUBCUTANEOUS
Qty: 0 | Refills: 0 | DISCHARGE
Start: 2023-03-14

## 2023-03-14 RX ORDER — FUROSEMIDE 40 MG
1 TABLET ORAL
Qty: 0 | Refills: 0 | DISCHARGE
Start: 2023-03-14

## 2023-03-14 RX ORDER — FUROSEMIDE 40 MG
40 TABLET ORAL
Refills: 0 | Status: DISCONTINUED | OUTPATIENT
Start: 2023-03-14 | End: 2023-03-14

## 2023-03-14 RX ORDER — METOPROLOL TARTRATE 50 MG
1 TABLET ORAL
Qty: 0 | Refills: 0 | DISCHARGE
Start: 2023-03-14

## 2023-03-14 RX ORDER — ASCORBIC ACID 60 MG
500 TABLET,CHEWABLE ORAL DAILY
Refills: 0 | Status: DISCONTINUED | OUTPATIENT
Start: 2023-03-14 | End: 2023-03-14

## 2023-03-14 RX ORDER — OXYCODONE HYDROCHLORIDE 5 MG/1
1 TABLET ORAL
Qty: 0 | Refills: 0 | DISCHARGE
Start: 2023-03-14

## 2023-03-14 RX ORDER — FOLIC ACID 0.8 MG
1 TABLET ORAL
Qty: 0 | Refills: 0 | DISCHARGE
Start: 2023-03-14

## 2023-03-14 RX ORDER — FOLIC ACID 0.8 MG
1 TABLET ORAL DAILY
Refills: 0 | Status: DISCONTINUED | OUTPATIENT
Start: 2023-03-14 | End: 2023-03-14

## 2023-03-14 RX ADMIN — Medication 81 MILLIGRAM(S): at 11:06

## 2023-03-14 RX ADMIN — Medication 500 MILLIGRAM(S): at 11:04

## 2023-03-14 RX ADMIN — DORZOLAMIDE HYDROCHLORIDE 1 DROP(S): 20 SOLUTION/ DROPS OPHTHALMIC at 10:40

## 2023-03-14 RX ADMIN — Medication 0: at 08:57

## 2023-03-14 RX ADMIN — Medication 20 MILLIGRAM(S): at 05:40

## 2023-03-14 RX ADMIN — SPIRONOLACTONE 25 MILLIGRAM(S): 25 TABLET, FILM COATED ORAL at 05:39

## 2023-03-14 RX ADMIN — Medication 5 UNIT(S): at 08:58

## 2023-03-14 RX ADMIN — PANTOPRAZOLE SODIUM 40 MILLIGRAM(S): 20 TABLET, DELAYED RELEASE ORAL at 05:39

## 2023-03-14 RX ADMIN — Medication 325 MILLIGRAM(S): at 11:05

## 2023-03-14 RX ADMIN — Medication 25 MILLIGRAM(S): at 05:39

## 2023-03-14 RX ADMIN — Medication 1 MILLIGRAM(S): at 11:06

## 2023-03-14 RX ADMIN — MONTELUKAST 10 MILLIGRAM(S): 4 TABLET, CHEWABLE ORAL at 11:05

## 2023-03-14 NOTE — PROGRESS NOTE ADULT - REASON FOR ADMISSION
CAD
Preop CABG
cardiac surgery
cardiac surgery
cad
cardiac surgery
C3LIMA
C3LIMA
CABG
sp    C3L
sp    CABG
s/p C3L
cardiac surgery

## 2023-03-14 NOTE — DISCHARGE NOTE NURSING/CASE MANAGEMENT/SOCIAL WORK - NSDCPEFALRISK_GEN_ALL_CORE
For information on Fall & Injury Prevention, visit: [email or web address] OR  [email or web address] OR  https://www.cdc.gov/steadi/patient.html

## 2023-03-14 NOTE — PROGRESS NOTE ADULT - ASSESSMENT
71 yo Man with PMHx of HTN, HLD, DMT2 (last A1C 6.2) presents for cardiac cath. pt reports he has been feeling intermittent SOB and dizziness, evaluated by Dr. Vieira and had abnormal cardiac CTA after having abnormal TTE and exercise stress test. He denies chest pain, palpitation or SOB currently.     Pt now s/p cardiac cath on 3/2/23 revealing triple vessel disease (mLAD 90%, distal Cx 80%, mRCA 80%). Ct surgery consulted for CABG eval with Dr. Torres.  likely plan for OR either Tuesday or Thursday.    3/3 VSS overnight. HR/BP stable on Lopressor 12.5mg BID. No CP/SOB.  Pending TTE today.  P2y12 187 this AM.  Repeat TSH 5 however remainder of Thyroid panel normal.  UA and MRSA PCR ordered   3/4 VSS no chest pain OR tuesday. Continue preop work up  3/5 VSS CP free OR Tuesday  3/6 Preop OR tomorrow  3/7 S/P C3L, extubated Intra-Op with intrathecal morphine given. RTOR for bleeding. 2u PRBC post op.  3/8 Transfer to SDU from CTU, + RIJ in place, + PW, + MED CTx1, + LP CT x1 , monitor drainage, + Casiano in place,   3/9 VSS D/c IJ d/c  insulin gtt d/c casiano diuresis with lasix 40 bid maintain chest tube med 360 left pleura 425 initiate  Lopressor 25  BID- Return to CTU   3/10 Moves back out to floor. VSS; LP chest tube in place. Off diuretics, BB on Hold.   3/11 L pl tube w/ 6 output Off betablocker syncope/low dejan state CXR Will start low dose diuretic  3/12  Increase betablocker Ambulate   LUIS this week  DC PW  3/13  lop to 25 q8      pervena off  neg 1800  3/14      vss  pedal edema  neg  300 cc

## 2023-03-14 NOTE — PROGRESS NOTE ADULT - SUBJECTIVE AND OBJECTIVE BOX
Chief complaint  Patient is a 72y old  Male who presents with a chief complaint of sp    CABG (14 Mar 2023 08:43)         Labs and Fingersticks  CAPILLARY BLOOD GLUCOSE      POCT Blood Glucose.: 105 mg/dL (14 Mar 2023 07:35)  POCT Blood Glucose.: 123 mg/dL (13 Mar 2023 22:24)  POCT Blood Glucose.: 108 mg/dL (13 Mar 2023 16:28)  POCT Blood Glucose.: 114 mg/dL (13 Mar 2023 11:13)      Anion Gap, Serum: 12 (03-14 @ 05:53)  Anion Gap, Serum: 12 (03-13 @ 05:26)      Calcium, Total Serum: 8.6 (03-14 @ 05:53)  Calcium, Total Serum: 8.4 (03-13 @ 05:26)  Albumin, Serum: 3.4 (03-13 @ 05:26)    Alanine Aminotransferase (ALT/SGPT): 39 (03-13 @ 05:26)  Alkaline Phosphatase, Serum: 90 (03-13 @ 05:26)  Aspartate Aminotransferase (AST/SGOT): 37 (03-13 @ 05:26)        03-14    136  |  102  |  18  ----------------------------<  103<H>  3.9   |  22  |  0.90    Ca    8.6      14 Mar 2023 05:53  Phos  3.4     03-13  Mg     2.2     03-13    TPro  6.2  /  Alb  3.4  /  TBili  0.6  /  DBili  x   /  AST  37  /  ALT  39  /  AlkPhos  90  03-13                        8.4    8.88  )-----------( 281      ( 14 Mar 2023 05:53 )             26.0     Medications  MEDICATIONS  (STANDING):  ascorbic acid 500 milliGRAM(s) Oral daily  aspirin enteric coated 81 milliGRAM(s) Oral daily  atorvastatin 80 milliGRAM(s) Oral at bedtime  bisacodyl Suppository 10 milliGRAM(s) Rectal once  dorzolamide 2% Ophthalmic Solution 1 Drop(s) Both EYES <User Schedule>  enoxaparin Injectable 40 milliGRAM(s) SubCutaneous every 24 hours  ferrous    sulfate 325 milliGRAM(s) Oral daily  folic acid 1 milliGRAM(s) Oral daily  furosemide    Tablet 40 milliGRAM(s) Oral two times a day  insulin glargine Injectable (LANTUS) 15 Unit(s) SubCutaneous at bedtime  insulin lispro (ADMELOG) corrective regimen sliding scale   SubCutaneous three times a day before meals  insulin lispro (ADMELOG) corrective regimen sliding scale   SubCutaneous at bedtime  insulin lispro Injectable (ADMELOG) 5 Unit(s) SubCutaneous three times a day before meals  metoprolol tartrate 25 milliGRAM(s) Oral every 8 hours  montelukast 10 milliGRAM(s) Oral daily  pantoprazole    Tablet 40 milliGRAM(s) Oral before breakfast  polyethylene glycol 3350 17 Gram(s) Oral daily  senna 2 Tablet(s) Oral at bedtime  sodium chloride 0.9%. 1000 milliLiter(s) (10 mL/Hr) IV Continuous <Continuous>  spironolactone 25 milliGRAM(s) Oral daily  tamsulosin 0.4 milliGRAM(s) Oral at bedtime      Physical Exam  General: Patient comfortable in bed , oob and walking   Vital Signs Last 12 Hrs  T(F): 99 (03-14-23 @ 04:45), Max: 99 (03-14-23 @ 04:45)  HR: 84 (03-14-23 @ 04:45) (84 - 84)  BP: 111/52 (03-14-23 @ 04:45) (111/52 - 111/52)  BP(mean): --  RR: 18 (03-14-23 @ 04:45) (18 - 18)  SpO2: 93% (03-14-23 @ 04:45) (93% - 93%)    CVS: S1S2   Respiratory: No wheezing, no crepitations  GI: Abdomen soft, bowel sounds positive  Musculoskeletal:  moves all extremities  : Voiding          Chief complaint  Patient is a 72y old  Male who presents with a chief complaint of sp    CABG (14 Mar 2023 08:43)       Labs and Fingersticks  CAPILLARY BLOOD GLUCOSE      POCT Blood Glucose.: 105 mg/dL (14 Mar 2023 07:35)  POCT Blood Glucose.: 123 mg/dL (13 Mar 2023 22:24)  POCT Blood Glucose.: 108 mg/dL (13 Mar 2023 16:28)  POCT Blood Glucose.: 114 mg/dL (13 Mar 2023 11:13)      Anion Gap, Serum: 12 (03-14 @ 05:53)  Anion Gap, Serum: 12 (03-13 @ 05:26)      Calcium, Total Serum: 8.6 (03-14 @ 05:53)  Calcium, Total Serum: 8.4 (03-13 @ 05:26)  Albumin, Serum: 3.4 (03-13 @ 05:26)    Alanine Aminotransferase (ALT/SGPT): 39 (03-13 @ 05:26)  Alkaline Phosphatase, Serum: 90 (03-13 @ 05:26)  Aspartate Aminotransferase (AST/SGOT): 37 (03-13 @ 05:26)        03-14    136  |  102  |  18  ----------------------------<  103<H>  3.9   |  22  |  0.90    Ca    8.6      14 Mar 2023 05:53  Phos  3.4     03-13  Mg     2.2     03-13    TPro  6.2  /  Alb  3.4  /  TBili  0.6  /  DBili  x   /  AST  37  /  ALT  39  /  AlkPhos  90  03-13                        8.4    8.88  )-----------( 281      ( 14 Mar 2023 05:53 )             26.0     Medications  MEDICATIONS  (STANDING):  ascorbic acid 500 milliGRAM(s) Oral daily  aspirin enteric coated 81 milliGRAM(s) Oral daily  atorvastatin 80 milliGRAM(s) Oral at bedtime  bisacodyl Suppository 10 milliGRAM(s) Rectal once  dorzolamide 2% Ophthalmic Solution 1 Drop(s) Both EYES <User Schedule>  enoxaparin Injectable 40 milliGRAM(s) SubCutaneous every 24 hours  ferrous    sulfate 325 milliGRAM(s) Oral daily  folic acid 1 milliGRAM(s) Oral daily  furosemide    Tablet 40 milliGRAM(s) Oral two times a day  insulin glargine Injectable (LANTUS) 15 Unit(s) SubCutaneous at bedtime  insulin lispro (ADMELOG) corrective regimen sliding scale   SubCutaneous three times a day before meals  insulin lispro (ADMELOG) corrective regimen sliding scale   SubCutaneous at bedtime  insulin lispro Injectable (ADMELOG) 5 Unit(s) SubCutaneous three times a day before meals  metoprolol tartrate 25 milliGRAM(s) Oral every 8 hours  montelukast 10 milliGRAM(s) Oral daily  pantoprazole    Tablet 40 milliGRAM(s) Oral before breakfast  polyethylene glycol 3350 17 Gram(s) Oral daily  senna 2 Tablet(s) Oral at bedtime  sodium chloride 0.9%. 1000 milliLiter(s) (10 mL/Hr) IV Continuous <Continuous>  spironolactone 25 milliGRAM(s) Oral daily  tamsulosin 0.4 milliGRAM(s) Oral at bedtime      Physical Exam  General: Patient comfortable in bed , oob and walking   Vital Signs Last 12 Hrs  T(F): 99 (03-14-23 @ 04:45), Max: 99 (03-14-23 @ 04:45)  HR: 84 (03-14-23 @ 04:45) (84 - 84)  BP: 111/52 (03-14-23 @ 04:45) (111/52 - 111/52)  BP(mean): --  RR: 18 (03-14-23 @ 04:45) (18 - 18)  SpO2: 93% (03-14-23 @ 04:45) (93% - 93%)    CVS: S1S2   Respiratory: No wheezing, no crepitations  GI: Abdomen soft, bowel sounds positive  Musculoskeletal:  moves all extremities  : Voiding

## 2023-03-14 NOTE — PROGRESS NOTE ADULT - NS ATTEND AMEND GEN_ALL_CORE FT
Chart, labs, vitals, radiology reviewed. Above H&P reviewed and edited where appropriate. Agree with history and physical exam. Agree with assessment and plan. I reviewed the overnight course of events and discussed the care with the patient/ family.  All the decisions in assessment and plan are solely made by me..

## 2023-03-14 NOTE — PROGRESS NOTE ADULT - ASSESSMENT
Assessment  DMT2: 72y Male with DM T2 with hyperglycemia, A1C 6.1% , was on oral DM meds at home, s/p CABG, now off insulin drip transitioned to  basal and bolus, blood sugars are stable and within target goal , he is eating  and ambulating with assistance. DC planning.   CAD: on medications, stable, monitored. s/p CABG  HTN: on antihypertensive medications, monitored asymptomatic.  Obesity: No strict exercise routines, not on any weight loss program, neither on low calorie diet.        Discussed plan and management with Dr Ro.   LLOYD Mcdaniel MD  Cell: [phone]  Office: [phone]               Assessment  DMT2: 72y Male with DM T2 with hyperglycemia, A1C 6.1% , was on oral DM meds at home, s/p CABG, now off insulin drip transitioned to  basal and bolus, blood sugars are stable and within target goal , he is eating and ambulating with assistance. DC planning.   CAD: on medications, stable, monitored. s/p CABG  HTN: on antihypertensive medications, monitored asymptomatic.  Obesity: No strict exercise routines, not on any weight loss program, neither on low calorie diet.        Discussed plan and management with Dr Ro.   LLOYD Mcdaniel MD  Cell: [phone]  Office: [phone]

## 2023-03-14 NOTE — PROGRESS NOTE ADULT - SUBJECTIVE AND OBJECTIVE BOX
VITAL SIGNS    Telemetry:  sr 80    Vital Signs Last 24 Hrs  T(C): 37.2 (23 @ 04:45), Max: 37.2 (23 @ 04:45)  T(F): 99 (23 @ 04:45), Max: 99 (23 @ 04:45)  HR: 84 (23 @ 04:45) (84 - 99)  BP: 111/52 (23 @ 04:45) (111/52 - 155/69)  RR: 18 (23 @ 04:45) (18 - 18)  SpO2: 93% (23 @ 04:45) (90% - 97%)                   Daily     Daily Weight in k (14 Mar 2023 07:37)        CAPILLARY BLOOD GLUCOSE      POCT Blood Glucose.: 105 mg/dL (14 Mar 2023 07:35)  POCT Blood Glucose.: 123 mg/dL (13 Mar 2023 22:24)  POCT Blood Glucose.: 108 mg/dL (13 Mar 2023 16:28)  POCT Blood Glucose.: 114 mg/dL (13 Mar 2023 11:13                       PHYSICAL EXAM    Neurology: alert and oriented x 3, moves all extremities with no defecits  CV :  RRR  Sternal Wound :  CDI , Stable  Lungs:   CTA B/L  Abdomen: soft, nontender, nondistended, positive bowel sounds  Extremities:     plus one pedal edema

## 2023-03-14 NOTE — PROGRESS NOTE ADULT - PROBLEM SELECTOR PLAN 3
Home ACE-i held    Continue Lopressor 12.5mg BID
Suggest to continue medications, monitoring, FU primary team recommendations.
Home ACE-i held    Continue Lopressor 12.5mg BID
Suggest to continue medications, monitoring, FU primary team recommendations.
Home ACE-i held    Continue Lopressor 12.5mg BID
Suggest to continue medications, monitoring, FU primary team recommendations.

## 2023-03-14 NOTE — PROGRESS NOTE ADULT - PROBLEM SELECTOR PROBLEM 2
Triple vessel coronary artery disease
DM (diabetes mellitus)
Triple vessel coronary artery disease
DM (diabetes mellitus)
Triple vessel coronary artery disease
DM (diabetes mellitus)
Triple vessel coronary artery disease
Triple vessel coronary artery disease
DM (diabetes mellitus)

## 2023-03-14 NOTE — PROGRESS NOTE ADULT - PROBLEM SELECTOR PROBLEM 1
DM (diabetes mellitus)
S/P CABG x 3
S/P CABG x 3
DM (diabetes mellitus)
S/P CABG x 3
Triple vessel coronary artery disease
Triple vessel coronary artery disease
S/P CABG x 3
DM (diabetes mellitus)
Triple vessel coronary artery disease

## 2023-03-14 NOTE — DISCHARGE NOTE NURSING/CASE MANAGEMENT/SOCIAL WORK - PATIENT PORTAL LINK FT
You can access the FollowMyHealth Patient Portal offered by [facility] by registering at the following website: [email or web address]. By joining [facility]’s FollowMyHealth portal, you will also be able to view your health information using other applications (apps) compatible with our system.

## 2023-03-14 NOTE — PROGRESS NOTE ADULT - PROBLEM SELECTOR PLAN 1
c/w ASA 81QD, Ator 81mg  Lopressor  25   q8  Diuretics leg cristina     rehab  today if bed available

## 2023-03-14 NOTE — DISCHARGE NOTE NURSING/CASE MANAGEMENT/SOCIAL WORK - NSDCVIVACCINE_GEN_ALL_CORE_FT
influenza, injectable, quadrivalent, preservative free; 19-Nov-2018 09:50; Mari Sheets (LEELA); [employer]; [id number] (Exp. Date: 30-Jun-2019); IntraMuscular; Deltoid Right.; 0.5 milliLiter(s); VIS (VIS Published: 07-Aug-2015, VIS Presented: 19-Nov-2018);

## 2023-03-14 NOTE — PROGRESS NOTE ADULT - PROVIDER SPECIALTY LIST ADULT
CT Surgery
CT Surgery
Critical Care
CT Surgery
Endocrinology
CT Surgery
Endocrinology
CT Surgery
CT Surgery
Endocrinology
CT Surgery

## 2023-03-17 ENCOUNTER — NON-APPOINTMENT (OUTPATIENT)
Age: 73
End: 2023-03-17

## 2023-03-20 ENCOUNTER — APPOINTMENT (OUTPATIENT)
Dept: CARE COORDINATION | Facility: HOME HEALTH | Age: 73
End: 2023-03-20
Payer: MEDICARE

## 2023-03-20 ENCOUNTER — TRANSCRIPTION ENCOUNTER (OUTPATIENT)
Age: 73
End: 2023-03-20

## 2023-03-20 VITALS — WEIGHT: 195 LBS | RESPIRATION RATE: 12 BRPM | BODY MASS INDEX: 27.98 KG/M2

## 2023-03-20 PROCEDURE — 99024 POSTOP FOLLOW-UP VISIT: CPT

## 2023-03-20 RX ORDER — CILOSTAZOL 100 MG/1
100 TABLET ORAL
Refills: 0 | Status: DISCONTINUED | COMMUNITY
Start: 2023-02-28 | End: 2023-03-20

## 2023-03-20 RX ORDER — SULFAMETHOXAZOLE AND TRIMETHOPRIM 800; 160 MG/1; MG/1
800-160 TABLET ORAL TWICE DAILY
Qty: 28 | Refills: 0 | Status: DISCONTINUED | COMMUNITY
Start: 2023-02-07 | End: 2023-03-20

## 2023-03-20 NOTE — PHYSICAL EXAM
[Sclera] : the sclera and conjunctiva were normal [Neck Appearance] : the appearance of the neck was normal [] : no respiratory distress [Respiration, Rhythm And Depth] : normal respiratory rhythm and effort [Exaggerated Use Of Accessory Muscles For Inspiration] : no accessory muscle use [Auscultation Breath Sounds / Voice Sounds] : lungs were clear to auscultation bilaterally [Apical Impulse] : the apical impulse was normal [Heart Rate And Rhythm] : heart rate was normal and rhythm regular [Heart Sounds] : normal S1 and S2 [Heart Sounds Gallop] : no gallops [Murmurs] : no murmurs [Heart Sounds Pericardial Friction Rub] : no pericardial rub [Examination Of The Chest] : the chest was normal in appearance [Chest Visual Inspection Thoracic Asymmetry] : no chest asymmetry [Diminished Respiratory Excursion] : normal chest expansion [Bowel Sounds] : normal bowel sounds [Abdomen Soft] : soft [Abdomen Tenderness] : non-tender [Abnormal Walk] : normal gait [Skin Color & Pigmentation] : normal skin color and pigmentation [Sensation] : the sensory exam was normal to light touch and pinprick [Motor Exam] : the motor exam was normal [No Focal Deficits] : no focal deficits [Oriented To Time, Place, And Person] : oriented to person, place, and time [Impaired Insight] : insight and judgment were intact [Affect] : the affect was normal [Mood] : the mood was normal [FreeTextEntry1] : MSI, CT sites and right leg SVG sites without erythema or drainage with edges well approximated.

## 2023-03-20 NOTE — HISTORY OF PRESENT ILLNESS
[Home] : at home, [unfilled] , at the time of the visit. [Other Location: e.g. Home (Enter Location, City,State)___] : at [unfilled] [Verbal consent obtained from patient] : the patient, [unfilled] [FreeTextEntry1] : 73 yo Man with PMHx of HTN, HLD, DMT2 (last A1C 6.2) presents for cardiac cath. pt reports he has been feeling intermittent SOB and dizziness, evaluated by Dr. Vieira and had abnormal cardiac CTA after having abnormal TTE and exercise stress test. \par 3/7 S/P C3L, extubated Intra-Op with intrathecal morphine given. RTOR for bleeding. 2u PRBC post op.\par 3/8 Transfer to SDU from CTU, + RIJ in place, + PW, + MED CTx1, + LP CT x1 ,\par monitor drainage, + Casiano in place,\par 3/9 VSS D/c IJ d/c  insulin gtt d/c casiano diuresis with lasix 40 bid maintain\par chest tube med 360 left pleura 425 initiate  Lopressor 25  BID- Return to CTU\par 3/10 Moves back out to floor. VSS; LP chest tube in place. Off diuretics, BB on Hold.\par 3/11 L pl tube w/ 6 output Off betablocker syncope/low dejan state CXR Will start\par low dose diuretic\par 3/12  Increase betablocker Ambulate   LUIS this week  DC PW\par 3/13  lop to 25 q8      pervena off  neg 1800\par 3/14- DC to [facility]\par 3/17- DC home\par 3/20- Seen by [facility] NP for a f/u teleHealth visit. Emotional support and education provided. All questions answered. Pt overall is recovering well w/o complaints.\par \par

## 2023-03-20 NOTE — ASSESSMENT
[FreeTextEntry1] : Pt recovering well at home s/p cardiac surgery. Reviewed all medications and dosages with pt understanding. Pt has all medications in home and is taking as prescribed. Pt denies pain. Pt is aware of F/U appts scheduled and that need to be scheduled as listed below.  \par

## 2023-03-20 NOTE — REASON FOR VISIT
[Post Hospitalization] : a post hospitalization visit [FreeTextEntry1] : FOLLOW YOUR HEART - Transitional Care Management Program - [facility]

## 2023-03-21 ENCOUNTER — TRANSCRIPTION ENCOUNTER (OUTPATIENT)
Age: 73
End: 2023-03-21

## 2023-03-23 ENCOUNTER — APPOINTMENT (OUTPATIENT)
Dept: INTERNAL MEDICINE | Facility: CLINIC | Age: 73
End: 2023-03-23
Payer: MEDICARE

## 2023-03-23 VITALS
DIASTOLIC BLOOD PRESSURE: 64 MMHG | SYSTOLIC BLOOD PRESSURE: 110 MMHG | HEIGHT: 70 IN | WEIGHT: 195 LBS | TEMPERATURE: 97.3 F | RESPIRATION RATE: 15 BRPM | BODY MASS INDEX: 27.92 KG/M2 | OXYGEN SATURATION: 99 % | HEART RATE: 78 BPM

## 2023-03-23 DIAGNOSIS — Z09 ENCOUNTER FOR FOLLOW-UP EXAMINATION AFTER COMPLETED TREATMENT FOR CONDITIONS OTHER THAN MALIGNANT NEOPLASM: ICD-10-CM

## 2023-03-23 DIAGNOSIS — D64.9 ANEMIA, UNSPECIFIED: ICD-10-CM

## 2023-03-23 PROCEDURE — 99495 TRANSJ CARE MGMT MOD F2F 14D: CPT

## 2023-03-23 NOTE — PHYSICAL EXAM
[No Acute Distress] : no acute distress [Well Nourished] : well nourished [Well Developed] : well developed [Well-Appearing] : well-appearing [Normal Sclera/Conjunctiva] : normal sclera/conjunctiva [PERRL] : pupils equal round and reactive to light [EOMI] : extraocular movements intact [Normal Outer Ear/Nose] : the outer ears and nose were normal in appearance [Normal Oropharynx] : the oropharynx was normal [No JVD] : no jugular venous distention [No Lymphadenopathy] : no lymphadenopathy [Supple] : supple [Thyroid Normal, No Nodules] : the thyroid was normal and there were no nodules present [No Respiratory Distress] : no respiratory distress  [No Accessory Muscle Use] : no accessory muscle use [Clear to Auscultation] : lungs were clear to auscultation bilaterally [Normal Rate] : normal rate  [Regular Rhythm] : with a regular rhythm [Normal S1, S2] : normal S1 and S2 [No Murmur] : no murmur heard [No Carotid Bruits] : no carotid bruits [No Abdominal Bruit] : a ~M bruit was not heard ~T in the abdomen [No Varicosities] : no varicosities [Pedal Pulses Present] : the pedal pulses are present [No Edema] : there was no peripheral edema [No Palpable Aorta] : no palpable aorta [No Extremity Clubbing/Cyanosis] : no extremity clubbing/cyanosis [Soft] : abdomen soft [Non Tender] : non-tender [Non-distended] : non-distended [No Masses] : no abdominal mass palpated [No HSM] : no HSM [Normal Bowel Sounds] : normal bowel sounds [Normal Posterior Cervical Nodes] : no posterior cervical lymphadenopathy [No CVA Tenderness] : no CVA  tenderness [Normal Anterior Cervical Nodes] : no anterior cervical lymphadenopathy [No Spinal Tenderness] : no spinal tenderness [No Joint Swelling] : no joint swelling [Grossly Normal Strength/Tone] : grossly normal strength/tone [No Rash] : no rash [Coordination Grossly Intact] : coordination grossly intact [No Focal Deficits] : no focal deficits [Normal Gait] : normal gait [Deep Tendon Reflexes (DTR)] : deep tendon reflexes were 2+ and symmetric [Normal Affect] : the affect was normal [Normal Insight/Judgement] : insight and judgment were intact [de-identified] : sternotomy healing

## 2023-03-23 NOTE — HISTORY OF PRESENT ILLNESS
[Discharged on ___] : discharged on [unfilled] [Discharge Summary] : discharge summary [Pertinent Labs] : pertinent labs [Discharge Med List] : discharge medication list [Other: ____] : [unfilled] [Med Reconciliation] : medication reconciliation has been completed [FreeTextEntry2] : s/p CABG postop re-exploration, 4 units PRBU ABLA\par [facility] to [facility]\par \par Hospital course as per dc note:\par \par 3/5 VSS CP free OR Tuesday \par 3/6 Preop OR tomorrow \par 3/7 S/P C3L, extubated Intra-Op with intrathecal morphine given. RTOR for \par bleeding. 2u PRBC post op. \par 3/8 Transfer to SDU from CTU, + RIJ in place, + PW, + MED CTx1, + LP CT x1 , \par monitor drainage, + Casiano in place, \par 3/9 VSS D/c IJ d/c  insulin gtt d/c casiano diuresis with lasix 40 bid maintain \par chest tube med 360 left pleura 425 initiate  Lopressor 25  BID- Return to CTU \par 3/10 Moves back out to floor. VSS; LP chest tube in place. Off diuretics, BB on \par Hold. \par 3/11 L pl tube w/ 6 output Off betablocker syncope/low dejan state CXR Will start \par low dose diuretic \par 3/12  Increase betablocker Ambulate LUIS this week  DC PW \par 3/13  lop to 25 q8  pervena off  neg 1800 \par \par

## 2023-03-28 ENCOUNTER — TRANSCRIPTION ENCOUNTER (OUTPATIENT)
Age: 73
End: 2023-03-28

## 2023-03-28 ENCOUNTER — APPOINTMENT (OUTPATIENT)
Dept: CARDIOTHORACIC SURGERY | Facility: CLINIC | Age: 73
End: 2023-03-28
Payer: MEDICARE

## 2023-03-28 VITALS — HEIGHT: 70 IN | WEIGHT: 188 LBS | BODY MASS INDEX: 26.92 KG/M2

## 2023-03-28 VITALS
SYSTOLIC BLOOD PRESSURE: 121 MMHG | RESPIRATION RATE: 15 BRPM | TEMPERATURE: 98 F | DIASTOLIC BLOOD PRESSURE: 82 MMHG | OXYGEN SATURATION: 98 % | HEART RATE: 76 BPM

## 2023-03-28 PROCEDURE — 99024 POSTOP FOLLOW-UP VISIT: CPT

## 2023-03-28 RX ORDER — POTASSIUM CHLORIDE 750 MG/1
10 TABLET, FILM COATED, EXTENDED RELEASE ORAL DAILY
Qty: 30 | Refills: 0 | Status: COMPLETED | COMMUNITY
Start: 2023-03-20 | End: 2023-03-28

## 2023-03-28 RX ORDER — FUROSEMIDE 40 MG/1
40 TABLET ORAL TWICE DAILY
Refills: 0 | Status: COMPLETED | COMMUNITY
Start: 2023-03-20 | End: 2023-03-28

## 2023-03-28 RX ORDER — SPIRONOLACTONE 25 MG/1
25 TABLET ORAL DAILY
Qty: 5 | Refills: 0 | Status: COMPLETED | COMMUNITY
Start: 2023-03-20 | End: 2023-03-28

## 2023-03-28 NOTE — ASSESSMENT
[FreeTextEntry1] : 71 yo Man with PMHx of HTN, HLD, DMT2 (last A1C 6.2)followed and referred by Dr. Vieira sp CABG X 3 LIMA\par Intra-Op with intrathecal morphine given. RTOR for bleeding. 2u PRBC post op, Return to CTU, start BB and diuretics, DC to [facility]. He reports being "down in the dumps." His wife endorses patient is fatigued. \par \par Today on exam bilateral lung fields are clear, no wheezing or rales, no use of accessory muscles noted or respiratory distress, normal sinus rhythm, sternum stable, incision clean, dry and intact.  \par \par RIGHT SVG site is clean, dry and intact. Retained suture was removed today.  \par \par No peripheral edema noted. \par \par Instructed patient on importance of optimal glycemic control, daily showering, daily weights and no heavy lifting. Instructed to call office with any signs of fever (temperature greater than 101F, chills, increasing shortness of breath, palpitations or lightheadedness or syncope. Also reinforced with patient to call office if any weight gain of 2 or more pounds in 1 day or 3 or more pounds in 1 week. \par \par Discussed intake of plant based foods, including vegetables, fruits, and whole grain foods: legumes, nuts and seeds, fish or seafood, lean meats, and non-fat or low-fat diary foods. Plant based oils (non-tropical) in place of solid fats. Instructed patient to limit intake of high fat meats and processed meats, high-fat diary foods, dietary cholesterol and sodium, foods and beverages with added sugars. \par \par Plan:\par 1) STOP LASIX, ALDACTONE AND POTASSIUM TODAY\par 2) Follow up with cardiologist- Dr. Vieira\par 3) Increase activity as tolerated\par 4) Seek medical attention if you experience chest pain, palpitations, shortness of breath, persistent nausea and vomiting\par oozing from wounds or pain not relieved with medication\par 5) Notify surgeon for any temperature greater than 101 F\par 6) Keep legs elevated when sitting\par 7) Virtual cardiac rehab\par 8) May return on as needed basis \par 9) SBE antibiotic prophylaxis discussed \par 10) Wait till 3 weeks till driving \par \par \par

## 2023-03-28 NOTE — END OF VISIT
[FreeTextEntry3] : I, Dr. Miguel A Torres, personally performed the evaluation and management (E/M) services for this new patient.  That E/M includes conducting the initial examination, assessing all conditions, and establishing the plan of care.  Today, Maria England NP was here to observe my evaluation and management services for this patient to be followed going forward.\par

## 2023-03-28 NOTE — PHYSICAL EXAM
[Respiration, Rhythm And Depth] : normal respiratory rhythm and effort [Auscultation Breath Sounds / Voice Sounds] : lungs were clear to auscultation bilaterally [Heart Rate And Rhythm] : heart rate was normal and rhythm regular [Clean] : clean [Dry] : dry [Healing Well] : healing well [No Edema] : no edema [Bleeding] : no active bleeding [Foul Odor] : no foul smell [FreeTextEntry5] : RIGHT SVG SITE IS CLEAN AND DRY

## 2023-03-28 NOTE — CONSULT LETTER
[Dear  ___] : Dear  [unfilled], [Courtesy Letter:] : I had the pleasure of seeing your patient, [unfilled], in my office today. [Please see my note below.] : Please see my note below. [Sincerely,] : Sincerely, [FreeTextEntry2] : Dr. Vieira [FreeTextEntry3] : Miguel A Torres MD\par Attending Surgeon\par Cardiovascular & Thoracic Surgery

## 2023-03-31 ENCOUNTER — NON-APPOINTMENT (OUTPATIENT)
Age: 73
End: 2023-03-31

## 2023-03-31 ENCOUNTER — APPOINTMENT (OUTPATIENT)
Dept: CARDIOLOGY | Facility: CLINIC | Age: 73
End: 2023-03-31
Payer: MEDICARE

## 2023-03-31 VITALS
DIASTOLIC BLOOD PRESSURE: 71 MMHG | BODY MASS INDEX: 26.92 KG/M2 | OXYGEN SATURATION: 97 % | RESPIRATION RATE: 19 BRPM | TEMPERATURE: 95.2 F | WEIGHT: 188 LBS | SYSTOLIC BLOOD PRESSURE: 133 MMHG | HEART RATE: 77 BPM | HEIGHT: 70 IN

## 2023-03-31 PROCEDURE — 93000 ELECTROCARDIOGRAM COMPLETE: CPT

## 2023-03-31 PROCEDURE — 99214 OFFICE O/P EST MOD 30 MIN: CPT

## 2023-03-31 NOTE — REASON FOR VISIT
[CV Risk Factors and Non-Cardiac Disease] : CV risk factors and non-cardiac disease [Coronary Artery Disease] : coronary artery disease [FreeTextEntry3] : Dr. Reyes [FreeTextEntry1] : Jt will be undergoing an MRI of the prostate . He has had some shortness of breath and hypertension. He  is followed by Dr. Henry who has treated him with Pletal cilostazol for peripheral vascular disease. An MRI is planned next week. He worked as a [employer] in [employer] for 45 years. He  comes today for clarification of  his  overall cardiovascular risk. He was advised to undergo a complete cardiac evaluation. He denies current chest pains shortness of breath or loss of consciousnes.\par \par 3/31/23\par Jt underwent coronary bypass he developed a hemothorax we recorded an evacuation he later had a pulseless electrical activity which required resuscitation and is now doing well postoperatively we discussed secondary risk reduction with his friend at the bedside.  He will undergo urologic evaluations and subspecialty evaluations as indicated.\par \par

## 2023-03-31 NOTE — CARDIOLOGY SUMMARY
[de-identified] : 1/18/23 sinus rhythm rightward axis [de-identified] : 1/25/18 stage 3, 8 minutes [de-identified] : 11/17/18 moderately enlarge right ventricle, mild Pulmonic valve regurgitation ejection fraction 55 to 60%\par 3/9/2023 normal left ventricular function postoperative septal motion [de-identified] : 3/7/2023 open heart surgery Dr. Torres

## 2023-03-31 NOTE — DISCUSSION/SUMMARY
[EKG obtained to assist in diagnosis and management of assessed problem(s)] : EKG obtained to assist in diagnosis and management of assessed problem(s) [FreeTextEntry1] : I have asked  Jt  to undergo detailed cardiac testing in order to evaluate her overall cardiovascular risk. An assessment of both structural and functional heart disease was recommended to the patient.\par In this regard, an echocardiogram and a stress test were advised to the patient. I await the upcoming noninvasive cardiac testing in order to assess her overall cardiovascular risk. We discussed the pros and cons of plain treadmill stress testing nuclear stress testing and angiography including a sensitivity analysis.We discussed current ACC guidelines and the calculated 10 year risk is approximately   48% which is severely elevated. More than half of the face to face encounter of 60 minutes   was spent in counseling the patient with respect to  cardiovascular risk. we suspect possible chronic pulmonary disorder given prior right ventricular enlargement on 2018\par Quality measures \par Tobacco intervention not indicated\par Statin for prevention of cardiovascular disease indicated\par Hypertension compensated\par Aspirin for ischemic vascular disease not indicated\par Tobacco screening cessation and intervention not indicated\par \par Medical necessity\par This is a high encounter based upon two or more chronic illnesses with mild exacerbation requiring further management and evaluation.  After careful review of the chart and recommendations after recent pulseless electrical activity.\par \par EKG is indicated for evaluation of shortness of breath\par \par Update 3/31/2023\par A long discussion with Jt with respect to recent open heart surgery a pulmonary evaluation with Dr. Weiss and is suggested given axis deviation and physical findings of rhonchi.  We will continue to withhold spironolactone and furosemide given recent pulseless electrical activity which may have promoted dehydration\par \par this patient has a high risk for major adverese cardiac events. based upon [place] risk however now compensated after recent open heart surgery\par \par \par risks benefits alternatives were discussed with the patient and his significant other.\par all questions were answered to their satisfaction.\par

## 2023-04-03 ENCOUNTER — TRANSCRIPTION ENCOUNTER (OUTPATIENT)
Age: 73
End: 2023-04-03

## 2023-04-04 ENCOUNTER — APPOINTMENT (OUTPATIENT)
Dept: CARDIOLOGY | Facility: CLINIC | Age: 73
End: 2023-04-04

## 2023-04-04 ENCOUNTER — NON-APPOINTMENT (OUTPATIENT)
Age: 73
End: 2023-04-04

## 2023-04-04 NOTE — REVIEW OF SYSTEMS
[Recent Change In Weight] : ~T recent weight change [Shortness Of Breath] : shortness of breath [Negative] : Psychiatric [FreeTextEntry2] : intentional weight loss of approximately 19 pounds endorsed [FreeTextEntry6] : mild shortness of breath with activity [de-identified] : states incisions healing well, no erythema, dry /intact [de-identified] : occasional lightheadedness with changing position

## 2023-04-04 NOTE — HISTORY OF PRESENT ILLNESS
[Does patient monitor blood pressure at home?] : patient monitors blood pressure at home [Low sodium diet] : low sodium diet [Other diet strategies] : other diet strategies [Weight management] : weight management [Patient will have a blood pressure < 130/80 mmHg] : patient will have a blood pressure < 130/80 mmHg [Diet changes including (healthy heart eating, less processed foods, low sodium diet)] : Diet changes including (healthy heart eating, less processed foods, low sodium diet) [Healthy weight (management maintenance)] : Healthy weight (management maintenance) [Define hypertension] : define hypertension [Exercise and blood pressure] : exercise and blood pressure [Processed food] : processed food [Yes, continue with Hypertension plan] : Yes, continue with Hypertension plan [Height: ___] : Height: [unfilled] [None] : none [CABG] : CABG [Age] : age [HLD] : HLD [Hypertension] : hypertension [Sedentary] : sedentary [Overweight/Obesity] : overweight/obesity [Angina with exercise] : no angina with exercise [Restrict upper body until ___] : restrict upper body until [unfilled] [Home-based] : Home-based [___ Days per week] : [unfilled] days per week [>= 31 minutes per session] : >= 31 minutes per session [Target RPE: ___] : Target RPE: [unfilled] [Treadmill] : treadmill [Recumbent bike] : recumbent bike [Upright exercise bike] : upright exercise bike [BioStep] : BioStep [Elliptical] : elliptical [Upper body ergometer] : upper body ergometer [Stair Climber] : stair climber [Assess in ___ weeks] : assess in [unfilled] weeks [Stretching/ROM exercises and balance exercises daily] : Stretching/ROM exercises and balance exercises daily [Will assess for musculoskeletal and other restrictions] : will assess for musculoskeletal and other restrictions [To start resistance training] : to start resistance training [To increase my time spent in physical activity and/or aerobic exercise] : to increase my time spent in physical activity and/or aerobic exercise [Individualized Exercise Rx] : individualized exercise Rx [Teach back utilized] : teach back utilized [Welcome packet provided] : welcome packet provided [Yes, per exercise prescription, policy] : Yes, per exercise prescription, policy [FreeTextEntry1] : CT Surgeon: Dr. Torres [FreeTextEntry5] : Dr. Reyes [de-identified] : Intake: Patient will be participating in virtual cardiac rehab. Cardiac Rehabilitation Phase 2 - Virtual sessions\par Session #1 and #36: in person\par \par  [Has the patient given CR team permission to speak with the emergency  about the patient?] : Has the patient given CR team permission to speak with the emergency  about the patient? Yes [Does patient have advance directive?] : Does patient have advance directive? Yes [Does patient see mental health provider?] : Does patient see mental health provider? No [Self-reports of improved psychosocial well-being] : Self-reports of improved psychosocial well-being [Discuss overview of emotional health supportive modalities] : Discuss overview of emotional health supportive modalities [Yes, continue with psychosocial plan] : Yes, continue with psychosocial plan [FreeTextEntry6] : none [FreeTextEntry9] : INTAKE: Patient endorses improved emotional well-being [Action] : Stage of change: Action [Overweight] : Overweight [] : yes [Sugar] : sugar [Sodium] : sodium [Trans fats/saturated fats] : trans fats/saturated fats [Is patient on medication for hyperlipidemia?] : Is patient on medication for hyperlipidemia? Yes [Atorvastatin] : atorvastatin [Is Patient adherent with medication?] : patient is adherent with medication [Patient has seen registered dietitian in the past?] : Patient has seen registered dietitian in the past? No [Patient is interested in seeing a registered dietitian?] : Patient is interested in seeing a registered dietitian? Yes [Self-reports of improved health eating patterns] : Self-reports of improved health eating patterns [Discuss an overview of healthy eating plan] : Discuss an overview of healthy eating plan [Mediterranean Diet] : Mediterranean diet [MyPlate.gov] : MyPlate.gov [Yes, continue with nutrition plan] : Yes, continue with nutrition plan [In progress] : in progress [FreeTextEntry8] : INTAKE: Patient has appointment with RD this month.

## 2023-04-04 NOTE — REASON FOR VISIT
[Assessment] : an assessment [Home] : at home, [unfilled] , at the time of the visit. [Medical Office: ([facility])___] : at the medical office located in  [Verbal consent obtained from patient] : the patient, [unfilled] [Intake Visit] : an intake visit [FreeTextEntry1] : ITP to be reviewed and manually signed by Dr. Amaya; ITP to be finalized at session #1; Clinical indication for cardiac rehabilitation: PCI

## 2023-04-04 NOTE — HISTORY OF PRESENT ILLNESS
[Does patient monitor blood pressure at home?] : patient monitors blood pressure at home [Low sodium diet] : low sodium diet [Other diet strategies] : other diet strategies [Weight management] : weight management [Patient will have a blood pressure < 130/80 mmHg] : patient will have a blood pressure < 130/80 mmHg [Diet changes including (healthy heart eating, less processed foods, low sodium diet)] : Diet changes including (healthy heart eating, less processed foods, low sodium diet) [Healthy weight (management maintenance)] : Healthy weight (management maintenance) [Define hypertension] : define hypertension [Exercise and blood pressure] : exercise and blood pressure [Processed food] : processed food [Yes, continue with Hypertension plan] : Yes, continue with Hypertension plan [Height: ___] : Height: [unfilled] [None] : none [CABG] : CABG [Age] : age [HLD] : HLD [Hypertension] : hypertension [Sedentary] : sedentary [Overweight/Obesity] : overweight/obesity [Angina with exercise] : no angina with exercise [Restrict upper body until ___] : restrict upper body until [unfilled] [Home-based] : Home-based [___ Days per week] : [unfilled] days per week [>= 31 minutes per session] : >= 31 minutes per session [Target RPE: ___] : Target RPE: [unfilled] [Treadmill] : treadmill [Recumbent bike] : recumbent bike [Upright exercise bike] : upright exercise bike [BioStep] : BioStep [Elliptical] : elliptical [Upper body ergometer] : upper body ergometer [Stair Climber] : stair climber [Assess in ___ weeks] : assess in [unfilled] weeks [Stretching/ROM exercises and balance exercises daily] : Stretching/ROM exercises and balance exercises daily [Will assess for musculoskeletal and other restrictions] : will assess for musculoskeletal and other restrictions [To start resistance training] : to start resistance training [To increase my time spent in physical activity and/or aerobic exercise] : to increase my time spent in physical activity and/or aerobic exercise [Individualized Exercise Rx] : individualized exercise Rx [Teach back utilized] : teach back utilized [Welcome packet provided] : welcome packet provided [Yes, per exercise prescription, policy] : Yes, per exercise prescription, policy [FreeTextEntry1] : CT Surgeon: Dr. Torres [FreeTextEntry5] : Dr. Reyes [de-identified] : Intake: Patient will be participating in virtual cardiac rehab. Cardiac Rehabilitation Phase 2 - Virtual sessions\par Session #1 and #36: in person\par \par  [Has the patient given CR team permission to speak with the emergency  about the patient?] : Has the patient given CR team permission to speak with the emergency  about the patient? Yes [Does patient have advance directive?] : Does patient have advance directive? Yes [Does patient see mental health provider?] : Does patient see mental health provider? No [Self-reports of improved psychosocial well-being] : Self-reports of improved psychosocial well-being [Discuss overview of emotional health supportive modalities] : Discuss overview of emotional health supportive modalities [Yes, continue with psychosocial plan] : Yes, continue with psychosocial plan [FreeTextEntry6] : none [FreeTextEntry9] : INTAKE: Patient endorses improved emotional well-being [Action] : Stage of change: Action [Overweight] : Overweight [] : yes [Sugar] : sugar [Sodium] : sodium [Trans fats/saturated fats] : trans fats/saturated fats [Is patient on medication for hyperlipidemia?] : Is patient on medication for hyperlipidemia? Yes [Atorvastatin] : atorvastatin [Is Patient adherent with medication?] : patient is adherent with medication [Patient has seen registered dietitian in the past?] : Patient has seen registered dietitian in the past? No [Patient is interested in seeing a registered dietitian?] : Patient is interested in seeing a registered dietitian? Yes [Self-reports of improved health eating patterns] : Self-reports of improved health eating patterns [Discuss an overview of healthy eating plan] : Discuss an overview of healthy eating plan [Mediterranean Diet] : Mediterranean diet [MyPlate.gov] : MyPlate.gov [Yes, continue with nutrition plan] : Yes, continue with nutrition plan [In progress] : in progress [FreeTextEntry8] : INTAKE: Patient has appointment with RD this month.

## 2023-04-04 NOTE — DATA REVIEWED
[FreeTextEntry1] : EC23 sinus rhythm rightward axis \par Stress Test: 18 stage 3, 8 minutes \par Echo: 18 moderately enlarge right ventricle, mild Pulmonic valve regurgitation ejection fraction 55 to 60%\par 3/9/2023 normal left ventricular function postoperative septal motion

## 2023-04-04 NOTE — PLAN
[FreeTextEntry1] : Cardiac Rehabilitation Phase 2: virtual program\par Focus assessment and physical exam at session #1\par ITP initiated- confer with Dr. Freitas\par \par All questions answered. \par Welcome packet mailed.\par Session # 1 in-person: 4/11/23 at 130pm\par home exercise modality: floor aerobics\par \par \par

## 2023-04-04 NOTE — REVIEW OF SYSTEMS
[Recent Change In Weight] : ~T recent weight change [Shortness Of Breath] : shortness of breath [Negative] : Psychiatric [FreeTextEntry2] : intentional weight loss of approximately 19 pounds endorsed [FreeTextEntry6] : mild shortness of breath with activity [de-identified] : states incisions healing well, no erythema, dry /intact [de-identified] : occasional lightheadedness with changing position

## 2023-04-04 NOTE — ASSESSMENT
[FreeTextEntry1] : Patient is a 72 year old man, past medical h/o HLD, HTN, pre-diabetes, PAD, BPH, bilateral total hip replacements, eye surgeries for cataracts and detached retina; s/p CABG x3 on 3/6/23, with post-op course notable for anemia/transfusions, hypovolemia, cardiogenic shock/arrest,followed by PAUL in 2 minutes with CPR and return to OR for bleeding on 3/7/23. Patient subsequently did well, was discharged on 3/14/23 to [facility]. \par \par Patient currently stable/appropriate referral for virtual cardiac rehab

## 2023-04-04 NOTE — REASON FOR VISIT
[Assessment] : an assessment [Home] : at home, [unfilled] , at the time of the visit. [Medical Office: ([facility])___] : at the medical office located in  [Verbal consent obtained from patient] : the patient, [unfilled] [Intake Visit] : an intake visit [FreeTextEntry1] : ITP to be reviewed and manually signed by Dr. Amaya; ITP to be finalized at session #1; Clinical indication for cardiac rehabilitation: PCI

## 2023-04-11 ENCOUNTER — APPOINTMENT (OUTPATIENT)
Dept: CARDIOLOGY | Facility: CLINIC | Age: 73
End: 2023-04-11
Payer: MEDICARE

## 2023-04-11 ENCOUNTER — NON-APPOINTMENT (OUTPATIENT)
Age: 73
End: 2023-04-11

## 2023-04-11 PROCEDURE — 93797 PHYS/QHP OP CAR RHAB WO ECG: CPT | Mod: 59

## 2023-04-11 PROCEDURE — 93798 PHYS/QHP OP CAR RHAB W/ECG: CPT

## 2023-04-12 ENCOUNTER — APPOINTMENT (OUTPATIENT)
Dept: CARDIOLOGY | Facility: CLINIC | Age: 73
End: 2023-04-12
Payer: MEDICARE

## 2023-04-12 PROCEDURE — 93798 PHYS/QHP OP CAR RHAB W/ECG: CPT

## 2023-04-13 ENCOUNTER — TRANSCRIPTION ENCOUNTER (OUTPATIENT)
Age: 73
End: 2023-04-13

## 2023-04-13 ENCOUNTER — APPOINTMENT (OUTPATIENT)
Dept: CARDIOLOGY | Facility: CLINIC | Age: 73
End: 2023-04-13
Payer: MEDICARE

## 2023-04-13 PROCEDURE — 93798 PHYS/QHP OP CAR RHAB W/ECG: CPT

## 2023-04-13 NOTE — HISTORY OF PRESENT ILLNESS
[Does patient monitor blood pressure at home?] : patient monitors blood pressure at home [Low sodium diet] : low sodium diet [Other diet strategies] : other diet strategies [Weight management] : weight management [Patient will have a blood pressure < 130/80 mmHg] : patient will have a blood pressure < 130/80 mmHg [Diet changes including (healthy heart eating, less processed foods, low sodium diet)] : Diet changes including (healthy heart eating, less processed foods, low sodium diet) [Healthy weight (management maintenance)] : Healthy weight (management maintenance) [Define hypertension] : define hypertension [Exercise and blood pressure] : exercise and blood pressure [Processed food] : processed food [Yes, continue with Hypertension plan] : Yes, continue with Hypertension plan [Height: ___] : Height: [unfilled] [None] : none [CABG] : CABG [Age] : age [HLD] : HLD [Hypertension] : hypertension [Sedentary] : sedentary [Overweight/Obesity] : overweight/obesity [Restrict upper body until ___] : restrict upper body until [unfilled] [Home-based] : Home-based [___ Days per week] : [unfilled] days per week [>= 31 minutes per session] : >= 31 minutes per session [Target RPE: ___] : Target RPE: [unfilled] [Treadmill] : treadmill [Recumbent bike] : recumbent bike [Upright exercise bike] : upright exercise bike [BioStep] : BioStep [Elliptical] : elliptical [Upper body ergometer] : upper body ergometer [Stair Climber] : stair climber [Assess in ___ weeks] : assess in [unfilled] weeks [Stretching/ROM exercises and balance exercises daily] : Stretching/ROM exercises and balance exercises daily [Will assess for musculoskeletal and other restrictions] : will assess for musculoskeletal and other restrictions [To start resistance training] : to start resistance training [To increase my time spent in physical activity and/or aerobic exercise] : to increase my time spent in physical activity and/or aerobic exercise [Individualized Exercise Rx] : individualized exercise Rx [Teach back utilized] : teach back utilized [Yes, per exercise prescription, policy] : Yes, per exercise prescription, policy [Has the patient given CR team permission to speak with the emergency  about the patient?] : Has the patient given CR team permission to speak with the emergency  about the patient? Yes [Does patient have advance directive?] : Does patient have advance directive? Yes [Self-reports of improved psychosocial well-being] : Self-reports of improved psychosocial well-being [Discuss overview of emotional health supportive modalities] : Discuss overview of emotional health supportive modalities [Yes, continue with psychosocial plan] : Yes, continue with psychosocial plan [Overweight] : Overweight [Sugar] : sugar [Sodium] : sodium [Trans fats/saturated fats] : trans fats/saturated fats [Is patient on medication for hyperlipidemia?] : Is patient on medication for hyperlipidemia? Yes [Atorvastatin] : atorvastatin [Is Patient adherent with medication?] : patient is adherent with medication [Patient is interested in seeing a registered dietitian?] : Patient is interested in seeing a registered dietitian? Yes [Self-reports of improved health eating patterns] : Self-reports of improved health eating patterns [Discuss an overview of healthy eating plan] : Discuss an overview of healthy eating plan [Mediterranean Diet] : Mediterranean diet [MyPlate.gov] : MyPlate.gov [Yes, continue with nutrition plan] : Yes, continue with nutrition plan [Maintenance] : Stage of change: maintenance [Recent history of weight gain or weight loss?] : Recent history of weight gain or weight loss? yes [Yes, continue with Weight Management plan] : Yes, continue with weight management plan [In progress] : in progress [BP: ____ mmHg] : BP: [unfilled] mmHg [HR: ____ bpm] : BP: [unfilled] bpm [O2sat: ____ %] : O2sat: [unfilled] % [RPE: ____] : RPE: [unfilled]  [METS: ____] : METS: [unfilled]  [Equipment Orientation/Safety] : equipment orientation/safety [Exercise Benefits/Guidelines education] : exercise benefits/guidelines education [Signs/Symptoms to report] : signs/symptoms to report [Hemodynamic responses] : hemodynamic responses [Home exercise] : home exercise [Patient verbalizes understanding of exercise education all questions answered] : Patient verbalizes understanding of exercise education all questions answered [Action] : Stage of change: action [PHQ-9: ___] : PHQ-9: [unfilled] [Feelings Score: ___] : Feelings Score: [unfilled] [Physical Fitness Score: ____] : Physical Fitness Score: [unfilled] [Daily Activities Score: ___] : Daily Activities Score: [unfilled] [Social Activities Score: ___] : Social Activities Score: [unfilled] [Pain Score: ___] : Pain Score: [unfilled] [Overall Health Score: ___] : Overall Health Score: [unfilled] [Change in Health Score: ___] : Change in Health Score: [unfilled] [Quality of Life Score: ___] : Quality of Life Score: [unfilled] [Social Support Score: ___] : Social Support Score: [unfilled] [Angina with exercise] : no angina with exercise [] : no [FreeTextEntry5] : Dr. Reyes [de-identified] : Intake: Patient will be participating in virtual cardiac rehab. Cardiac Rehabilitation Phase 2 - Virtual sessions\par Session #1 and #36: in person\par 4/11/23: Patient exercised on treadmill for 32:24 minutes, assessment via incremental progression of speed and grade until RPE 13 achieved; pt tolerated well, denied complaints. \par \par  [Does patient see mental health provider?] : Does patient see mental health provider? No [FreeTextEntry6] : none [FreeTextEntry9] : INTAKE: Patient endorses improved emotional well-being [Patient has seen registered dietitian in the past?] : Patient has seen registered dietitian in the past? No [FreeTextEntry8] : INTAKE: Patient has appointment with RD this month. [FreeTextEntry1] : Patient is a 72 year old man, past medical h/o HLD, HTN, pre-diabetes, PAD, BPH, bilateral total hip replacements, eye surgeries for cataracts and detached retina who had cardiac work-up and consequent CABG x3 on 3/6/23, with post-op course notable  return to OR for bleeding on 3/7/23,  anemia/transfusions, hypovolemia, cardiogenic shock/arrest,followed by PAUL in 2 minutes with CPR. Patient subsequently did well, was discharged on 3/14/23 to [facility]. \par 4/3/23: Patient presents for phone intake, denies complaints, states he is using incentive spirometer, walking in the house and the yard/street (one block), endorses mild shortness of breath with activity, improving emotional well-being, states he is eating heart healthfully and his incision are healing well. He was referred by Dr. Torres for virtual cardiac rehab. \par 4/11/23: patient presents for in-person session, denies focal complaints, walking, improving appetite and improving emotional well-being.

## 2023-04-13 NOTE — PLAN
[FreeTextEntry1] : Cardiac Rehabilitation Phase 2: combined center-based (initial 8 sessions) followed by virtual program\par \par ITP - confer with Dr. Freitas\par \par \par See session report for details.\par \par \par

## 2023-04-13 NOTE — ASSESSMENT
[FreeTextEntry1] : Patient is a 72 year old man, past medical h/o HLD, HTN, pre-diabetes, PAD, BPH, bilateral total hip replacements, eye surgeries for cataracts and detached retina who had cardiac work-up and consequent CABG x3 on 3/6/23, with post-op course notable for return to OR for bleeding on 3/7/23,  anemia/transfusions, hypovolemia, cardiogenic shock/arrest,followed by PAUL in 2 minutes with CPR. Patient subsequently did well, was discharged on 3/14/23 to [facility].\par \par Patient currently stable/appropriate referral for cardiac rehab

## 2023-04-13 NOTE — PHYSICAL EXAM
[No Acute Distress] : no acute distress [Well Nourished] : well nourished [Well-Appearing] : well-appearing [No JVD] : no jugular venous distention [Supple] : supple [Clear to Auscultation] : lungs were clear to auscultation bilaterally [Normal Rate] : normal rate  [Regular Rhythm] : with a regular rhythm [No Carotid Bruits] : no carotid bruits [Soft] : abdomen soft [Non Tender] : non-tender [Non-distended] : non-distended [No CVA Tenderness] : no CVA  tenderness [No Spinal Tenderness] : no spinal tenderness [Grossly Normal Strength/Tone] : grossly normal strength/tone [Coordination Grossly Intact] : coordination grossly intact [No Focal Deficits] : no focal deficits [Normal Gait] : normal gait [Normal Affect] : the affect was normal [de-identified] : sternal incision clean, dry, intact, without erythema

## 2023-04-13 NOTE — PLAN
After Visit Summary   8/21/2018    Suresh Powers    MRN: [id number]           Patient Information     Date Of Birth          1962        Visit Information        Provider Department      8/21/2018 10:30 AM Kelly Carpenter LP [facility]      Today's Diagnoses     Obsessive-compulsive disorder, unspecified type    -  1    Moderate episode of recurrent major depressive disorder (H)        Generalized anxiety disorder           Follow-ups after your visit        Your next 10 appointments already scheduled     Sep 11, 2018 11:30 AM CDT   Return Visit with Kelly Carpenter LP   [address] [address]   [phone]            Sep 25, 2018 10:30 AM CDT   Return Visit with Kelly Carpenter LP   [address] [address]   [phone]            Oct 16, 2018 10:30 AM CDT   Return Visit with Kelly Carpenter LP   [facility] ([address] [address]   [phone]            Feb 21, 2019 10:20 AM CST   Office [facility] with LUIS Connolly [facility], CNP   [facility]    [address] [facility] [address] [phone]           Bring a current list of meds and any records pertaining to this visit. For Physicals, please bring immunization records and any forms needing to be filled out. Please arrive 10 minutes early to complete paperwork.              Who to contact     If you have questions or need follow up information about today's clinic visit or your schedule please contact [facility] directly at [phone].  Normal or non-critical lab and imaging results will be communicated to you by MyChart, letter or phone within 4 business days after the clinic has received  the results. If you do not hear from us within 7 days, please contact the clinic through [employer] or phone. If you have a critical or abnormal lab result, we will notify you by phone as soon as possible.  Submit refill requests through [employer] or call your pharmacy and they will forward the refill request to us. Please allow 3 business days for your refill to be completed.          Additional Information About Your Visit        Care EveryWhere ID     This is your Care EveryWhere ID. This could be used by other organizations to access your [facility] medical records  [id number]         Blood Pressure from Last 3 Encounters:   08/16/18 (!) 136/96   05/24/18 128/72   05/10/18 136/80    Weight from Last 3 Encounters:   08/16/18 75.8 kg (167 lb 1.6 oz)   05/10/18 75.2 kg (165 lb 11.2 oz)   05/05/18 73.3 kg (161 lb 9.6 oz)              Today, you had the following     No orders found for display       Primary Care Provider Office Phone # Fax #    Carmen LU[facility] [phone]       [address] [facility] [address]        Equal Access to Services     [facility] : Hadii aad ku hadasho Soomaali, waaxda luqadaha, qaybta kaalmada adeegyada, tramaine krishnan hayrockn fidel hazel. So [facility] [phone].    ATENCIÓN: Si habla español, tiene a regalado disposición servicios gratuitos de asistencia lingüística. Sera al [phone].    We comply with applicable federal civil rights laws and [place] laws. We do not discriminate on the basis of race, color, national origin, age, disability, sex, sexual orientation, or gender identity.            Thank you!     Thank you for choosing [facility]  for your care. Our goal is always to provide you with excellent care. Hearing back from our patients is one way we can continue to improve our services. Please take a few minutes to complete the written survey that you may receive in the mail after your visit with us. Thank you!              Your Updated Medication List - Protect others around you: Learn how to safely use, store and throw away your medicines at www.disposemymeds.org.          This list is accurate as of 8/21/18 11:31 AM.  Always use your most recent med list.                   Brand Name Dispense Instructions for use Diagnosis    * albuterol (2.5 MG/3ML) 0.083% neb solution     25 vial    Take 1 vial (2.5 mg) by nebulization every 4 hours as needed for shortness of breath / dyspnea or wheezing    Pneumonia of left lower lobe due to infectious organism (H), Shortness of breath       * albuterol (2.5 MG/3ML) 0.083% neb solution     50 vial    Take 1 vial (2.5 mg) by nebulization every 4 hours as needed for shortness of breath / dyspnea or wheezing    Wheezing       * albuterol 108 (90 Base) MCG/ACT inhaler    PROAIR HFA/PROVENTIL HFA/VENTOLIN HFA    1 Inhaler    Inhale 2 puffs into the lungs every 4 hours as needed    Wheezing       amLODIPine 10 MG tablet    NORVASC    30 tablet    Take 1 tablet (10 mg) by mouth daily    HTN, goal below 140/90       busPIRone 15 MG tablet    BUSPAR    60 tablet    Take 1 tablet (15 mg) by mouth 2 times daily    Anxiety attack, Depression, unspecified depression type       chlorthalidone 25 MG tablet    HYGROTON    30 tablet    Take 1 tablet (25 mg) by mouth daily    HTN, goal below 140/90       escitalopram 10 MG tablet    LEXAPRO     TAKE 1 TABLET BY MOUTH ONE TIME DAILY    Anxiety attack, Depression, unspecified depression type       fluticasone 50 MCG/ACT spray    FLONASE    16 g    Spray 2 sprays into both nostrils daily    Rhinorrhea       lamoTRIgine 100 MG tablet    LaMICtal          MULTIVITAMIN ADULT PO      Take 1 tablet by mouth daily        traZODone 50 MG tablet    DESYREL    90 tablet    Take 1-4 tablets ( mg) by mouth At Bedtime    Sleep disorder       TYLENOL PO      Take 1,000 mg by mouth every 8 hours as needed        VITAMIN C PO      Take 500 mg by mouth daily        zinc  50 MG Tabs      Take 100 mg by mouth daily        * Notice:  This list has 3 medication(s) that are the same as other medications prescribed for you. Read the directions carefully, and ask your doctor or other care provider to review them with you.       [FreeTextEntry1] : Cardiac Rehabilitation Phase 2: combined center-based (initial 8 sessions) followed by virtual program\par \par ITP - confer with Dr. Freitas\par \par \par See session report for details.\par \par \par

## 2023-04-13 NOTE — REVIEW OF SYSTEMS
[Recent Change In Weight] : ~T recent weight change [Shortness Of Breath] : shortness of breath [Negative] : Psychiatric [FreeTextEntry2] : intentional weight loss of approximately 19 pounds endorsed [FreeTextEntry6] : mild shortness of breath with activity [de-identified] : states incisions healing well, no erythema, dry /intact [de-identified] : occasional lightheadedness with changing position

## 2023-04-13 NOTE — REASON FOR VISIT
[Assessment] : an assessment [Intake Visit] : an intake visit [FreeTextEntry1] : 4/11/23 in -person session for virtual cardiac rehab

## 2023-04-13 NOTE — PHYSICAL EXAM
[No Acute Distress] : no acute distress [Well Nourished] : well nourished [Well-Appearing] : well-appearing [No JVD] : no jugular venous distention [Supple] : supple [Clear to Auscultation] : lungs were clear to auscultation bilaterally [Normal Rate] : normal rate  [Regular Rhythm] : with a regular rhythm [No Carotid Bruits] : no carotid bruits [Soft] : abdomen soft [Non Tender] : non-tender [Non-distended] : non-distended [No CVA Tenderness] : no CVA  tenderness [No Spinal Tenderness] : no spinal tenderness [Grossly Normal Strength/Tone] : grossly normal strength/tone [Coordination Grossly Intact] : coordination grossly intact [No Focal Deficits] : no focal deficits [Normal Gait] : normal gait [Normal Affect] : the affect was normal [de-identified] : sternal incision clean, dry, intact, without erythema

## 2023-04-13 NOTE — HISTORY OF PRESENT ILLNESS
[Does patient monitor blood pressure at home?] : patient monitors blood pressure at home [Low sodium diet] : low sodium diet [Other diet strategies] : other diet strategies [Weight management] : weight management [Patient will have a blood pressure < 130/80 mmHg] : patient will have a blood pressure < 130/80 mmHg [Diet changes including (healthy heart eating, less processed foods, low sodium diet)] : Diet changes including (healthy heart eating, less processed foods, low sodium diet) [Healthy weight (management maintenance)] : Healthy weight (management maintenance) [Define hypertension] : define hypertension [Exercise and blood pressure] : exercise and blood pressure [Processed food] : processed food [Yes, continue with Hypertension plan] : Yes, continue with Hypertension plan [Height: ___] : Height: [unfilled] [None] : none [CABG] : CABG [Age] : age [HLD] : HLD [Hypertension] : hypertension [Sedentary] : sedentary [Overweight/Obesity] : overweight/obesity [Restrict upper body until ___] : restrict upper body until [unfilled] [Home-based] : Home-based [___ Days per week] : [unfilled] days per week [>= 31 minutes per session] : >= 31 minutes per session [Target RPE: ___] : Target RPE: [unfilled] [Treadmill] : treadmill [Recumbent bike] : recumbent bike [Upright exercise bike] : upright exercise bike [BioStep] : BioStep [Elliptical] : elliptical [Upper body ergometer] : upper body ergometer [Stair Climber] : stair climber [Assess in ___ weeks] : assess in [unfilled] weeks [Stretching/ROM exercises and balance exercises daily] : Stretching/ROM exercises and balance exercises daily [Will assess for musculoskeletal and other restrictions] : will assess for musculoskeletal and other restrictions [To start resistance training] : to start resistance training [To increase my time spent in physical activity and/or aerobic exercise] : to increase my time spent in physical activity and/or aerobic exercise [Individualized Exercise Rx] : individualized exercise Rx [Teach back utilized] : teach back utilized [Yes, per exercise prescription, policy] : Yes, per exercise prescription, policy [Has the patient given CR team permission to speak with the emergency  about the patient?] : Has the patient given CR team permission to speak with the emergency  about the patient? Yes [Does patient have advance directive?] : Does patient have advance directive? Yes [Self-reports of improved psychosocial well-being] : Self-reports of improved psychosocial well-being [Discuss overview of emotional health supportive modalities] : Discuss overview of emotional health supportive modalities [Yes, continue with psychosocial plan] : Yes, continue with psychosocial plan [Overweight] : Overweight [Sugar] : sugar [Sodium] : sodium [Trans fats/saturated fats] : trans fats/saturated fats [Is patient on medication for hyperlipidemia?] : Is patient on medication for hyperlipidemia? Yes [Atorvastatin] : atorvastatin [Is Patient adherent with medication?] : patient is adherent with medication [Patient is interested in seeing a registered dietitian?] : Patient is interested in seeing a registered dietitian? Yes [Self-reports of improved health eating patterns] : Self-reports of improved health eating patterns [Discuss an overview of healthy eating plan] : Discuss an overview of healthy eating plan [Mediterranean Diet] : Mediterranean diet [MyPlate.gov] : MyPlate.gov [Yes, continue with nutrition plan] : Yes, continue with nutrition plan [Maintenance] : Stage of change: maintenance [Recent history of weight gain or weight loss?] : Recent history of weight gain or weight loss? yes [Yes, continue with Weight Management plan] : Yes, continue with weight management plan [In progress] : in progress [BP: ____ mmHg] : BP: [unfilled] mmHg [HR: ____ bpm] : BP: [unfilled] bpm [O2sat: ____ %] : O2sat: [unfilled] % [RPE: ____] : RPE: [unfilled]  [METS: ____] : METS: [unfilled]  [Equipment Orientation/Safety] : equipment orientation/safety [Exercise Benefits/Guidelines education] : exercise benefits/guidelines education [Signs/Symptoms to report] : signs/symptoms to report [Hemodynamic responses] : hemodynamic responses [Home exercise] : home exercise [Patient verbalizes understanding of exercise education all questions answered] : Patient verbalizes understanding of exercise education all questions answered [Action] : Stage of change: action [PHQ-9: ___] : PHQ-9: [unfilled] [Feelings Score: ___] : Feelings Score: [unfilled] [Physical Fitness Score: ____] : Physical Fitness Score: [unfilled] [Daily Activities Score: ___] : Daily Activities Score: [unfilled] [Social Activities Score: ___] : Social Activities Score: [unfilled] [Pain Score: ___] : Pain Score: [unfilled] [Overall Health Score: ___] : Overall Health Score: [unfilled] [Change in Health Score: ___] : Change in Health Score: [unfilled] [Quality of Life Score: ___] : Quality of Life Score: [unfilled] [Social Support Score: ___] : Social Support Score: [unfilled] [Angina with exercise] : no angina with exercise [] : no [FreeTextEntry5] : Dr. Reyes [de-identified] : Intake: Patient will be participating in virtual cardiac rehab. Cardiac Rehabilitation Phase 2 - Virtual sessions\par Session #1 and #36: in person\par 4/11/23: Patient exercised on treadmill for 32:24 minutes, assessment via incremental progression of speed and grade until RPE 13 achieved; pt tolerated well, denied complaints. \par \par  [Does patient see mental health provider?] : Does patient see mental health provider? No [FreeTextEntry6] : none [FreeTextEntry9] : INTAKE: Patient endorses improved emotional well-being [Patient has seen registered dietitian in the past?] : Patient has seen registered dietitian in the past? No [FreeTextEntry8] : INTAKE: Patient has appointment with RD this month. [FreeTextEntry1] : Patient is a 72 year old man, past medical h/o HLD, HTN, pre-diabetes, PAD, BPH, bilateral total hip replacements, eye surgeries for cataracts and detached retina who had cardiac work-up and consequent CABG x3 on 3/6/23, with post-op course notable  return to OR for bleeding on 3/7/23,  anemia/transfusions, hypovolemia, cardiogenic shock/arrest,followed by PAUL in 2 minutes with CPR. Patient subsequently did well, was discharged on 3/14/23 to [facility]. \par 4/3/23: Patient presents for phone intake, denies complaints, states he is using incentive spirometer, walking in the house and the yard/street (one block), endorses mild shortness of breath with activity, improving emotional well-being, states he is eating heart healthfully and his incision are healing well. He was referred by Dr. Torres for virtual cardiac rehab. \par 4/11/23: patient presents for in-person session, denies focal complaints, walking, improving appetite and improving emotional well-being.

## 2023-04-13 NOTE — REVIEW OF SYSTEMS
[Recent Change In Weight] : ~T recent weight change [Shortness Of Breath] : shortness of breath [Negative] : Psychiatric [FreeTextEntry2] : intentional weight loss of approximately 19 pounds endorsed [FreeTextEntry6] : mild shortness of breath with activity [de-identified] : states incisions healing well, no erythema, dry /intact [de-identified] : occasional lightheadedness with changing position

## 2023-04-14 ENCOUNTER — APPOINTMENT (OUTPATIENT)
Dept: CARDIOTHORACIC SURGERY | Facility: CLINIC | Age: 73
End: 2023-04-14
Payer: MEDICARE

## 2023-04-14 PROCEDURE — 97802 MEDICAL NUTRITION INDIV IN: CPT | Mod: 95

## 2023-04-17 ENCOUNTER — APPOINTMENT (OUTPATIENT)
Dept: CARDIOLOGY | Facility: CLINIC | Age: 73
End: 2023-04-17
Payer: MEDICARE

## 2023-04-17 PROCEDURE — 93797 PHYS/QHP OP CAR RHAB WO ECG: CPT

## 2023-04-18 ENCOUNTER — APPOINTMENT (OUTPATIENT)
Dept: UROLOGY | Facility: CLINIC | Age: 73
End: 2023-04-18
Payer: MEDICARE

## 2023-04-18 VITALS
HEART RATE: 80 BPM | BODY MASS INDEX: 27.35 KG/M2 | HEIGHT: 70 IN | OXYGEN SATURATION: 98 % | TEMPERATURE: 97.2 F | DIASTOLIC BLOOD PRESSURE: 78 MMHG | WEIGHT: 191 LBS | SYSTOLIC BLOOD PRESSURE: 122 MMHG

## 2023-04-18 PROCEDURE — 99213 OFFICE O/P EST LOW 20 MIN: CPT

## 2023-04-18 RX ORDER — FOLIC ACID 1 MG/1
1 TABLET ORAL DAILY
Refills: 0 | Status: DISCONTINUED | COMMUNITY
Start: 2023-03-20 | End: 2023-04-18

## 2023-04-18 RX ORDER — CHLORHEXIDINE GLUCONATE 4 %
325 (65 FE) LIQUID (ML) TOPICAL DAILY
Qty: 30 | Refills: 0 | Status: DISCONTINUED | COMMUNITY
Start: 2023-03-20 | End: 2023-04-18

## 2023-04-18 NOTE — ASSESSMENT
[FreeTextEntry1] : Mr. LEONG is a 72 year  White  M who comes today to clinic for elevated PSA to 5.18>4.47 referred by Dr. Reyes. mpMRI with diffuse inflammation PIRADS 2. Improved LUTS, will continue flomax.\par \par The patient understands that PSA is a marker of cancer risk but does not diagnose cancer. He also understands that there are a number of benign conditions including UTI, BPH, certain activities and prostatitis that will increase PSA in the absence of cancer. Unfortunately the only way to definitively diagnose cancer is with a prostate biopsy. We discussed the method of performing biopsy (transrectally with local anesthesia) and the risks (bleeding, infection, urinary retention, ED). In addition to this, the patient and I discussed the discrepancy between the prevalence of prostate cancer and the prevalence of death from prostate cancer.  Prostate cancer is the most common solid tumor in American men. However, we discussed that it can be very slow growing, many men with prostate cancer will die with the disease rather than from it.\par \par I gave the patient the following options: \par 1. Do nothing. This could miss an undiagnosed prostate cancer which may have little effect or significant effects at a later date. \par 2. Recheck his PSA in 3-6 months. We would then regroup and have this discussion about his options again. \par 3. Perform a prostate biopsy: I explained how a prostate biopsy is performed and the risks including bleeding and infection and difficulty voiding. \par \par Based on our discussion the patient decided to proceed with PSA . \par \par Today we discussed that BPH is a condition of prostatic enlargement that blocks the bladder outlet and can make it difficult to void. Over time this can manifest itself as urinary frequency, urgency, straining to void, poor stream, nocturia, high PVR's and retention. While BPH is related to prostate volume (increasing prostate volume results in increased likelihood of complications from BPH), there are many men with "normal volume" prostates that have symptoms of occlusion.\par \par We discussed the effects that BPH can cause on the bladder: BPH can cause detrusor overactivity which results in urgency and frequency and in some cases, incontinence. Over a longer time,some men may develop large volume/acontractile bladders, while other men develop small or normal volume bladders with loss of compliance. Neither is ideal and both represent end stage bladder damage that can not be fixed and can cause kidney injury. I explained the 3 main jobs of the bladder which are to 1) store urine, 2) evacuate urine and 3) keep urine at low pressure to prevent upper tract injury. I explained the mechanisms of urinary tract infections, hydronephrosis and kidney injury due to urinary retention.  These consequences can be severe, irreversible and even life threatening.\par \par We then discussed the treatment options for BPH. We generally start with medical management and proceed to surgery if necessary. Alpha blockers act to relax the prostate while 5-alpha reductase inhibitors (5-DAYNE’s) shrink the prostate. 5-DAYNE's are most effective in patients who have prostates that are >40gm. The former are fairly fast acting (in a matter of days-weeks) while the latter can take up to 3-6 months to take effect.\par \par Anticholinergics and Myrbetriq merely treat the bladder symptoms that are caused by the sequelae of outlet obstruction. They "relax" the bladder and they can weaken the urine stream and make retention worse or precipitate retention. They do not address the underlying cause of overactivity in this setting but do ease bladder spasms and can help with urgency and frequency. They are useful in certain carefully selected patients.\par \par If medical management fails, the patient can not wait for medical management to work, or the patient has YINKA caused by retention, he may consider surgery. There are different surgical options including transurethral resection of the prostate, Greenlight laser prostatectomy, Aquablation, Minimally invasive surgical therapies like Urolift and Rezum, and simple prostatectomy. We discussed the risks including bleeding, infection, damage to the urethra, bladder and ureteral orifices, scar tissue, leaking (either due to injuring the sphincter or lowering bladder outlet obstruction with concomitant detrusor overactivity) and retrograde ejaculation. The patient understands that some of these complications are reversible while some are not and may require additional procedures.\par \par Based on our discussion the patient wishes to proceed with this plan.\par \par \par \par

## 2023-04-18 NOTE — HISTORY OF PRESENT ILLNESS
[FreeTextEntry1] : Mr. LEONG is a 72 year  White  M who comes today to clinic for elevated PSA to 4.5 referred by Dr. Reyes.\par Has no family history of Prostate Cancer.  \par Moderate LUTS improving on tamsulosin for 3 weeks.\par \par \par 2/7/23\par \par mpMRI:\par Multiple linear/wedge-shaped areas of T2 hypointense signal, likely inflammatory.\par PIRADS 2 - Low (clinically significant cancer is unlikely to be present)\par Prostate Volume: 75 mL\par PSA density: 0.06 ng/mL/mL\par \par LUTS remarkably improved with tamsulosin\par \par \par 4/18/23\par s/p Triple CABG\par on tamsulosin x1, No LUTs while on the medication\par mpMRI:\par Multiple linear/wedge-shaped areas of T2 hypointense signal, likely inflammatory.\par PIRADS 2 - Low (clinically significant cancer is unlikely to be present)\par Prostate Volume: 75 mL\par PSA density: 0.06 ng/mL/mL\par Last PSA 4.47

## 2023-04-18 NOTE — REASON FOR VISIT
[Assessment] : an assessment [FreeTextEntry1] : session #1: ITP to be reviewed and manually signed by Dr. Amaya; Clinical indication for cardiac rehabilitation: PCI

## 2023-04-18 NOTE — HISTORY OF PRESENT ILLNESS
[Does patient monitor blood pressure at home?] : patient monitors blood pressure at home [Low sodium diet] : low sodium diet [Other diet strategies] : other diet strategies [Weight management] : weight management [Patient will have a blood pressure < 130/80 mmHg] : patient will have a blood pressure < 130/80 mmHg [Diet changes including (healthy heart eating, less processed foods, low sodium diet)] : Diet changes including (healthy heart eating, less processed foods, low sodium diet) [Healthy weight (management maintenance)] : Healthy weight (management maintenance) [Define hypertension] : define hypertension [Exercise and blood pressure] : exercise and blood pressure [Processed food] : processed food [Yes, continue with Hypertension plan] : Yes, continue with Hypertension plan [Height: ___] : Height: [unfilled] [None] : none [CABG] : CABG [Age] : age [HLD] : HLD [Hypertension] : hypertension [Sedentary] : sedentary [Overweight/Obesity] : overweight/obesity [Restrict upper body until ___] : restrict upper body until [unfilled] [Home-based] : Home-based [___ Days per week] : [unfilled] days per week [>= 31 minutes per session] : >= 31 minutes per session [Target RPE: ___] : Target RPE: [unfilled] [Treadmill] : treadmill [Recumbent bike] : recumbent bike [Upright exercise bike] : upright exercise bike [BioStep] : BioStep [Elliptical] : elliptical [Upper body ergometer] : upper body ergometer [Assess in ___ weeks] : assess in [unfilled] weeks [Stair Climber] : stair climber [Will assess for musculoskeletal and other restrictions] : will assess for musculoskeletal and other restrictions [Stretching/ROM exercises and balance exercises daily] : Stretching/ROM exercises and balance exercises daily [To start resistance training] : to start resistance training [To increase my time spent in physical activity and/or aerobic exercise] : to increase my time spent in physical activity and/or aerobic exercise [Individualized Exercise Rx] : individualized exercise Rx [Teach back utilized] : teach back utilized [Yes, per exercise prescription, policy] : Yes, per exercise prescription, policy [Has the patient given CR team permission to speak with the emergency  about the patient?] : Has the patient given CR team permission to speak with the emergency  about the patient? Yes [Does patient have advance directive?] : Does patient have advance directive? Yes [Self-reports of improved psychosocial well-being] : Self-reports of improved psychosocial well-being [Discuss overview of emotional health supportive modalities] : Discuss overview of emotional health supportive modalities [Yes, continue with psychosocial plan] : Yes, continue with psychosocial plan [Overweight] : Overweight [Sugar] : sugar [Sodium] : sodium [Trans fats/saturated fats] : trans fats/saturated fats [Is patient on medication for hyperlipidemia?] : Is patient on medication for hyperlipidemia? Yes [Atorvastatin] : atorvastatin [Is Patient adherent with medication?] : patient is adherent with medication [Patient is interested in seeing a registered dietitian?] : Patient is interested in seeing a registered dietitian? Yes [Self-reports of improved health eating patterns] : Self-reports of improved health eating patterns [Discuss an overview of healthy eating plan] : Discuss an overview of healthy eating plan [Mediterranean Diet] : Mediterranean diet [MyPlate.gov] : MyPlate.gov [Yes, continue with nutrition plan] : Yes, continue with nutrition plan [In progress] : in progress [BP: ____ mmHg] : BP: [unfilled] mmHg [HR: ____ bpm] : BP: [unfilled] bpm [O2sat: ____ %] : O2sat: [unfilled] % [RPE: ____] : RPE: [unfilled]  [METS: ____] : METS: [unfilled]  [Equipment Orientation/Safety] : equipment orientation/safety [Hemodynamic responses] : hemodynamic responses [Home exercise] : home exercise [Patient verbalizes understanding of exercise education all questions answered] : Patient verbalizes understanding of exercise education all questions answered [Return demonstration] : return demonstration [Action] : Stage of change: action [PHQ-9: ___] : PHQ-9: [unfilled] [[facility]: ___] : [facility] total: [unfilled] [Feelings Score: ___] : Feelings Score: [unfilled] [Physical Fitness Score: ____] : Physical Fitness Score: [unfilled] [Daily Activities Score: ___] : Daily Activities Score: [unfilled] [Social Activities Score: ___] : Social Activities Score: [unfilled] [Pain Score: ___] : Pain Score: [unfilled] [Overall Health Score: ___] : Overall Health Score: [unfilled] [Change in Health Score: ___] : Change in Health Score: [unfilled] [Quality of Life Score: ___] : Quality of Life Score: [unfilled] [Social Support Score: ___] : Social Support Score: [unfilled] [Rate your plate score: ____] : Rate your plate score: [unfilled] [Angina with exercise] : no angina with exercise [] : no [FreeTextEntry1] : CT Surgeon: Dr. Torres [FreeTextEntry5] : Dr. Reyes [de-identified] : 4/11/23: Patient presents for in-person session. Patient exercised on treadmill to achieve RPE 13- 4 METS obtained, tolerated well, no complaints.\par Intake: Patient will be participating in virtual cardiac rehab. Cardiac Rehabilitation Phase 2 - Virtual session\par \par  [Does patient see mental health provider?] : Does patient see mental health provider? No [FreeTextEntry6] : none [FreeTextEntry9] : INTAKE: Patient endorses improved emotional well-being [Patient has seen registered dietitian in the past?] : Patient has seen registered dietitian in the past? No [FreeTextEntry8] : INTAKE: Patient has appointment with RD this month.

## 2023-04-19 ENCOUNTER — APPOINTMENT (OUTPATIENT)
Dept: CARDIOLOGY | Facility: CLINIC | Age: 73
End: 2023-04-19
Payer: MEDICARE

## 2023-04-19 LAB
PSA FREE FLD-MCNC: 18 %
PSA FREE SERPL-MCNC: 0.88 NG/ML
PSA SERPL-MCNC: 4.85 NG/ML

## 2023-04-19 PROCEDURE — 93797 PHYS/QHP OP CAR RHAB WO ECG: CPT

## 2023-04-20 ENCOUNTER — NON-APPOINTMENT (OUTPATIENT)
Age: 73
End: 2023-04-20

## 2023-04-20 ENCOUNTER — APPOINTMENT (OUTPATIENT)
Dept: CARDIOLOGY | Facility: CLINIC | Age: 73
End: 2023-04-20
Payer: MEDICARE

## 2023-04-20 PROCEDURE — 93797 PHYS/QHP OP CAR RHAB WO ECG: CPT

## 2023-04-24 ENCOUNTER — APPOINTMENT (OUTPATIENT)
Dept: CARDIOLOGY | Facility: CLINIC | Age: 73
End: 2023-04-24
Payer: MEDICARE

## 2023-04-24 PROCEDURE — 93797 PHYS/QHP OP CAR RHAB WO ECG: CPT

## 2023-04-26 ENCOUNTER — APPOINTMENT (OUTPATIENT)
Dept: CARDIOLOGY | Facility: CLINIC | Age: 73
End: 2023-04-26
Payer: MEDICARE

## 2023-04-26 PROCEDURE — 93797 PHYS/QHP OP CAR RHAB WO ECG: CPT

## 2023-04-27 ENCOUNTER — APPOINTMENT (OUTPATIENT)
Dept: CARDIOLOGY | Facility: CLINIC | Age: 73
End: 2023-04-27
Payer: MEDICARE

## 2023-04-27 PROCEDURE — 93797 PHYS/QHP OP CAR RHAB WO ECG: CPT

## 2023-05-01 ENCOUNTER — APPOINTMENT (OUTPATIENT)
Dept: CARDIOLOGY | Facility: CLINIC | Age: 73
End: 2023-05-01
Payer: MEDICARE

## 2023-05-01 PROCEDURE — 93797 PHYS/QHP OP CAR RHAB WO ECG: CPT

## 2023-05-03 ENCOUNTER — APPOINTMENT (OUTPATIENT)
Dept: CARDIOLOGY | Facility: CLINIC | Age: 73
End: 2023-05-03
Payer: MEDICARE

## 2023-05-03 PROCEDURE — 93797 PHYS/QHP OP CAR RHAB WO ECG: CPT

## 2023-05-04 ENCOUNTER — APPOINTMENT (OUTPATIENT)
Dept: CARDIOLOGY | Facility: CLINIC | Age: 73
End: 2023-05-04
Payer: MEDICARE

## 2023-05-04 PROCEDURE — 93797 PHYS/QHP OP CAR RHAB WO ECG: CPT

## 2023-05-08 ENCOUNTER — APPOINTMENT (OUTPATIENT)
Dept: CARDIOLOGY | Facility: CLINIC | Age: 73
End: 2023-05-08
Payer: MEDICARE

## 2023-05-08 PROCEDURE — 93797 PHYS/QHP OP CAR RHAB WO ECG: CPT

## 2023-05-10 ENCOUNTER — APPOINTMENT (OUTPATIENT)
Dept: CARDIOLOGY | Facility: CLINIC | Age: 73
End: 2023-05-10
Payer: MEDICARE

## 2023-05-10 PROCEDURE — 93797 PHYS/QHP OP CAR RHAB WO ECG: CPT

## 2023-05-11 ENCOUNTER — APPOINTMENT (OUTPATIENT)
Dept: CARDIOLOGY | Facility: CLINIC | Age: 73
End: 2023-05-11
Payer: MEDICARE

## 2023-05-11 ENCOUNTER — NON-APPOINTMENT (OUTPATIENT)
Age: 73
End: 2023-05-11

## 2023-05-11 VITALS
OXYGEN SATURATION: 100 % | TEMPERATURE: 95.3 F | HEIGHT: 70 IN | BODY MASS INDEX: 28.06 KG/M2 | RESPIRATION RATE: 20 BRPM | SYSTOLIC BLOOD PRESSURE: 150 MMHG | WEIGHT: 196 LBS | DIASTOLIC BLOOD PRESSURE: 68 MMHG | HEART RATE: 76 BPM

## 2023-05-11 PROCEDURE — 93000 ELECTROCARDIOGRAM COMPLETE: CPT

## 2023-05-11 PROCEDURE — 99214 OFFICE O/P EST MOD 30 MIN: CPT

## 2023-05-11 PROCEDURE — 93797 PHYS/QHP OP CAR RHAB WO ECG: CPT

## 2023-05-11 RX ORDER — METOPROLOL TARTRATE 25 MG/1
25 TABLET, FILM COATED ORAL EVERY 8 HOURS
Qty: 240 | Refills: 1 | Status: DISCONTINUED | COMMUNITY
Start: 2023-03-20 | End: 2023-05-11

## 2023-05-11 NOTE — DISCUSSION/SUMMARY
[EKG obtained to assist in diagnosis and management of assessed problem(s)] : EKG obtained to assist in diagnosis and management of assessed problem(s) [FreeTextEntry1] : I have asked  Jt  to undergo detailed cardiac testing in order to evaluate her overall cardiovascular risk. An assessment of both structural and functional heart disease was recommended to the patient.\par In this regard, an echocardiogram and a stress test were advised to the patient. I await the upcoming noninvasive cardiac testing in order to assess her overall cardiovascular risk. We discussed the pros and cons of plain treadmill stress testing nuclear stress testing and angiography including a sensitivity analysis.We discussed current ACC guidelines and the calculated 10 year risk is approximately   48% which is severely elevated. More than half of the face to face encounter of 60 minutes   was spent in counseling the patient with respect to  cardiovascular risk. we suspect possible chronic pulmonary disorder given prior right ventricular enlargement on 2018\par Quality measures \par Tobacco intervention not indicated\par Statin for prevention of cardiovascular disease indicated\par Hypertension compensated\par Aspirin for ischemic vascular disease not indicated\par Tobacco screening cessation and intervention not indicated\par \par Medical necessity\par This is a high encounter based upon two or more chronic illnesses with mild exacerbation requiring further management and evaluation.  After careful review of the chart and recommendations after recent pulseless electrical activity.\par \par EKG is indicated for evaluation of shortness of breath\par \par Update 3/31/2023\par A long discussion with Jt with respect to recent open heart surgery a pulmonary evaluation with Dr. Weiss and is suggested given axis deviation and physical findings of rhonchi.  We will continue to withhold spironolactone and furosemide given recent pulseless electrical activity which may have promoted dehydration\par \par 5/11/2023\par Transition to labetalol secondary risk reduction observe for signs and symptoms progressive ambulation blood pressure and cholesterol targets discussed\par \par Medical necessity \par this is a high risk encounter based upon drug evaluation and management\par \par risks benefits alternatives were discussed with the patient and his significant other.\par all questions were answered to their satisfaction.\par

## 2023-05-11 NOTE — REASON FOR VISIT
[CV Risk Factors and Non-Cardiac Disease] : CV risk factors and non-cardiac disease [Coronary Artery Disease] : coronary artery disease [FreeTextEntry3] : Dr. Reyes [FreeTextEntry1] : Jt will be undergoing an MRI of the prostate . He has had some shortness of breath and hypertension. He  is followed by Dr. Henry who has treated him with Pletal cilostazol for peripheral vascular disease. An MRI is planned next week. 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 He  comes today for clarification of  his  overall cardiovascular risk. He was advised to undergo a complete cardiac evaluation. He denies current chest pains shortness of breath or loss of consciousnes.\par \par 3/31/23\par Jt underwent coronary bypass he developed a hemothorax we recorded an evacuation he later had a pulseless electrical activity which required resuscitation and is now doing well postoperatively we discussed secondary risk reduction with his friend at the bedside.  He will undergo urologic evaluations and subspecialty evaluations as indicated.\par \par 5/11/2023\par Elbert sosa today after recent open heart surgery continues to do well off diuretics some incisional pains healing nicely discussed secondary risk reduction including blood pressure management we will consider transition to labetalol from metoprolol given some hypertension noted today discussed with friend at bedside\par \par

## 2023-05-11 NOTE — CARDIOLOGY SUMMARY
[de-identified] : 1/18/23 sinus rhythm rightward axis [de-identified] : 1/25/18 stage 3, 8 minutes [de-identified] : 11/17/18 moderately enlarge right ventricle, mild Pulmonic valve regurgitation ejection fraction 55 to 60%\par 3/9/2023 normal left ventricular function postoperative septal motion [de-identified] : 3/7/2023 open heart surgery Dr. Torres

## 2023-05-16 ENCOUNTER — APPOINTMENT (OUTPATIENT)
Dept: CARDIOLOGY | Facility: CLINIC | Age: 73
End: 2023-05-16
Payer: MEDICARE

## 2023-05-16 LAB
ALBUMIN SERPL ELPH-MCNC: 4.3 G/DL
ALP BLD-CCNC: 156 U/L
ALT SERPL-CCNC: 22 U/L
ANION GAP SERPL CALC-SCNC: 12 MMOL/L
AST SERPL-CCNC: 22 U/L
BASOPHILS # BLD AUTO: 0.04 K/UL
BASOPHILS NFR BLD AUTO: 0.8 %
BILIRUB SERPL-MCNC: 0.5 MG/DL
BUN SERPL-MCNC: 14 MG/DL
CALCIUM SERPL-MCNC: 9.1 MG/DL
CHLORIDE SERPL-SCNC: 106 MMOL/L
CHOLEST SERPL-MCNC: 92 MG/DL
CO2 SERPL-SCNC: 24 MMOL/L
CREAT SERPL-MCNC: 0.95 MG/DL
EGFR: 83 ML/MIN/1.73M2
EOSINOPHIL # BLD AUTO: 0.11 K/UL
EOSINOPHIL NFR BLD AUTO: 2.2 %
ESTIMATED AVERAGE GLUCOSE: 114 MG/DL
GLUCOSE SERPL-MCNC: 125 MG/DL
HBA1C MFR BLD HPLC: 5.6 %
HCT VFR BLD CALC: 38.2 %
HDLC SERPL-MCNC: 35 MG/DL
HGB BLD-MCNC: 12.5 G/DL
IMM GRANULOCYTES NFR BLD AUTO: 0.2 %
LDLC SERPL CALC-MCNC: 40 MG/DL
LYMPHOCYTES # BLD AUTO: 1.48 K/UL
LYMPHOCYTES NFR BLD AUTO: 29 %
MAN DIFF?: NORMAL
MCHC RBC-ENTMCNC: 29.3 PG
MCHC RBC-ENTMCNC: 32.7 GM/DL
MCV RBC AUTO: 89.5 FL
MONOCYTES # BLD AUTO: 0.62 K/UL
MONOCYTES NFR BLD AUTO: 12.1 %
NEUTROPHILS # BLD AUTO: 2.85 K/UL
NEUTROPHILS NFR BLD AUTO: 55.7 %
NONHDLC SERPL-MCNC: 57 MG/DL
PLATELET # BLD AUTO: 244 K/UL
POTASSIUM SERPL-SCNC: 4.3 MMOL/L
PROT SERPL-MCNC: 6.5 G/DL
RBC # BLD: 4.27 M/UL
RBC # FLD: 14.6 %
SODIUM SERPL-SCNC: 142 MMOL/L
TRIGL SERPL-MCNC: 88 MG/DL
TSH SERPL-ACNC: 3.96 UIU/ML
WBC # FLD AUTO: 5.11 K/UL

## 2023-05-16 PROCEDURE — 93797 PHYS/QHP OP CAR RHAB WO ECG: CPT

## 2023-05-16 NOTE — REASON FOR VISIT
[Reassessment: _______] : a reassessment for [unfilled] [FreeTextEntry1] : ITP to be reviewed and manually signed by Dr. Amaya; Clinical indication for cardiac rehabilitation: PCI

## 2023-05-16 NOTE — HISTORY OF PRESENT ILLNESS
[Individualized exercise program as developed at cardiac rehabilitation] : individualized exercise program as developed at cardiac rehabilitation [Overview of diabetes and cardiovascular disease] : overview of diabetes and cardiovascular disease [Diagnosis of prediabetes, diabetes] : diagnosis of prediabetes, diabetes [Hypoglycemia and Hyperglycemia: sign and symptoms] : Hypoglycemia and Hyperglycemia: sign and symptoms [Role of lifestyle behaviors in diabetes management] : role of lifestyle behaviors in diabetes management [Yes, continue with Diabetes plan] : Yes, continue with Diabetes plan [Does patient monitor blood pressure at home?] : patient monitors blood pressure at home [Low sodium diet] : low sodium diet [Other diet strategies] : other diet strategies [Weight management] : weight management [Patient will have a blood pressure < 130/80 mmHg] : patient will have a blood pressure < 130/80 mmHg [Diet changes including (healthy heart eating, less processed foods, low sodium diet)] : Diet changes including (healthy heart eating, less processed foods, low sodium diet) [Healthy weight (management maintenance)] : Healthy weight (management maintenance) [Guidelines for blood pressure management] : guidelines for blood pressure management [Define hypertension] : define hypertension [Role of lifestyle behaviors in blood pressure management] : role of lifestyle behaviors in blood pressure management [Exercise and blood pressure] : exercise and blood pressure [Factors affecting blood pressure] : factors affecting blood pressure [Processed food] : processed food [Yes, continue with Hypertension plan] : Yes, continue with Hypertension plan [Maintenance] : Stage of change: maintenance [Height: ___] : Height: [unfilled] [Recent history of weight gain or weight loss?] : Recent history of weight gain or weight loss? yes [Calories and healthy weight management] : Calories and healthy weight management [Role of lifestyle behaviors in weight management] : Role of lifestyle behaviors in weight management [Yes, continue with Weight Management plan] : Yes, continue with weight management plan [FreeTextEntry3] : P [None] : none [CABG] : CABG [Age] : age [HLD] : HLD [Hypertension] : hypertension [Sedentary] : sedentary [Overweight/Obesity] : overweight/obesity [Angina with exercise] : no angina with exercise [Restrict upper body until ___] : restrict upper body until [unfilled] [BP: ____ mmHg] : BP: [unfilled] mmHg [HR: ____ bpm] : BP: [unfilled] bpm [O2sat: ____ %] : O2sat: [unfilled] % [RPE: ____] : RPE: [unfilled]  [METS: ____] : METS: [unfilled]  [Home-based] : Home-based [___ Days per week] : [unfilled] days per week [>= 31 minutes per session] : >= 31 minutes per session [Target RPE: ___] : Target RPE: [unfilled] [Treadmill] : treadmill [Recumbent bike] : recumbent bike [Upright exercise bike] : upright exercise bike [BioStep] : BioStep [Elliptical] : elliptical [Upper body ergometer] : upper body ergometer [Stair Climber] : stair climber [Assess in ___ weeks] : assess in [unfilled] weeks [Stretching/ROM exercises and balance exercises daily] : Stretching/ROM exercises and balance exercises daily [Will assess for musculoskeletal and other restrictions] : will assess for musculoskeletal and other restrictions [To start resistance training] : to start resistance training [To increase my time spent in physical activity and/or aerobic exercise] : to increase my time spent in physical activity and/or aerobic exercise [Exercise Benefits/Guidelines education] : exercise benefits/guidelines education [Individualized Exercise Rx] : individualized exercise Rx [Signs/Symptoms to report] : signs/symptoms to report [Hemodynamic responses] : hemodynamic responses [Home exercise] : home exercise [Patient verbalizes understanding of exercise education all questions answered] : Patient verbalizes understanding of exercise education all questions answered [Yes, per exercise prescription, policy] : Yes, per exercise prescription, policy [FreeTextEntry1] : CT Surgeon: Dr. Torres [FreeTextEntry5] : Dr. Reyes [de-identified] : Intake: Patient will be participating in virtual cardiac rehab. Cardiac Rehabilitation Phase 2 - Virtual sessions\par Session #1 and #36: in person\par 4/11/23: Patient exercised on treadmill for 32:24 minutes, assessment via incremental progression of speed and grade until RPE 13 achieved; pt tolerated well, denied complaints. \par 5/11/23: Patient moved to virtual cardiac rehab,  tolerating exercise sessions via TEAMS, follows exercise videos shared by exercise physiologist\par 5/16/2023: Patient exercised on treadmill for 10:58 minutes, excluding warm-up and cool-down, with assessment via incremental progression of speed and grade until RPE 13 achieved; pt tolerated well, denied complaints. AT session #1, patient exercised total of 5:16 minutes, excluding warm-up and cool-down to achieve RPE 13. Plan as per patient is to continue to exercise, emulating program here; he goes to [facility]; he will also start light resistance training 2-3 times per week. Sternal incision / healing restrictions discussed with understanding noted. \par  [PHQ-9: ___] : PHQ-9: [unfilled] [[facility]: ___] : [facility] total: [unfilled] [Feelings Score: ___] : Feelings Score: [unfilled] [Physical Fitness Score: ____] : Physical Fitness Score: [unfilled] [Daily Activities Score: ___] : Daily Activities Score: [unfilled] [Social Activities Score: ___] : Social Activities Score: [unfilled] [Pain Score: ___] : Pain Score: [unfilled] [Overall Health Score: ___] : Overall Health Score: [unfilled] [Change in Health Score: ___] : Change in Health Score: [unfilled] [Quality of Life Score: ___] : Quality of Life Score: [unfilled] [Social Support Score: ___] : Social Support Score: [unfilled] [Has the patient given CR team permission to speak with the emergency  about the patient?] : Has the patient given CR team permission to speak with the emergency  about the patient? Yes [Does patient have advance directive?] : Does patient have advance directive? Yes [Does patient see mental health provider?] : Does patient see mental health provider? No [Self-reports of improved psychosocial well-being] : Self-reports of improved psychosocial well-being [Discuss overview of emotional health supportive modalities] : Discuss overview of emotional health supportive modalities [Yes, continue with psychosocial plan] : Yes, continue with psychosocial plan [In progress] : in progress [FreeTextEntry6] : none [FreeTextEntry9] : INTAKE: Patient endorses improved emotional well-being\par DISCHARGE: Patient states he has a lot of support from family; he recognizes that he is depressed at times. Normalcy of experience discussed. Patient provided with [facility] contact list for possible episodic professional counseling. He states he enjoys music and playing the guitar, which he has resumed.  [Action] : Stage of change: Action [Rate your plate score: ____] : Rate your plate score: [unfilled] [Overweight] : Overweight [] : yes [Sugar] : sugar [Sodium] : sodium [Trans fats/saturated fats] : trans fats/saturated fats [Is patient on medication for hyperlipidemia?] : Is patient on medication for hyperlipidemia? Yes [Atorvastatin] : atorvastatin [Is Patient adherent with medication?] : patient is adherent with medication [Patient has seen registered dietitian in the past?] : Patient has seen registered dietitian in the past? No [Patient is interested in seeing a registered dietitian?] : Patient is interested in seeing a registered dietitian? Yes [Self-reports of improved health eating patterns] : Self-reports of improved health eating patterns [Discuss an overview of healthy eating plan] : Discuss an overview of healthy eating plan [MyPlate.gov] : MyPlate.gov [CareNotes written material provided] : CareNotes written material provided [Teach back utilized] : teach back utilized [Discharge instructions as per guidelines] : discharge instructions as per guidelines [Yes, continue with nutrition plan] : Yes, continue with nutrition plan [Met] : met [FreeTextEntry8] : INTAKE: Patient has appointment with SANCHEZ this month.\par DISCHARGE: Patient met with SHAMA Foster RD, which he stated was helpful. He stated he knows what is heart healthy eating, and he is consistently mindful of what to eat, reads food labels. He does the cooking and the shopping.

## 2023-05-17 ENCOUNTER — APPOINTMENT (OUTPATIENT)
Dept: CARDIOLOGY | Facility: CLINIC | Age: 73
End: 2023-05-17

## 2023-05-18 ENCOUNTER — APPOINTMENT (OUTPATIENT)
Dept: CARDIOLOGY | Facility: CLINIC | Age: 73
End: 2023-05-18

## 2023-05-22 ENCOUNTER — APPOINTMENT (OUTPATIENT)
Dept: CARDIOLOGY | Facility: CLINIC | Age: 73
End: 2023-05-22

## 2023-05-24 ENCOUNTER — APPOINTMENT (OUTPATIENT)
Dept: CARDIOLOGY | Facility: CLINIC | Age: 73
End: 2023-05-24

## 2023-05-25 ENCOUNTER — APPOINTMENT (OUTPATIENT)
Dept: CARDIOLOGY | Facility: CLINIC | Age: 73
End: 2023-05-25

## 2023-05-31 ENCOUNTER — APPOINTMENT (OUTPATIENT)
Dept: CARDIOLOGY | Facility: CLINIC | Age: 73
End: 2023-05-31

## 2023-06-01 ENCOUNTER — APPOINTMENT (OUTPATIENT)
Dept: CARDIOLOGY | Facility: CLINIC | Age: 73
End: 2023-06-01

## 2023-06-05 ENCOUNTER — APPOINTMENT (OUTPATIENT)
Dept: CARDIOLOGY | Facility: CLINIC | Age: 73
End: 2023-06-05

## 2023-06-07 ENCOUNTER — APPOINTMENT (OUTPATIENT)
Dept: CARDIOLOGY | Facility: CLINIC | Age: 73
End: 2023-06-07

## 2023-06-08 ENCOUNTER — APPOINTMENT (OUTPATIENT)
Dept: CARDIOLOGY | Facility: CLINIC | Age: 73
End: 2023-06-08

## 2023-06-12 ENCOUNTER — APPOINTMENT (OUTPATIENT)
Dept: CARDIOLOGY | Facility: CLINIC | Age: 73
End: 2023-06-12

## 2023-06-13 RX ORDER — MULTIVIT-MIN/FOLIC/VIT K/LYCOP 400-300MCG
500 TABLET ORAL DAILY
Qty: 30 | Refills: 1 | Status: ACTIVE | COMMUNITY
Start: 2023-03-20 | End: 1900-01-01

## 2023-06-14 ENCOUNTER — APPOINTMENT (OUTPATIENT)
Dept: CARDIOLOGY | Facility: CLINIC | Age: 73
End: 2023-06-14

## 2023-06-15 ENCOUNTER — APPOINTMENT (OUTPATIENT)
Dept: CARDIOLOGY | Facility: CLINIC | Age: 73
End: 2023-06-15

## 2023-06-19 ENCOUNTER — APPOINTMENT (OUTPATIENT)
Dept: CARDIOLOGY | Facility: CLINIC | Age: 73
End: 2023-06-19

## 2023-06-21 ENCOUNTER — APPOINTMENT (OUTPATIENT)
Dept: CARDIOLOGY | Facility: CLINIC | Age: 73
End: 2023-06-21

## 2023-06-22 ENCOUNTER — APPOINTMENT (OUTPATIENT)
Dept: CARDIOLOGY | Facility: CLINIC | Age: 73
End: 2023-06-22

## 2023-06-26 ENCOUNTER — NON-APPOINTMENT (OUTPATIENT)
Age: 73
End: 2023-06-26

## 2023-06-26 ENCOUNTER — APPOINTMENT (OUTPATIENT)
Dept: CARDIOLOGY | Facility: CLINIC | Age: 73
End: 2023-06-26

## 2023-06-28 ENCOUNTER — APPOINTMENT (OUTPATIENT)
Dept: CARDIOLOGY | Facility: CLINIC | Age: 73
End: 2023-06-28

## 2023-06-29 ENCOUNTER — APPOINTMENT (OUTPATIENT)
Dept: CARDIOLOGY | Facility: CLINIC | Age: 73
End: 2023-06-29

## 2023-07-03 ENCOUNTER — APPOINTMENT (OUTPATIENT)
Dept: CARDIOLOGY | Facility: CLINIC | Age: 73
End: 2023-07-03

## 2023-07-13 ENCOUNTER — NON-APPOINTMENT (OUTPATIENT)
Age: 73
End: 2023-07-13

## 2023-07-13 ENCOUNTER — APPOINTMENT (OUTPATIENT)
Dept: CARDIOLOGY | Facility: CLINIC | Age: 73
End: 2023-07-13
Payer: MEDICARE

## 2023-07-13 VITALS
BODY MASS INDEX: 27.2 KG/M2 | RESPIRATION RATE: 17 BRPM | HEIGHT: 70 IN | WEIGHT: 190 LBS | SYSTOLIC BLOOD PRESSURE: 96 MMHG | HEART RATE: 83 BPM | DIASTOLIC BLOOD PRESSURE: 69 MMHG | OXYGEN SATURATION: 97 %

## 2023-07-13 PROCEDURE — 93000 ELECTROCARDIOGRAM COMPLETE: CPT

## 2023-07-13 PROCEDURE — 99212 OFFICE O/P EST SF 10 MIN: CPT

## 2023-07-14 ENCOUNTER — RX RENEWAL (OUTPATIENT)
Age: 73
End: 2023-07-14

## 2023-08-06 NOTE — REASON FOR VISIT
[FreeTextEntry1] : On ramipril and atorvastatin and aspirin no longer on a beta-blocker inquires about the role of antibiotics choose to institute for other dental reasons no history of valve surgery or endocarditis for dental work not indicated from a cardiac standpoint although dentist may no history of valve surgery or endocarditis  7/13/23 cardiac status stable,

## 2023-08-06 NOTE — DISCUSSION/SUMMARY
[FreeTextEntry1] : I have asked  Jt  to undergo detailed cardiac testing in order to evaluate her overall cardiovascular risk. An assessment of both structural and functional heart disease was recommended to the patient. In this regard, an echocardiogram and a stress test were advised to the patient. I await the upcoming noninvasive cardiac testing in order to assess her overall cardiovascular risk. We discussed the pros and cons of plain treadmill stress testing nuclear stress testing and angiography including a sensitivity analysis.We discussed current ACC guidelines and the calculated 10 year risk is approximately   48% which is severely elevated. More than half of the face to face encounter of 60 minutes   was spent in counseling the patient with respect to  cardiovascular risk. we suspect possible chronic pulmonary disorder given prior right ventricular enlargement on 2018 Quality measures  Tobacco intervention not indicated Statin for prevention of cardiovascular disease indicated Hypertension compensated Aspirin for ischemic vascular disease not indicated Tobacco screening cessation and intervention not indicated  Medical necessity This is a high encounter based upon two or more chronic illnesses with mild exacerbation requiring further management and evaluation.  After careful review of the chart and recommendations after recent pulseless electrical activity.  EKG is indicated for evaluation of shortness of breath  Update 3/31/2023 A long discussion with Jt with respect to recent open heart surgery a pulmonary evaluation with Dr. Weiss and is suggested given axis deviation and physical findings of rhonchi.  We will continue to withhold spironolactone and furosemide given recent pulseless electrical activity which may have promoted dehydration  5/11/2023 Transition to labetalol secondary risk reduction observe for signs and symptoms progressive ambulation blood pressure and cholesterol targets discussed  7/13/23 Medications renewed   Medical necessity  this is a Low riskencounter based upon drug evaluation and management  risks benefits alternatives were discussed with the patient and his significant other. all questions were answered to their satisfaction.  [EKG obtained to assist in diagnosis and management of assessed problem(s)] : EKG obtained to assist in diagnosis and management of assessed problem(s)

## 2023-08-06 NOTE — CARDIOLOGY SUMMARY
[de-identified] : 1/18/23 sinus rhythm rightward axis [de-identified] : 1/25/18 stage 3, 8 minutes [de-identified] : 11/17/18 moderately enlarge right ventricle, mild Pulmonic valve regurgitation ejection fraction 55 to 60%\par  3/9/2023 normal left ventricular function postoperative septal motion [de-identified] : 3/7/2023 open heart surgery Dr. Torres

## 2023-08-09 ENCOUNTER — RX RENEWAL (OUTPATIENT)
Age: 73
End: 2023-08-09

## 2023-08-14 ENCOUNTER — RX RENEWAL (OUTPATIENT)
Age: 73
End: 2023-08-14

## 2023-08-14 ENCOUNTER — APPOINTMENT (OUTPATIENT)
Dept: INTERNAL MEDICINE | Facility: CLINIC | Age: 73
End: 2023-08-14
Payer: MEDICARE

## 2023-08-14 VITALS
RESPIRATION RATE: 16 BRPM | HEIGHT: 70 IN | TEMPERATURE: 97.3 F | WEIGHT: 186 LBS | SYSTOLIC BLOOD PRESSURE: 130 MMHG | BODY MASS INDEX: 26.63 KG/M2 | HEART RATE: 98 BPM | DIASTOLIC BLOOD PRESSURE: 66 MMHG | OXYGEN SATURATION: 96 %

## 2023-08-14 PROCEDURE — 99215 OFFICE O/P EST HI 40 MIN: CPT

## 2023-08-14 RX ORDER — MONTELUKAST 10 MG/1
10 TABLET, FILM COATED ORAL DAILY
Qty: 30 | Refills: 5 | Status: DISCONTINUED | COMMUNITY
Start: 2022-11-11 | End: 2023-08-14

## 2023-08-14 RX ORDER — DORZOLAMIDE HYDROCHLORIDE 20 MG/ML
2 SOLUTION OPHTHALMIC TWICE DAILY
Refills: 0 | Status: DISCONTINUED | COMMUNITY
Start: 2023-03-20 | End: 2023-08-14

## 2023-08-14 RX ORDER — UBIDECARENONE 200 MG
500 CAPSULE ORAL
Qty: 30 | Refills: 1 | Status: ACTIVE | COMMUNITY
Start: 2023-08-14 | End: 1900-01-01

## 2023-08-14 NOTE — HISTORY OF PRESENT ILLNESS
[FreeTextEntry1] : ASHD HTN HLD DM BPH Follow up  [de-identified] : ASHD stable no cp sob cardona  HLD DM stable on meds  feels "better than I was" pre CABG X 3V was at [facility] 3 weeks ago

## 2023-10-11 ENCOUNTER — NON-APPOINTMENT (OUTPATIENT)
Age: 73
End: 2023-10-11

## 2023-10-11 ENCOUNTER — APPOINTMENT (OUTPATIENT)
Dept: CARDIOLOGY | Facility: CLINIC | Age: 73
End: 2023-10-11
Payer: MEDICARE

## 2023-10-11 VITALS
HEIGHT: 70 IN | DIASTOLIC BLOOD PRESSURE: 65 MMHG | WEIGHT: 192 LBS | HEART RATE: 87 BPM | BODY MASS INDEX: 27.49 KG/M2 | SYSTOLIC BLOOD PRESSURE: 122 MMHG | OXYGEN SATURATION: 99 % | RESPIRATION RATE: 17 BRPM

## 2023-10-11 DIAGNOSIS — J90 PLEURAL EFFUSION, NOT ELSEWHERE CLASSIFIED: ICD-10-CM

## 2023-10-11 PROCEDURE — 93000 ELECTROCARDIOGRAM COMPLETE: CPT

## 2023-10-11 PROCEDURE — 99213 OFFICE O/P EST LOW 20 MIN: CPT

## 2023-10-13 ENCOUNTER — RX RENEWAL (OUTPATIENT)
Age: 73
End: 2023-10-13

## 2023-10-24 ENCOUNTER — APPOINTMENT (OUTPATIENT)
Dept: UROLOGY | Facility: CLINIC | Age: 73
End: 2023-10-24
Payer: MEDICARE

## 2023-10-24 VITALS
BODY MASS INDEX: 27.49 KG/M2 | TEMPERATURE: 97.8 F | OXYGEN SATURATION: 98 % | HEIGHT: 70 IN | HEART RATE: 81 BPM | WEIGHT: 192 LBS | DIASTOLIC BLOOD PRESSURE: 70 MMHG | SYSTOLIC BLOOD PRESSURE: 130 MMHG

## 2023-10-24 PROCEDURE — 51702 INSERT TEMP BLADDER CATH: CPT | Mod: 59

## 2023-10-24 PROCEDURE — 51798 US URINE CAPACITY MEASURE: CPT

## 2023-10-24 PROCEDURE — 51741 ELECTRO-UROFLOWMETRY FIRST: CPT

## 2023-10-31 ENCOUNTER — APPOINTMENT (OUTPATIENT)
Dept: UROLOGY | Facility: CLINIC | Age: 73
End: 2023-10-31
Payer: MEDICARE

## 2023-10-31 VITALS
WEIGHT: 192 LBS | HEART RATE: 80 BPM | OXYGEN SATURATION: 98 % | HEIGHT: 70 IN | DIASTOLIC BLOOD PRESSURE: 68 MMHG | BODY MASS INDEX: 27.49 KG/M2 | TEMPERATURE: 97.8 F | SYSTOLIC BLOOD PRESSURE: 128 MMHG

## 2023-10-31 DIAGNOSIS — R33.9 RETENTION OF URINE, UNSPECIFIED: ICD-10-CM

## 2023-10-31 LAB
ALBUMIN SERPL ELPH-MCNC: 4.7 G/DL
ALP BLD-CCNC: 134 U/L
ALT SERPL-CCNC: 25 U/L
ANION GAP SERPL CALC-SCNC: 12 MMOL/L
AST SERPL-CCNC: 31 U/L
BILIRUB SERPL-MCNC: 0.4 MG/DL
BUN SERPL-MCNC: 21 MG/DL
CALCIUM SERPL-MCNC: 8.9 MG/DL
CHLORIDE SERPL-SCNC: 105 MMOL/L
CO2 SERPL-SCNC: 22 MMOL/L
CREAT SERPL-MCNC: 0.97 MG/DL
EGFR: 81 ML/MIN/1.73M2
GLUCOSE SERPL-MCNC: 91 MG/DL
POTASSIUM SERPL-SCNC: 4.4 MMOL/L
PROT SERPL-MCNC: 7.1 G/DL
PSA FREE FLD-MCNC: 18 %
PSA FREE SERPL-MCNC: 1.01 NG/ML
PSA SERPL-MCNC: 5.75 NG/ML
SODIUM SERPL-SCNC: 140 MMOL/L

## 2023-10-31 PROCEDURE — 51700 IRRIGATION OF BLADDER: CPT

## 2023-10-31 PROCEDURE — A4216: CPT | Mod: NC

## 2023-10-31 PROCEDURE — 99213 OFFICE O/P EST LOW 20 MIN: CPT | Mod: 25

## 2023-10-31 PROCEDURE — 51798 US URINE CAPACITY MEASURE: CPT

## 2023-11-01 LAB
ALBUMIN SERPL ELPH-MCNC: 4.5 G/DL
ALP BLD-CCNC: 125 U/L
ALT SERPL-CCNC: 18 U/L
ANION GAP SERPL CALC-SCNC: 11 MMOL/L
APPEARANCE: CLEAR
APTT BLD: 29.6 SEC
AST SERPL-CCNC: 20 U/L
BACTERIA: ABNORMAL /HPF
BASOPHILS # BLD AUTO: 0.05 K/UL
BASOPHILS NFR BLD AUTO: 0.5 %
BILIRUB SERPL-MCNC: 0.8 MG/DL
BILIRUBIN URINE: NEGATIVE
BLOOD URINE: ABNORMAL
BUN SERPL-MCNC: 15 MG/DL
CALCIUM SERPL-MCNC: 9.2 MG/DL
CAST: 3 /LPF
CHLORIDE SERPL-SCNC: 102 MMOL/L
CO2 SERPL-SCNC: 28 MMOL/L
COLOR: YELLOW
CREAT SERPL-MCNC: 1.02 MG/DL
EGFR: 77 ML/MIN/1.73M2
EOSINOPHIL # BLD AUTO: 0.07 K/UL
EOSINOPHIL NFR BLD AUTO: 0.7 %
EPITHELIAL CELLS: 0 /HPF
ESTIMATED AVERAGE GLUCOSE: 123 MG/DL
GLUCOSE QUALITATIVE U: NEGATIVE MG/DL
GLUCOSE SERPL-MCNC: 91 MG/DL
HBA1C MFR BLD HPLC: 5.9 %
HCT VFR BLD CALC: 41.8 %
HGB BLD-MCNC: 13.9 G/DL
IMM GRANULOCYTES NFR BLD AUTO: 0.3 %
INR PPP: 0.99 RATIO
KETONES URINE: NEGATIVE MG/DL
LEUKOCYTE ESTERASE URINE: ABNORMAL
LYMPHOCYTES # BLD AUTO: 1.62 K/UL
LYMPHOCYTES NFR BLD AUTO: 15.5 %
MAN DIFF?: NORMAL
MCHC RBC-ENTMCNC: 30 PG
MCHC RBC-ENTMCNC: 33.3 GM/DL
MCV RBC AUTO: 90.3 FL
MICROSCOPIC-UA: NORMAL
MONOCYTES # BLD AUTO: 1.26 K/UL
MONOCYTES NFR BLD AUTO: 12 %
NEUTROPHILS # BLD AUTO: 7.45 K/UL
NEUTROPHILS NFR BLD AUTO: 71 %
NITRITE URINE: POSITIVE
PH URINE: 6
PLATELET # BLD AUTO: 206 K/UL
POTASSIUM SERPL-SCNC: 4.2 MMOL/L
PROT SERPL-MCNC: 6.7 G/DL
PROTEIN URINE: NORMAL MG/DL
PT BLD: 11.3 SEC
RBC # BLD: 4.63 M/UL
RBC # FLD: 13.4 %
RED BLOOD CELLS URINE: 52 /HPF
REVIEW: NORMAL
SODIUM SERPL-SCNC: 140 MMOL/L
SPECIFIC GRAVITY URINE: 1.01
UROBILINOGEN URINE: 0.2 MG/DL
WBC # FLD AUTO: 10.48 K/UL
WHITE BLOOD CELLS URINE: 150 /HPF

## 2023-11-09 LAB — BACTERIA UR CULT: ABNORMAL

## 2023-11-16 ENCOUNTER — APPOINTMENT (OUTPATIENT)
Dept: INTERNAL MEDICINE | Facility: CLINIC | Age: 73
End: 2023-11-16
Payer: MEDICARE

## 2023-11-16 VITALS
DIASTOLIC BLOOD PRESSURE: 70 MMHG | TEMPERATURE: 97.6 F | WEIGHT: 200 LBS | HEIGHT: 70 IN | OXYGEN SATURATION: 96 % | BODY MASS INDEX: 28.63 KG/M2 | SYSTOLIC BLOOD PRESSURE: 118 MMHG | RESPIRATION RATE: 16 BRPM | HEART RATE: 84 BPM

## 2023-11-16 VITALS
TEMPERATURE: 98 F | RESPIRATION RATE: 16 BRPM | BODY MASS INDEX: 26.77 KG/M2 | HEIGHT: 70 IN | DIASTOLIC BLOOD PRESSURE: 70 MMHG | SYSTOLIC BLOOD PRESSURE: 122 MMHG | OXYGEN SATURATION: 97 % | WEIGHT: 187 LBS | HEART RATE: 80 BPM

## 2023-11-16 DIAGNOSIS — Z95.1 PRESENCE OF AORTOCORONARY BYPASS GRAFT: ICD-10-CM

## 2023-11-16 DIAGNOSIS — Z01.818 ENCOUNTER FOR OTHER PREPROCEDURAL EXAMINATION: ICD-10-CM

## 2023-11-16 PROCEDURE — 99214 OFFICE O/P EST MOD 30 MIN: CPT

## 2023-11-16 RX ORDER — CILOSTAZOL 100 MG/1
100 TABLET ORAL
Refills: 0 | Status: ACTIVE | COMMUNITY
Start: 2023-11-16

## 2023-11-21 ENCOUNTER — APPOINTMENT (OUTPATIENT)
Dept: CARDIOLOGY | Facility: CLINIC | Age: 73
End: 2023-11-21
Payer: MEDICARE

## 2023-11-21 ENCOUNTER — NON-APPOINTMENT (OUTPATIENT)
Age: 73
End: 2023-11-21

## 2023-11-21 VITALS
HEIGHT: 70 IN | DIASTOLIC BLOOD PRESSURE: 85 MMHG | SYSTOLIC BLOOD PRESSURE: 143 MMHG | BODY MASS INDEX: 27.49 KG/M2 | OXYGEN SATURATION: 97 % | HEART RATE: 71 BPM | WEIGHT: 192 LBS

## 2023-11-21 PROCEDURE — 99214 OFFICE O/P EST MOD 30 MIN: CPT

## 2023-11-21 PROCEDURE — 93000 ELECTROCARDIOGRAM COMPLETE: CPT | Mod: NC

## 2023-11-24 NOTE — ASSESSMENT
Neurologic Chief Complaint: R MCA stroke    Subjective:     Interval History: Patient is seen for follow-up neurological assessment and treatment recommendations:   NAEO, remains intubated/sedated. EEG with severe slowing but no evidence of seizures. MRI brain confirms large R MCA infarcts, echo with EF 60% and apical akinesis. Infectious workup ongoing.    HPI, Past Medical, Family, and Social History remains the same as documented in the initial encounter.     Review of Systems   Unable to perform ROS: Intubated     Scheduled Meds:   atorvastatin  40 mg Oral Daily    famotidine  20 mg Per OG tube Daily    levETIRAcetam (Keppra) IV (PEDS and ADULTS)  750 mg Intravenous Q12H    piperacillin-tazobactam (Zosyn) IV (PEDS and ADULTS) (extended infusion is not appropriate)  4.5 g Intravenous Q8H    senna-docusate 8.6-50 mg  1 tablet Per OG tube Daily     Continuous Infusions:   fentanyl 75 mcg/hr (11/24/23 1101)     PRN Meds:acetaminophen, dextrose 10%, dextrose 10%, glucagon (human recombinant), insulin aspart U-100, ondansetron, sodium chloride 0.9%, sodium chloride 0.9%, Pharmacy to dose Vancomycin consult **AND** vancomycin - pharmacy to dose    Objective:     Vital Signs (Most Recent):  Temp: 98.9 °F (37.2 °C) (11/24/23 1101)  Pulse: (!) 56 (11/24/23 1131)  Resp: 14 (11/24/23 1131)  BP: (!) 155/67 (11/24/23 1101)  SpO2: 96 % (11/24/23 1131)  BP Location: Left arm    Vital Signs Range (Last 24H):  Temp:  [98.3 °F (36.8 °C)-99.7 °F (37.6 °C)]   Pulse:  []   Resp:  [13-39]   BP: ()/(47-78)   SpO2:  [57 %-100 %]   BP Location: Left arm       Physical Exam  Vitals reviewed.   Constitutional:       General: He is not in acute distress.     Interventions: He is intubated.   HENT:      Head: Normocephalic and atraumatic.   Cardiovascular:      Rate and Rhythm: Normal rate.   Pulmonary:      Effort: No respiratory distress. He is intubated.   Skin:     General: Skin is warm.          Neurological Exam:   LOC:  [FreeTextEntry1] : Patient is a 72 year old man, past medical h/o HLD, HTN, pre-diabetes, PAD, BPH, bilateral total hip replacements, eye surgeries for cataracts and detached retina; s/p CABG x3 on 3/6/23, with post-op course notable for anemia/transfusions, hypovolemia, cardiogenic shock/arrest,followed by PAUL in 2 minutes with CPR and return to OR for bleeding on 3/7/23. Patient subsequently did well, was discharged on 3/14/23 to [facility]. \par \par Patient currently stable/appropriate referral for virtual cardiac rehab "obtunded  Attention Span: poor  Language: Intubated, not following commands  Articulation: Untestable due to intubation  Orientation: Untestable due to intubation  Motor: extensor posturing in LUE, TF in LLE, moves R side spontaneously and withdraws to painful stimuli  Sensation: Iván-anesthesia left  Tone: Flaccid  LUE       Laboratory:  CMP:   Recent Labs   Lab 11/24/23 0345   CALCIUM 8.6*   ALBUMIN 3.0*   PROT 6.7   *   K 4.1   CO2 18*   *   BUN 26*   CREATININE 1.2   ALKPHOS 74   ALT 37   *   BILITOT 0.8     CBC:   Recent Labs   Lab 11/24/23 0345   WBC 17.54*   RBC 4.94   HGB 13.9*   HCT 42.3      MCV 86   MCH 28.1   MCHC 32.9     Lipid Panel:   Recent Labs   Lab 11/23/23 1915   CHOL 94*   LDLCALC 48.4*   HDL 32*   TRIG 68     Coagulation: No results for input(s): "PT", "INR", "APTT" in the last 168 hours.  Hgb A1C:   Recent Labs   Lab 11/23/23 1915   HGBA1C 6.6*     TSH:   Recent Labs   Lab 11/23/23 1915   TSH 2.121       Diagnostic Results     Brain imaging:  MRI brain without contrast 11/24/2023  Impression:  Large right MCA territorial infarct without hemorrhagic conversion.  Currently very mild mass effect.  Loss of the right carotid flow void consistent with occlusion versus slow flow.     CT Head 11/23/23 1812  Impression:  Acute right MCA territory infarction, similar to prior.  No infarct extension or hemorrhagic conversion.  Associated hyperdense vessel sign likely involving the right M1 and a proximal M2 branch.  Changes of generalized cerebral volume loss.  Paranasal sinus disease as above.     CT Head 11/23/23 1416  FINDINGS:  Decreased attenuation in sulcal effacement right MCA distribution consistent with acute in Ca infarct.  Hyperdense right MCA sign consistent with MCA thrombus.  No hydrocephalus.  No signs of acute hemorrhage.  Moderate underlying volume loss.  Right maxillary mucous retention cyst.  Impression:  Acute right MCA infarct.       Vessel Imaging:  IR " cerebral angiogram 11/23/2023  Preliminary interpretation: occlusion of R ICA at origin, unable to cross distally to perform angioplasty and stenting. Attempted thrombectomy at origin, tici 0.  Please see Imaging report for full details.        Cardiac Evaluation:   TTE 11/24/2023    Left Ventricle: The left ventricle is normal in size. Ventricular mass is normal. Normal wall thickness. There is concentric remodeling. Regional wall motion abnormalities present - see diagram for wall motion findings. There is normal systolic function. Ejection fraction by visual approximation is 60%. There is normal diastolic function.    Right Ventricle: Normal right ventricular cavity size. Wall thickness is normal. Right ventricle wall motion  is normal. Systolic function is normal.    Aortic Valve: The aortic valve is a trileaflet valve. There is mild aortic valve sclerosis. There is annular calcification present. There is mild aortic regurgitation.    IVC/SVC: Patient is ventilated, cannot use IVC diameter to estimate right atrial pressure. PASP is at least 24mmHg.

## 2023-11-29 ENCOUNTER — RESULT REVIEW (OUTPATIENT)
Age: 73
End: 2023-11-29

## 2023-11-29 ENCOUNTER — OUTPATIENT (OUTPATIENT)
Dept: OUTPATIENT SERVICES | Facility: HOSPITAL | Age: 73
LOS: 1 days | End: 2023-11-29
Payer: MEDICARE

## 2023-11-29 VITALS
WEIGHT: 190.92 LBS | SYSTOLIC BLOOD PRESSURE: 148 MMHG | DIASTOLIC BLOOD PRESSURE: 77 MMHG | OXYGEN SATURATION: 97 % | HEIGHT: 70 IN | HEART RATE: 66 BPM | TEMPERATURE: 97 F | RESPIRATION RATE: 16 BRPM

## 2023-11-29 DIAGNOSIS — N40.1 BENIGN PROSTATIC HYPERPLASIA WITH LOWER URINARY TRACT SYMPTOMS: ICD-10-CM

## 2023-11-29 DIAGNOSIS — Z95.1 PRESENCE OF AORTOCORONARY BYPASS GRAFT: Chronic | ICD-10-CM

## 2023-11-29 DIAGNOSIS — H33.053 TOTAL RETINAL DETACHMENT, BILATERAL: Chronic | ICD-10-CM

## 2023-11-29 DIAGNOSIS — I25.10 ATHEROSCLEROTIC HEART DISEASE OF NATIVE CORONARY ARTERY WITHOUT ANGINA PECTORIS: ICD-10-CM

## 2023-11-29 DIAGNOSIS — Z96.642 PRESENCE OF LEFT ARTIFICIAL HIP JOINT: Chronic | ICD-10-CM

## 2023-11-29 DIAGNOSIS — I10 ESSENTIAL (PRIMARY) HYPERTENSION: ICD-10-CM

## 2023-11-29 DIAGNOSIS — Z01.818 ENCOUNTER FOR OTHER PREPROCEDURAL EXAMINATION: ICD-10-CM

## 2023-11-29 DIAGNOSIS — Z96.641 PRESENCE OF RIGHT ARTIFICIAL HIP JOINT: Chronic | ICD-10-CM

## 2023-11-29 DIAGNOSIS — Z90.49 ACQUIRED ABSENCE OF OTHER SPECIFIED PARTS OF DIGESTIVE TRACT: Chronic | ICD-10-CM

## 2023-11-29 DIAGNOSIS — N13.8 OTHER OBSTRUCTIVE AND REFLUX UROPATHY: ICD-10-CM

## 2023-11-29 DIAGNOSIS — E11.9 TYPE 2 DIABETES MELLITUS WITHOUT COMPLICATIONS: ICD-10-CM

## 2023-11-29 PROCEDURE — G0463: CPT

## 2023-11-29 PROCEDURE — 71046 X-RAY EXAM CHEST 2 VIEWS: CPT | Mod: 26

## 2023-11-29 PROCEDURE — 71046 X-RAY EXAM CHEST 2 VIEWS: CPT

## 2023-11-29 PROCEDURE — 36415 COLL VENOUS BLD VENIPUNCTURE: CPT

## 2023-11-29 RX ORDER — MONTELUKAST 4 MG/1
1 TABLET, CHEWABLE ORAL
Qty: 0 | Refills: 0 | DISCHARGE

## 2023-11-29 RX ORDER — POTASSIUM CHLORIDE 20 MEQ
1 PACKET (EA) ORAL
Qty: 0 | Refills: 0 | DISCHARGE

## 2023-11-29 RX ORDER — ATORVASTATIN CALCIUM 80 MG/1
1 TABLET, FILM COATED ORAL
Qty: 0 | Refills: 0 | DISCHARGE

## 2023-11-29 RX ORDER — EMPAGLIFLOZIN 10 MG/1
1 TABLET, FILM COATED ORAL
Refills: 0 | DISCHARGE

## 2023-11-29 RX ORDER — SODIUM CHLORIDE 9 MG/ML
1000 INJECTION, SOLUTION INTRAVENOUS
Refills: 0 | Status: DISCONTINUED | OUTPATIENT
Start: 2023-12-13 | End: 2023-12-27

## 2023-11-29 NOTE — H&P PST ADULT - NSICDXPASTMEDICALHX_GEN_ALL_CORE_FT
PAST MEDICAL HISTORY:  Arteriosclerotic cardiovascular disease (ASCVD)     DM (diabetes mellitus)     Enlarged prostate with lower urinary tract symptoms (LUTS)     Glaucoma     Hip pain, right     Hypertension     Macular degeneration of both eyes     Retinal detachments and defects bilateral

## 2023-11-29 NOTE — H&P PST ADULT - HISTORY OF PRESENT ILLNESS
This is a 72 y/o male with PMHX of DM 2, ASCVD (CABGx3 in 2023), HTN, HLD who presents to PST with pre-operative diagnosis of enlarged prostate with LUTS.  Pt reports h/o incomplete emptying of bladder, and nocturia. Pt has a casiano catheter in place.  Today he denies gross hematuria.  Pt feels well and denies any acute symptoms.

## 2023-11-29 NOTE — H&P PST ADULT - NSICDXFAMILYHX_GEN_ALL_CORE_FT
FAMILY HISTORY:  Father  Still living? No  FH: pancreatic cancer, Age at diagnosis: Age Unknown    Mother  Still living? No  Family history of breast cancer in mother, Age at diagnosis: Age Unknown

## 2023-11-29 NOTE — H&P PST ADULT - PROBLEM SELECTOR PLAN 1
Patient provided with pre-operative instructions and verbalized understanding.  Patient will be NPO on day of surgery. Patient will stop NSAIDs, aspirin, herbal supplements or vitamins 1 week prior to surgery.      Chest xray ordered today due to h/o pleural effusion.  Pt's cardiologist requesting repeat CXR for cardiac clearance

## 2023-11-29 NOTE — H&P PST ADULT - NSICDXPASTSURGICALHX_GEN_ALL_CORE_FT
PAST SURGICAL HISTORY:  H/O total hip arthroplasty, right Right hip replacement 2010    Recent retinal detachment, total, bilateral 1989 (left), 2006 (right)    S/P appendectomy     S/P CABG x 3     S/P hip replacement, left 2006

## 2023-12-12 ENCOUNTER — TRANSCRIPTION ENCOUNTER (OUTPATIENT)
Age: 73
End: 2023-12-12

## 2023-12-13 ENCOUNTER — TRANSCRIPTION ENCOUNTER (OUTPATIENT)
Age: 73
End: 2023-12-13

## 2023-12-13 ENCOUNTER — OUTPATIENT (OUTPATIENT)
Dept: OUTPATIENT SERVICES | Facility: HOSPITAL | Age: 73
LOS: 1 days | End: 2023-12-13
Payer: MEDICARE

## 2023-12-13 ENCOUNTER — RESULT REVIEW (OUTPATIENT)
Age: 73
End: 2023-12-13

## 2023-12-13 ENCOUNTER — APPOINTMENT (OUTPATIENT)
Dept: UROLOGY | Facility: HOSPITAL | Age: 73
End: 2023-12-13

## 2023-12-13 VITALS
HEART RATE: 80 BPM | DIASTOLIC BLOOD PRESSURE: 85 MMHG | TEMPERATURE: 98 F | RESPIRATION RATE: 14 BRPM | SYSTOLIC BLOOD PRESSURE: 160 MMHG | HEIGHT: 70 IN | WEIGHT: 190.92 LBS | OXYGEN SATURATION: 97 %

## 2023-12-13 DIAGNOSIS — N13.8 OTHER OBSTRUCTIVE AND REFLUX UROPATHY: ICD-10-CM

## 2023-12-13 DIAGNOSIS — H33.053 TOTAL RETINAL DETACHMENT, BILATERAL: Chronic | ICD-10-CM

## 2023-12-13 DIAGNOSIS — Z96.642 PRESENCE OF LEFT ARTIFICIAL HIP JOINT: Chronic | ICD-10-CM

## 2023-12-13 DIAGNOSIS — Z96.641 PRESENCE OF RIGHT ARTIFICIAL HIP JOINT: Chronic | ICD-10-CM

## 2023-12-13 DIAGNOSIS — N40.1 BENIGN PROSTATIC HYPERPLASIA WITH LOWER URINARY TRACT SYMPTOMS: ICD-10-CM

## 2023-12-13 DIAGNOSIS — Z95.1 PRESENCE OF AORTOCORONARY BYPASS GRAFT: Chronic | ICD-10-CM

## 2023-12-13 DIAGNOSIS — Z90.49 ACQUIRED ABSENCE OF OTHER SPECIFIED PARTS OF DIGESTIVE TRACT: Chronic | ICD-10-CM

## 2023-12-13 LAB
ANION GAP SERPL CALC-SCNC: 7 MMOL/L — SIGNIFICANT CHANGE UP (ref 5–17)
BUN SERPL-MCNC: 19 MG/DL — SIGNIFICANT CHANGE UP (ref 7–23)
CALCIUM SERPL-MCNC: 8.2 MG/DL — LOW (ref 8.4–10.5)
CHLORIDE SERPL-SCNC: 106 MMOL/L — SIGNIFICANT CHANGE UP (ref 96–108)
CO2 SERPL-SCNC: 23 MMOL/L — SIGNIFICANT CHANGE UP (ref 22–31)
CREAT SERPL-MCNC: 0.91 MG/DL — SIGNIFICANT CHANGE UP (ref 0.5–1.3)
EGFR: 88 ML/MIN/1.73M2 — SIGNIFICANT CHANGE UP
GLUCOSE BLDC GLUCOMTR-MCNC: 104 MG/DL — HIGH (ref 70–99)
GLUCOSE BLDC GLUCOMTR-MCNC: 104 MG/DL — HIGH (ref 70–99)
GLUCOSE BLDC GLUCOMTR-MCNC: 129 MG/DL — HIGH (ref 70–99)
GLUCOSE BLDC GLUCOMTR-MCNC: 192 MG/DL — HIGH (ref 70–99)
GLUCOSE BLDC GLUCOMTR-MCNC: 192 MG/DL — HIGH (ref 70–99)
GLUCOSE SERPL-MCNC: 119 MG/DL — HIGH (ref 70–99)
HCT VFR BLD CALC: 38.6 % — LOW (ref 39–50)
HGB BLD-MCNC: 13.4 G/DL — SIGNIFICANT CHANGE UP (ref 13–17)
MCHC RBC-ENTMCNC: 30.1 PG — SIGNIFICANT CHANGE UP (ref 27–34)
MCHC RBC-ENTMCNC: 34.7 GM/DL — SIGNIFICANT CHANGE UP (ref 32–36)
MCV RBC AUTO: 86.7 FL — SIGNIFICANT CHANGE UP (ref 80–100)
NRBC # BLD: 0 /100 WBCS — SIGNIFICANT CHANGE UP (ref 0–0)
PLATELET # BLD AUTO: 198 K/UL — SIGNIFICANT CHANGE UP (ref 150–400)
POTASSIUM SERPL-MCNC: 4.8 MMOL/L — SIGNIFICANT CHANGE UP (ref 3.5–5.3)
POTASSIUM SERPL-SCNC: 4.8 MMOL/L — SIGNIFICANT CHANGE UP (ref 3.5–5.3)
RBC # BLD: 4.45 M/UL — SIGNIFICANT CHANGE UP (ref 4.2–5.8)
RBC # FLD: 12.5 % — SIGNIFICANT CHANGE UP (ref 10.3–14.5)
SODIUM SERPL-SCNC: 136 MMOL/L — SIGNIFICANT CHANGE UP (ref 135–145)
WBC # BLD: 6.41 K/UL — SIGNIFICANT CHANGE UP (ref 3.8–10.5)
WBC # FLD AUTO: 6.41 K/UL — SIGNIFICANT CHANGE UP (ref 3.8–10.5)

## 2023-12-13 PROCEDURE — 52601 PROSTATECTOMY (TURP): CPT

## 2023-12-13 PROCEDURE — 88305 TISSUE EXAM BY PATHOLOGIST: CPT | Mod: 26

## 2023-12-13 PROCEDURE — ZZZZZ: CPT

## 2023-12-13 RX ORDER — ONDANSETRON 8 MG/1
4 TABLET, FILM COATED ORAL ONCE
Refills: 0 | Status: DISCONTINUED | OUTPATIENT
Start: 2023-12-13 | End: 2023-12-13

## 2023-12-13 RX ORDER — LATANOPROST 0.05 MG/ML
1 SOLUTION/ DROPS OPHTHALMIC; TOPICAL
Refills: 0 | DISCHARGE

## 2023-12-13 RX ORDER — LABETALOL HCL 100 MG
100 TABLET ORAL
Refills: 0 | Status: DISCONTINUED | OUTPATIENT
Start: 2023-12-13 | End: 2023-12-27

## 2023-12-13 RX ORDER — DORZOLAMIDE HYDROCHLORIDE 20 MG/ML
1 SOLUTION/ DROPS OPHTHALMIC
Refills: 0 | Status: DISCONTINUED | OUTPATIENT
Start: 2023-12-13 | End: 2023-12-27

## 2023-12-13 RX ORDER — FENTANYL CITRATE 50 UG/ML
25 INJECTION INTRAVENOUS
Refills: 0 | Status: DISCONTINUED | OUTPATIENT
Start: 2023-12-13 | End: 2023-12-13

## 2023-12-13 RX ORDER — DEXTROSE 50 % IN WATER 50 %
15 SYRINGE (ML) INTRAVENOUS ONCE
Refills: 0 | Status: DISCONTINUED | OUTPATIENT
Start: 2023-12-13 | End: 2023-12-27

## 2023-12-13 RX ORDER — LIDOCAINE HCL 20 MG/ML
5 VIAL (ML) INJECTION EVERY 4 HOURS
Refills: 0 | Status: DISCONTINUED | OUTPATIENT
Start: 2023-12-13 | End: 2023-12-27

## 2023-12-13 RX ORDER — CILOSTAZOL 100 MG/1
100 TABLET ORAL
Refills: 0 | Status: DISCONTINUED | OUTPATIENT
Start: 2023-12-13 | End: 2023-12-27

## 2023-12-13 RX ORDER — TAMSULOSIN HYDROCHLORIDE 0.4 MG/1
1 CAPSULE ORAL
Qty: 0 | Refills: 0 | DISCHARGE

## 2023-12-13 RX ORDER — METFORMIN HYDROCHLORIDE 850 MG/1
1 TABLET ORAL
Refills: 0 | DISCHARGE

## 2023-12-13 RX ORDER — INSULIN LISPRO 100/ML
VIAL (ML) SUBCUTANEOUS AT BEDTIME
Refills: 0 | Status: DISCONTINUED | OUTPATIENT
Start: 2023-12-13 | End: 2023-12-27

## 2023-12-13 RX ORDER — OXYCODONE AND ACETAMINOPHEN 5; 325 MG/1; MG/1
1 TABLET ORAL EVERY 4 HOURS
Refills: 0 | Status: DISCONTINUED | OUTPATIENT
Start: 2023-12-13 | End: 2023-12-13

## 2023-12-13 RX ORDER — LATANOPROST 0.05 MG/ML
1 SOLUTION/ DROPS OPHTHALMIC; TOPICAL AT BEDTIME
Refills: 0 | Status: DISCONTINUED | OUTPATIENT
Start: 2023-12-13 | End: 2023-12-27

## 2023-12-13 RX ORDER — DORZOLAMIDE HYDROCHLORIDE 20 MG/ML
1 SOLUTION/ DROPS OPHTHALMIC
Refills: 0 | DISCHARGE

## 2023-12-13 RX ORDER — SODIUM CHLORIDE 9 MG/ML
1000 INJECTION, SOLUTION INTRAVENOUS
Refills: 0 | Status: DISCONTINUED | OUTPATIENT
Start: 2023-12-13 | End: 2023-12-27

## 2023-12-13 RX ORDER — SODIUM CHLORIDE 9 MG/ML
1000 INJECTION, SOLUTION INTRAVENOUS
Refills: 0 | Status: DISCONTINUED | OUTPATIENT
Start: 2023-12-13 | End: 2023-12-13

## 2023-12-13 RX ORDER — LISINOPRIL 2.5 MG/1
10 TABLET ORAL DAILY
Refills: 0 | Status: DISCONTINUED | OUTPATIENT
Start: 2023-12-13 | End: 2023-12-27

## 2023-12-13 RX ORDER — CEFTRIAXONE 500 MG/1
1000 INJECTION, POWDER, FOR SOLUTION INTRAMUSCULAR; INTRAVENOUS EVERY 24 HOURS
Refills: 0 | Status: DISCONTINUED | OUTPATIENT
Start: 2023-12-13 | End: 2023-12-27

## 2023-12-13 RX ORDER — GLUCAGON INJECTION, SOLUTION 0.5 MG/.1ML
1 INJECTION, SOLUTION SUBCUTANEOUS ONCE
Refills: 0 | Status: DISCONTINUED | OUTPATIENT
Start: 2023-12-13 | End: 2023-12-27

## 2023-12-13 RX ORDER — INSULIN LISPRO 100/ML
VIAL (ML) SUBCUTANEOUS
Refills: 0 | Status: DISCONTINUED | OUTPATIENT
Start: 2023-12-13 | End: 2023-12-27

## 2023-12-13 RX ORDER — LABETALOL HCL 100 MG
1 TABLET ORAL
Refills: 0 | DISCHARGE

## 2023-12-13 RX ORDER — RAMIPRIL 5 MG
0 CAPSULE ORAL
Refills: 0 | DISCHARGE

## 2023-12-13 RX ORDER — ONDANSETRON 8 MG/1
4 TABLET, FILM COATED ORAL EVERY 6 HOURS
Refills: 0 | Status: DISCONTINUED | OUTPATIENT
Start: 2023-12-13 | End: 2023-12-27

## 2023-12-13 RX ORDER — DEXTROSE 50 % IN WATER 50 %
25 SYRINGE (ML) INTRAVENOUS ONCE
Refills: 0 | Status: DISCONTINUED | OUTPATIENT
Start: 2023-12-13 | End: 2023-12-27

## 2023-12-13 RX ORDER — FENTANYL CITRATE 50 UG/ML
50 INJECTION INTRAVENOUS
Refills: 0 | Status: DISCONTINUED | OUTPATIENT
Start: 2023-12-13 | End: 2023-12-13

## 2023-12-13 RX ORDER — DEXTROSE 50 % IN WATER 50 %
12.5 SYRINGE (ML) INTRAVENOUS ONCE
Refills: 0 | Status: DISCONTINUED | OUTPATIENT
Start: 2023-12-13 | End: 2023-12-27

## 2023-12-13 RX ORDER — ATORVASTATIN CALCIUM 80 MG/1
10 TABLET, FILM COATED ORAL AT BEDTIME
Refills: 0 | Status: DISCONTINUED | OUTPATIENT
Start: 2023-12-13 | End: 2023-12-27

## 2023-12-13 RX ORDER — CILOSTAZOL 100 MG/1
1 TABLET ORAL
Refills: 0 | DISCHARGE

## 2023-12-13 RX ORDER — ATORVASTATIN CALCIUM 80 MG/1
1 TABLET, FILM COATED ORAL
Refills: 0 | DISCHARGE

## 2023-12-13 RX ORDER — SENNA PLUS 8.6 MG/1
1 TABLET ORAL AT BEDTIME
Refills: 0 | Status: DISCONTINUED | OUTPATIENT
Start: 2023-12-13 | End: 2023-12-27

## 2023-12-13 RX ADMIN — DORZOLAMIDE HYDROCHLORIDE 1 DROP(S): 20 SOLUTION/ DROPS OPHTHALMIC at 19:00

## 2023-12-13 RX ADMIN — ATORVASTATIN CALCIUM 10 MILLIGRAM(S): 80 TABLET, FILM COATED ORAL at 22:22

## 2023-12-13 RX ADMIN — SODIUM CHLORIDE 50 MILLILITER(S): 9 INJECTION, SOLUTION INTRAVENOUS at 12:16

## 2023-12-13 RX ADMIN — LATANOPROST 1 DROP(S): 0.05 SOLUTION/ DROPS OPHTHALMIC; TOPICAL at 22:23

## 2023-12-13 RX ADMIN — Medication 100 MILLIGRAM(S): at 19:01

## 2023-12-13 RX ADMIN — SENNA PLUS 1 TABLET(S): 8.6 TABLET ORAL at 22:22

## 2023-12-13 NOTE — DISCHARGE NOTE PROVIDER - NSDCFUADDINST_GEN_ALL_CORE_FT
CATHETER: Some patients are sent home with a casiano catheter, while others go home urinating on their own. If you still have a catheter, the nurses will review instructions and care before you go home.  GENERAL: It is common to have blood in your urine after your procedure. It may be pink or even red; inform your doctor if you have a significant amount of clot in the urine or if you are unable to void at all or if your catheter stops draining. The urine may clear and then become bloody again especially as you are more physically active. It is not uncommon to have some burning when you urinate, this will gradually improve. With a catheter in place, it is not uncommon to have occasional leakage or urine or blood around the catheter. Please call your urologist if this is excessive and/or the urine is not draining through the catheter into the bag.  BATHING: You may shower or bathe. If going home with casiano, shower only until catheter is removed.  DIET: You may resume your regular diet and regular medication regimen.  PAIN: You may take Tylenol (acetaminophen) 650-975mg and/or Motrin (ibuprofen) 400-600mg, both available over the counter, for pain every 6 hours as needed. Do not exceed 4000mg of Tylenol (acetaminophen) daily. You may alternate these medications such that you take one or the other every 3 hours for around the clock pain coverage.  ANTIBIOTICS: Finish the Bactrim that you have at home   STOOL SOFTENERS: Do not allow yourself to become constipated as straining may cause bleeding. Take stool softeners or a laxative (ex. Miralax, Colace, Senokot, ExLax, etc), available over the counter, if needed.  ACTIVITY: No heavy lifting or strenuous exercise until you are evaluated at your post-operative appointment. Otherwise, you may return to your usual level of physical activity.  ANTICOAGULATION: Restart your aspirin on Friday   FOLLOW-UP: Please see Dr. Amaya on Friday for casiano removal   CALL YOUR UROLOGIST IF: You have any bleeding that does not stop, inability to void >8 hours, fever over 100.4 F, chills, persistent nausea/vomiting, changes in your incision concerning for infection, or if your pain is not controlled on your discharge pain medications.   CATHETER: Some patients are sent home with a casiano catheter, while others go home urinating on their own. If you still have a catheter, the nurses will review instructions and care before you go home.  GENERAL: It is common to have blood in your urine after your procedure. It may be pink or even red; inform your doctor if you have a significant amount of clot in the urine or if you are unable to void at all or if your catheter stops draining. The urine may clear and then become bloody again especially as you are more physically active. It is not uncommon to have some burning when you urinate, this will gradually improve. With a catheter in place, it is not uncommon to have occasional leakage or urine or blood around the catheter. Please call your urologist if this is excessive and/or the urine is not draining through the catheter into the bag.  BATHING: You may shower or bathe. If going home with casiano, shower only until catheter is removed.  DIET: You may resume your regular diet and regular medication regimen.  PAIN: You may take Tylenol (acetaminophen) 650-975mg and/or Motrin (ibuprofen) 400-600mg, both available over the counter, for pain every 6 hours as needed. Do not exceed 4000mg of Tylenol (acetaminophen) daily. You may alternate these medications such that you take one or the other every 3 hours for around the clock pain coverage.  ANTIBIOTICS: Finish the Bactrim that you have at home. Take for 7 days as prescribed on bottle.   STOOL SOFTENERS: Do not allow yourself to become constipated as straining may cause bleeding. Take stool softeners or a laxative (ex. Miralax, Colace, Senokot, ExLax, etc), available over the counter, if needed.  ACTIVITY: No heavy lifting or strenuous exercise until you are evaluated at your post-operative appointment. Otherwise, you may return to your usual level of physical activity.  ANTICOAGULATION: Restart your aspirin on Friday 12/15, confirm at office appointment with Dr. Jose Luis crockett to restart  FOLLOW-UP: Please see Dr. Amaya on Friday 12/15 for casiano removal   CALL YOUR UROLOGIST IF: You have any bleeding that does not stop, inability to void >8 hours, fever over 100.4 F, chills, persistent nausea/vomiting, changes in your incision concerning for infection, or if your pain is not controlled on your discharge pain medications.

## 2023-12-13 NOTE — PATIENT PROFILE ADULT - FALL HARM RISK - HARM RISK INTERVENTIONS
Assistance with ambulation/Assistance OOB with selected safe patient handling equipment/Communicate Risk of Fall with Harm to all staff/Monitor gait and stability/Reinforce activity limits and safety measures with patient and family/Sit up slowly, dangle for a short time, stand at bedside before walking/Tailored Fall Risk Interventions/Use of alarms - bed, chair and/or voice tab/Visual Cue: Yellow wristband and red socks/Bed in lowest position, wheels locked, appropriate side rails in place/Call bell, personal items and telephone in reach/Instruct patient to call for assistance before getting out of bed or chair/Non-slip footwear when patient is out of bed/[place] to call system/Physically safe environment - no spills, clutter or unnecessary equipment/Purposeful Proactive Rounding/Room/bathroom lighting operational, light cord in reach Assistance with ambulation/Assistance OOB with selected safe patient handling equipment/Communicate Risk of Fall with Harm to all staff/Monitor gait and stability/Reinforce activity limits and safety measures with patient and family/Sit up slowly, dangle for a short time, stand at bedside before walking/Tailored Fall Risk Interventions/Use of alarms - bed, chair and/or voice tab/Visual Cue: Yellow wristband and red socks/Bed in lowest position, wheels locked, appropriate side rails in place/Call bell, personal items and telephone in reach/Instruct patient to call for assistance before getting out of bed or chair/Non-slip footwear when patient is out of bed/[place] to call system/Physically safe environment - no spills, clutter or unnecessary equipment/Purposeful Proactive Rounding/Room/bathroom lighting operational, light cord in reach

## 2023-12-13 NOTE — H&P PST ADULT - HISTORY OF PRESENT ILLNESS
This is a 72 y/o male with PMHX of DM 2, ASCVD (CABGx3 in 2023), HTN, HLD who presents to PST with pre-operative diagnosis of enlarged prostate with LUTS.  Pt reports h/o incomplete emptying of bladder, and nocturia. Pt has a casiano catheter in place.  Today he denies gross hematuria.  Pt feels well and denies any acute symptoms.  This is a 74 y/o male with PMHX of DM 2, ASCVD (CABGx3 in 2023), HTN, HLD who presents to PST with pre-operative diagnosis of enlarged prostate with LUTS.  Pt reports h/o incomplete emptying of bladder, and nocturia. Pt has a casiano catheter in place.  Today he denies gross hematuria.  Pt feels well and denies any acute symptoms.

## 2023-12-13 NOTE — ASU PREOP CHECKLIST - SITE MARKED BY SURGEON
Discussion/Summary   blood tests showed the cholesterol is much better thia time. no anemia noted. mild increased glucose noted. see Dr. Blackwood as scheduled for further discussion.        Verified Results  COMP METABOLIC PANEL WITH LIPID PANEL AND CBCA (CPNL,CBCA,HDL) [id number] 09:40AM IFEANYI BLACKWOOD     Test Name Result Flag Reference   WHITE BLOOD COUNT 4.3 K/mcL  4.2-11.0   RED CELL COUNT 4.54 mil/mcL  4.00-5.20   HEMOGLOBIN 13.0 g/dl  12.0-15.5   HEMATOCRIT 40.1 %  36.0-46.5   MEAN CORPUSCULAR VOLUME 88.3 fL  78.0-100.0   MEAN CORPUSCULAR HEMOGLOBIN 28.6 pg  26.0-34.0   MEAN CORPUSCULAR HGB CONC 32.4 g/dl  32.0-36.5   RDW-CV 13.1 %  11.0-15.0   PLATELET COUNT 201 K/mcL  140-450   DIFF TYPE      AUTOMATED DIFFERENTIAL   PRABHJOT% 46 %     LYM% 44 %     MON% 7 %     EOS% 2 %     BASO% 1 %     PRABHJOT ABS 2.0 K/mcL  1.8-7.7   LYM ABS 1.9 K/mcL  1.0-4.0   MON ABS 0.3 K/mcL  0.3-0.9   EOS ABS 0.1 K/mcL  0.1-0.5   BASO ABS 0.1 K/mcL  0.0-0.3   FASTING STATUS FASTING hrs     SODIUM 143 mmol/L  135-145   POTASSIUM 4.5 mmol/L  3.4-5.1   CHLORIDE 106 mmol/L     CARBON DIOXIDE 28 mmol/L  21-32   ANION GAP 14 mmol/L  10-20   GLUCOSE 105 mg/dl H 65-99   BUN 14 mg/dl  6-20   CREATININE 0.74 mg/dl  0.51-0.95   GFR EST.AFRICAN AMER >90     eGFR results = or >90 mL/min/1.73m2 = Normal kidney function.   GFR EST.NONAFRI AMER 82     eGFR 60 - 89 mL/min/1.73m2 = Mild decrease in kidney function.   BUN/CREATININE RATIO 19  7-25   CALCIUM 9.0 mg/dl  8.4-10.2   BILIRUBIN TOTAL 0.5 mg/dl  0.2-1.0   GOT/AST 24 Units/L  <38   GPT/ALT 21 Units/L  <79   ALKALINE PHOSPHATASE 63 Units/L     TOTAL PROTEIN 7.4 g/dl  6.4-8.2   ALBUMIN 3.8 g/dl  3.6-5.1   GLOBULIN (CALCULATED) 3.6 g/dl  2.0-4.0   A/G RATIO 1.1  1.0-2.4   FASTING STATUS FASTING hrs     CHOLESTEROL 150 mg/dl  <200   Desirable            <200  Borderline High      200 to 239  High                 >=240   LDL CHOLESTEROL (CALCULATED) 85 mg/dl  <130   OPTIMAL               <100  NEAR  OPTIMAL          100-129  BORDERLINE HIGH       130-159  HIGH                  160-189  VERY HIGH             >=190   HDL CHOLESTEROL 47 mg/dl L >49   Low            <40  Borderline Low 40 to 49  Near Optimal   50 to 59  Optimal        >=60   TRIGLYCERIDES 89 mg/dl  <150   Normal                   <150  Borderline High          150 to 199  High                     200 to 499  Very High                >=500   NON-HDL CHOLESTEROL 103 mg/dl     Therapeutic Target:  CHD and risk equivalents <130  Multiple risk factors    <160  0 to 1 risk factors      <190   CHOLESTEROL/HDL RATIO 3.2  <4.5        yes

## 2023-12-13 NOTE — H&P PST ADULT - PRO ARRIVE FROM
[facility]    Medicine Progress Note - Hospitalist Service       Date of Admission:  1/31/2020  Assessment & Plan   Gennaro Walton is a 89-year-old female with past medical history significant for mild cognitive impairment, hypertension, hypothyroidism, and history of falls as well as some recent shoulder pain, who presents to the emergency room for evaluation after a fall which resulted in right hip pain.  She was found to have a femur fracture.     Right intertrochanteric femur fracture s/p ORIF 2/1/20  Sustained after what sounds to be a mechanical fall. Now s/p Intramedullary nailing, right proximal femur fracture on 2/1/20. EBL 25mL.   - Orthopedics following  - PT recommending TCU vs memory care TCU, SW on board  - Repeat labs in AM    Left medial anterior leg laceration  ~4cm in length, no surrounding erythema. It seems this happened during the fall. No sutured in the ED. Open to air and subcutaneous layer exposed.  - >48' since presentation so unable to close   - Monitor wound healing, irrigate with NS and cover with cause, await WOCN consult for further recommendations, appreciate assistance    Acute blood loss anemia   Baseline Hgb 12 range.  - Post surgery downtrended POD #0 9.8  - Monitor Hgb closely, some dilutional component  - Discontinue IVF - encourage PO intake  - Recommend transfusion if Hgb <7  - ASA 325mg/d for DVT ppx, monitor Hgb closely, continues to downtrend, monitor for symptoms of SOB, lightheadedness, etc...    Recent Labs   Lab 02/03/20  0715 02/02/20  0454 02/01/20  0632 01/31/20  1332   HGB 7.5* 8.3* 9.8* 12.1     Shoulder pain secondary to injury with rotator cuff and biceps tendon tears s/p MRI 1/31/20 revealing:  Complete, full-thickness tears of the supraspinatus and infraspinatus tendons. Severe atrophy of the supraspinatus and infraspinatus muscles. Full-thickness tearing of a portion of the subscapularis tendon. Moderate subscapularis muscle atrophy. Advanced  rotator cuff arthropathy. Glenohumeral joint effusion with synovitis and joint bodies. Complete tear of the long head biceps tendon. Severe degenerative arthrosis of the AC joint.    She underwent an evaluation with an MRI of Rt shoulder at [facility] earlier day of admit which showed the above results.   - Lidoderm patch as previously prescribed  - Update Ortho on the above MRI    Abnormal UA without UTI  UA Large LE, , mod blood, neg nitrites.  - Cortez in place  - Started Ceftriaxone 1gm Q 24' , discontinue due to neg UCx    Hypertension:    Blood pressures are elevated postoperative but suspect this is secondary in the setting of pain with her hip fracture. BPs now improved.  - Monitor     Hypothyroidism:  Chronic and stable on levothyroxine.  This can be continued.     Diet: Regular Diet Adult    DVT Prophylaxis: ASA per Orthopedics  Cortez Catheter: in place, indication: Strict 1-2 Hour I&O, Strict 1-2 Hour I&O  Code Status: DNR/DNI      Disposition Plan   Expected discharge: 2 - 3 days, recommended to transitional care unit once adequate pain management/ tolerating PO medications, antibiotic plan established, hemoglobin stable, safe disposition plan/ TCU bed available and cleared by orthopedics.  Entered: Smiley Powell PA-C 02/03/2020, 4:21 PM       The patient's care was discussed with the Attending Physician, Dr. Davis, Bedside Nurse and Patient.    Smiley Powell PA-C ([place])  Hospitalist Service  [facility]    ______________________________________________________________________    Interval History   Seen and examined. No complaints. Ate 25% bfast, taking fluids. No N/V. +Pain to Rt hip as expected, managed. Left anterior leg laceration evaluated. UCx neg.    Data reviewed today: I reviewed all medications, new labs and imaging results over the last 24 hours. I personally reviewed no images or EKG's today.    Physical Exam   Vital Signs: Temp: 98.1  F (36.7  C) Temp src:  Oral BP: 114/59 Pulse: 59 Heart Rate: 68 Resp: 18 SpO2: 91 % O2 Device: Nasal cannula Oxygen Delivery: 1 LPM  Weight: 99 lbs 6.84 oz    General: Awake, alert, elderly woman who appears stated age. Looks comfortable laying in bed. No acute distress.  HEENT: Normocephalic, atraumatic. Extraocular movements intact.   Respiratory: Clear to auscultation bilaterally, no rales, wheezing, or rhonchi to anterolateral fields. 1L NC in place for comfort, removed during visit.   Cardiovascular: Regular rate and rhythm, +S1 and S2, no murmur auscultated. No peripheral edema.   Gastrointestinal: Soft, non-tender, non-distended. Bowel sounds present. +Cortez.  Skin: Warm, dry. No obvious rashes or lesions on exposed skin. Dorsalis pedis pulses palpable bilaterally.  Musculoskeletal: No joint swelling, erythema or tenderness. Moves all extremities equally. 5/5 dorsi/plantar flexion to LLE, RLE 4/5. Rt hip with surgical bandages, c/d/i.  Neurologic: AAO x3. Cranial nerves 2-12 grossly intact, normal strength and sensation.  Psychiatric: Pleasantly confused, answers questions appropriately.    Data   Recent Labs   Lab 02/03/20  0715 02/02/20  0454 02/01/20  0632   WBC 5.6 6.6 7.4   HGB 7.5* 8.3* 9.8*   * 103* 102*   * 156 203    138 137   POTASSIUM 3.7 3.9 3.8   CHLORIDE 107 107 106   CO2 28 29 29   BUN 10 11 12   CR 0.50* 0.43* 0.48*   ANIONGAP 3 2* 2*   LORETA 8.0* 8.3* 8.3*   GLC 77 94 99     No results found for this or any previous visit (from the past 24 hour(s)).  Medications     - MEDICATION INSTRUCTIONS -       - MEDICATION INSTRUCTIONS -       sodium chloride 75 mL/hr at 01/31/20 1740       [START ON 2/5/2020] acetaminophen  1,000 mg Oral BID     acetaminophen  975 mg Oral Q8H     amLODIPine  2.5 mg Oral Daily     aspirin  325 mg Oral Daily     levothyroxine  88 mcg Oral Daily     Lidocaine  1 patch Transdermal Q24H     lidocaine   Transdermal Q8H     losartan  50 mg Oral Daily     oxybutynin ER  10 mg Oral  At Bedtime     senna-docusate  1 tablet Oral BID    Or     senna-docusate  2 tablet Oral BID     sodium chloride (PF)  3 mL Intracatheter Q8H        home

## 2023-12-13 NOTE — PROGRESS NOTE ADULT - ASSESSMENT
A/P: 73y Male s/p TURP  CBI until am  am labs  clamp cbi in am  anticipate dc home tomorrow with casiano  fu fri in the office for tov  restart asa fri  abx total seven days on dc. has bactrim at home     DVT prophylaxis/OOB  Incentive spirometry  Strict I&O's  Analgesia and antiemetics as needed  Diet

## 2023-12-13 NOTE — DISCHARGE NOTE PROVIDER - NSDCMRMEDTOKEN_GEN_ALL_CORE_FT
Aspirin Enteric Coated 81 mg oral delayed release tablet: 1 tab(s) orally once a day  atorvastatin 10 mg oral tablet: 1 tab(s) orally once a day  cilostazol 100 mg oral tablet: 1 tab(s) orally once a day  dorzolamide 2% ophthalmic solution: 1 drop(s) to each affected eye 2 times a day  labetalol 100 mg oral tablet: 1 tab(s) orally 2 times a day  latanoprost 0.005% ophthalmic solution: 1 drop(s) in each eye once a day  metFORMIN 500 mg oral tablet: 1 tab(s) orally 2 times a day  ramipril 2.5 mg oral tablet: orally once a day  tamsulosin 0.4 mg oral capsule: 1 cap(s) orally once a day   atorvastatin 10 mg oral tablet: 1 tab(s) orally once a day  cilostazol 100 mg oral tablet: 1 tab(s) orally once a day  dorzolamide 2% ophthalmic solution: 1 drop(s) to each affected eye 2 times a day  labetalol 100 mg oral tablet: 1 tab(s) orally 2 times a day  latanoprost 0.005% ophthalmic solution: 1 drop(s) in each eye once a day  metFORMIN 500 mg oral tablet: 1 tab(s) orally 2 times a day  ramipril 2.5 mg oral tablet: orally once a day  tamsulosin 0.4 mg oral capsule: 1 cap(s) orally once a day

## 2023-12-13 NOTE — H&P PST ADULT - PROBLEM SELECTOR PROBLEM 1
Outgoing call to [employer] to check status of PAP application. Rep stated they called [employer] to do a BI but they would not give any information. Benlysta gateway tried to call the pt to make a conference call with her insurance but pt did not answer.     Outgoing call to pt to let her know she needs to call [employer] at [phone] to make a conference call to her insurance so a BI can be done. No answer, LVM and sent a [employer] msg.    Enlarged prostate with lower urinary tract symptoms (LUTS)

## 2023-12-13 NOTE — DISCHARGE NOTE PROVIDER - PROVIDER TOKENS
PROVIDER:[TOKEN:[[id number]],SCHEDULEDAPPT:[12/15/2023]] PROVIDER:[TOKEN:[[id number]],SCHEDULEDAPPT:[12/15/2023]]

## 2023-12-13 NOTE — ASU PATIENT PROFILE, ADULT - FALL HARM RISK - UNIVERSAL INTERVENTIONS
Bed in lowest position, wheels locked, appropriate side rails in place/Call bell, personal items and telephone in reach/Instruct patient to call for assistance before getting out of bed or chair/Non-slip footwear when patient is out of bed/[place] to call system/Physically safe environment - no spills, clutter or unnecessary equipment/Purposeful Proactive Rounding/Room/bathroom lighting operational, light cord in reach Bed in lowest position, wheels locked, appropriate side rails in place/Call bell, personal items and telephone in reach/Instruct patient to call for assistance before getting out of bed or chair/Non-slip footwear when patient is out of bed/[place] to call system/Physically safe environment - no spills, clutter or unnecessary equipment/Purposeful Proactive Rounding/Room/bathroom lighting operational, light cord in reach

## 2023-12-13 NOTE — DISCHARGE NOTE PROVIDER - HOSPITAL COURSE
Pt is a 74 yo m with a pmh of DM2, htn CABG 2023 and bph requiring casiano catheter who arrived at [facility] on 12/13/23 for a scheduled TURP.  Please see operative report  for further details.  Post op the patient was placed on CBI. It was successfully weaned on POD1.  post op he did well. his vitals were stable, he tolerated diet, his pain was controlled, he ambulated  and his labs were stable.   Pt is a 74 yo m with a pmh of DM2, htn CABG 2023 and bph requiring casiano catheter who arrived at [facility] on 12/13/23 for a scheduled TURP.  Please see operative report  for further details.  Post op the patient was placed on CBI. It was successfully weaned on POD1.  post op he did well. his vitals were stable, he tolerated diet, his pain was controlled, he ambulated  and his labs were stable.   Pt is a 72 YO M with a pmh of DM2, htn CABG 2023 and bph requiring casiano catheter who arrived at [facility] on 12/13/23 for a scheduled TURP.  Please see operative report  for further details.  Post op the patient was placed on CBI. It was successfully weaned on POD1.  post op he did well. his vitals were stable, he tolerated diet, his pain was controlled, he ambulated  and his labs were stable.  On POD1, pt was evaluated and determined stable for discharge by attending surgeon. Pt understands and agreeable to plan. Pt will be discharged with casiano in place.   Pt is a 74 YO M with a pmh of DM2, htn CABG 2023 and bph requiring casiano catheter who arrived at [facility] on 12/13/23 for a scheduled TURP.  Please see operative report  for further details.  Post op the patient was placed on CBI. It was successfully weaned on POD1.  post op he did well. his vitals were stable, he tolerated diet, his pain was controlled, he ambulated  and his labs were stable.  On POD1, pt was evaluated and determined stable for discharge by attending surgeon. Pt understands and agreeable to plan. Pt will be discharged with casiano in place.

## 2023-12-13 NOTE — PATIENT PROFILE ADULT - DO YOU FEEL THREATENED BY OTHERS?
Writer placed call to patient, and wife, and advised that treatment would be coming from provider he saw yesterday and that consultation between that provider and Dr Hua Anne is pending. Writer paged Dr Anne for follow-up ad added that wife, Patrizia, is also requesting a topical steroid for itching/pain.   no

## 2023-12-13 NOTE — DISCHARGE NOTE PROVIDER - CARE PROVIDER_API CALL
James Ingram  Urology  [address] [place] [address]  Phone: [phone]  Fax: [phone]  Scheduled Appointment: 12/15/2023   James Ingram  Urology  [address] [place] [id number]  Phone: [phone]  Fax: [phone]  Scheduled Appointment: 12/15/2023

## 2023-12-13 NOTE — DISCHARGE NOTE PROVIDER - NSDCFUSCHEDAPPT_GEN_ALL_CORE_FT
Reyes, John A  [facility]  INTMED [address]  Scheduled Appointment: 02/12/2024    Lai Vieira  [facility]  CARDIOLOGY [address]  Scheduled Appointment: 02/16/2024     Reyes, John A  [facility]  INT[facility] [address]  Scheduled Appointment: 02/12/2024    Lai Vieira  [facility]  CARDIOLOGY [address]  Scheduled Appointment: 02/16/2024     [facility]  UROLOGY [address] [facility]  Scheduled Appointment: 12/15/2023    James Lemus  [facility]  UROLOGY [address] [facility] [address]  Scheduled Appointment: 12/15/2023    Reyes, John A  [facility]  [address]  Scheduled Appointment: 02/12/2024    Lai Vieira  [facility]  CARDIOLOGY [address]  Scheduled Appointment: 02/16/2024     [facility]  UROLOGY [address] [address]  Scheduled Appointment: 12/15/2023    James Lemus  [facility]  UROLOGY [address]  Scheduled Appointment: 12/15/2023    Reyes, John A  [facility]  INTMED [address]  Scheduled Appointment: 02/12/2024    Lai Vieira  [facility]  CARDIOLOGY [address]  Scheduled Appointment: 02/16/2024

## 2023-12-13 NOTE — PROGRESS NOTE ADULT - SUBJECTIVE AND OBJECTIVE BOX
Post op Check    Pt seen and examined without complaints. Pain is controlled. Denies SOB/CP/N/V.     Vital Signs Last 24 Hrs  T(C): 36.6 (13 Dec 2023 17:20), Max: 36.6 (13 Dec 2023 05:04)  T(F): 97.9 (13 Dec 2023 17:20), Max: 97.9 (13 Dec 2023 17:20)  HR: 68 (13 Dec 2023 17:20) (62 - 80)  BP: 132/74 (13 Dec 2023 17:20) (118/60 - 160/85)  BP(mean): --  RR: 15 (13 Dec 2023 17:20) (14 - 18)  SpO2: 98% (13 Dec 2023 17:20) (97% - 100%)    Parameters below as of 13 Dec 2023 17:20  Patient On (Oxygen Delivery Method): room air        I&O's Summary    13 Dec 2023 07:01  -  13 Dec 2023 17:58  --------------------------------------------------------  IN: 77991 mL / OUT: 71595 mL / NET: 3510 mL        Physical Exam  Gen: awake alert NAD AXOX3  Pulm: No respiratory distress, no subcostal retractions  CV: RRR, no JVD  Abd: Soft, NT, ND  Back: no cvat bl  :  urine clear on medium cbi                          13.4   6.41  )-----------( 198      ( 13 Dec 2023 15:50 )             38.6       12-13    136  |  106  |  19  ----------------------------<  119<H>  4.8   |  23  |  0.91    Ca    8.2<L>      13 Dec 2023 15:50          Post op Check    Pt seen and examined without complaints. Pain is controlled. Denies SOB/CP/N/V.     Vital Signs Last 24 Hrs  T(C): 36.6 (13 Dec 2023 17:20), Max: 36.6 (13 Dec 2023 05:04)  T(F): 97.9 (13 Dec 2023 17:20), Max: 97.9 (13 Dec 2023 17:20)  HR: 68 (13 Dec 2023 17:20) (62 - 80)  BP: 132/74 (13 Dec 2023 17:20) (118/60 - 160/85)  BP(mean): --  RR: 15 (13 Dec 2023 17:20) (14 - 18)  SpO2: 98% (13 Dec 2023 17:20) (97% - 100%)    Parameters below as of 13 Dec 2023 17:20  Patient On (Oxygen Delivery Method): room air        I&O's Summary    13 Dec 2023 07:01  -  13 Dec 2023 17:58  --------------------------------------------------------  IN: 59464 mL / OUT: 87911 mL / NET: 3510 mL        Physical Exam  Gen: awake alert NAD AXOX3  Pulm: No respiratory distress, no subcostal retractions  CV: RRR, no JVD  Abd: Soft, NT, ND  Back: no cvat bl  :  urine clear on medium cbi                          13.4   6.41  )-----------( 198      ( 13 Dec 2023 15:50 )             38.6       12-13    136  |  106  |  19  ----------------------------<  119<H>  4.8   |  23  |  0.91    Ca    8.2<L>      13 Dec 2023 15:50

## 2023-12-14 ENCOUNTER — TRANSCRIPTION ENCOUNTER (OUTPATIENT)
Age: 73
End: 2023-12-14

## 2023-12-14 VITALS
HEART RATE: 82 BPM | RESPIRATION RATE: 18 BRPM | SYSTOLIC BLOOD PRESSURE: 130 MMHG | DIASTOLIC BLOOD PRESSURE: 67 MMHG | TEMPERATURE: 97 F | OXYGEN SATURATION: 98 %

## 2023-12-14 PROBLEM — N40.1 BENIGN PROSTATIC HYPERPLASIA WITH LOWER URINARY TRACT SYMPTOMS: Chronic | Status: ACTIVE | Noted: 2023-11-29

## 2023-12-14 LAB
A1C WITH ESTIMATED AVERAGE GLUCOSE RESULT: 6 % — HIGH (ref 4–5.6)
A1C WITH ESTIMATED AVERAGE GLUCOSE RESULT: 6 % — HIGH (ref 4–5.6)
ANION GAP SERPL CALC-SCNC: 9 MMOL/L — SIGNIFICANT CHANGE UP (ref 5–17)
ANION GAP SERPL CALC-SCNC: 9 MMOL/L — SIGNIFICANT CHANGE UP (ref 5–17)
BASOPHILS # BLD AUTO: 0.02 K/UL — SIGNIFICANT CHANGE UP (ref 0–0.2)
BASOPHILS # BLD AUTO: 0.02 K/UL — SIGNIFICANT CHANGE UP (ref 0–0.2)
BASOPHILS NFR BLD AUTO: 0.2 % — SIGNIFICANT CHANGE UP (ref 0–2)
BASOPHILS NFR BLD AUTO: 0.2 % — SIGNIFICANT CHANGE UP (ref 0–2)
BUN SERPL-MCNC: 21 MG/DL — SIGNIFICANT CHANGE UP (ref 7–23)
BUN SERPL-MCNC: 21 MG/DL — SIGNIFICANT CHANGE UP (ref 7–23)
CALCIUM SERPL-MCNC: 8.7 MG/DL — SIGNIFICANT CHANGE UP (ref 8.4–10.5)
CALCIUM SERPL-MCNC: 8.7 MG/DL — SIGNIFICANT CHANGE UP (ref 8.4–10.5)
CHLORIDE SERPL-SCNC: 104 MMOL/L — SIGNIFICANT CHANGE UP (ref 96–108)
CHLORIDE SERPL-SCNC: 104 MMOL/L — SIGNIFICANT CHANGE UP (ref 96–108)
CO2 SERPL-SCNC: 27 MMOL/L — SIGNIFICANT CHANGE UP (ref 22–31)
CO2 SERPL-SCNC: 27 MMOL/L — SIGNIFICANT CHANGE UP (ref 22–31)
CREAT SERPL-MCNC: 0.93 MG/DL — SIGNIFICANT CHANGE UP (ref 0.5–1.3)
CREAT SERPL-MCNC: 0.93 MG/DL — SIGNIFICANT CHANGE UP (ref 0.5–1.3)
EGFR: 85 ML/MIN/1.73M2 — SIGNIFICANT CHANGE UP
EGFR: 85 ML/MIN/1.73M2 — SIGNIFICANT CHANGE UP
EOSINOPHIL # BLD AUTO: 0 K/UL — SIGNIFICANT CHANGE UP (ref 0–0.5)
EOSINOPHIL # BLD AUTO: 0 K/UL — SIGNIFICANT CHANGE UP (ref 0–0.5)
EOSINOPHIL NFR BLD AUTO: 0 % — SIGNIFICANT CHANGE UP (ref 0–6)
EOSINOPHIL NFR BLD AUTO: 0 % — SIGNIFICANT CHANGE UP (ref 0–6)
ESTIMATED AVERAGE GLUCOSE: 126 MG/DL — HIGH (ref 68–114)
ESTIMATED AVERAGE GLUCOSE: 126 MG/DL — HIGH (ref 68–114)
GLUCOSE BLDC GLUCOMTR-MCNC: 127 MG/DL — HIGH (ref 70–99)
GLUCOSE BLDC GLUCOMTR-MCNC: 127 MG/DL — HIGH (ref 70–99)
GLUCOSE BLDC GLUCOMTR-MCNC: 147 MG/DL — HIGH (ref 70–99)
GLUCOSE BLDC GLUCOMTR-MCNC: 147 MG/DL — HIGH (ref 70–99)
GLUCOSE SERPL-MCNC: 146 MG/DL — HIGH (ref 70–99)
GLUCOSE SERPL-MCNC: 146 MG/DL — HIGH (ref 70–99)
HCT VFR BLD CALC: 39.2 % — SIGNIFICANT CHANGE UP (ref 39–50)
HCT VFR BLD CALC: 39.2 % — SIGNIFICANT CHANGE UP (ref 39–50)
HGB BLD-MCNC: 13.5 G/DL — SIGNIFICANT CHANGE UP (ref 13–17)
HGB BLD-MCNC: 13.5 G/DL — SIGNIFICANT CHANGE UP (ref 13–17)
IMM GRANULOCYTES NFR BLD AUTO: 0.8 % — SIGNIFICANT CHANGE UP (ref 0–0.9)
IMM GRANULOCYTES NFR BLD AUTO: 0.8 % — SIGNIFICANT CHANGE UP (ref 0–0.9)
LYMPHOCYTES # BLD AUTO: 1.2 K/UL — SIGNIFICANT CHANGE UP (ref 1–3.3)
LYMPHOCYTES # BLD AUTO: 1.2 K/UL — SIGNIFICANT CHANGE UP (ref 1–3.3)
LYMPHOCYTES # BLD AUTO: 10.2 % — LOW (ref 13–44)
LYMPHOCYTES # BLD AUTO: 10.2 % — LOW (ref 13–44)
MCHC RBC-ENTMCNC: 30.1 PG — SIGNIFICANT CHANGE UP (ref 27–34)
MCHC RBC-ENTMCNC: 30.1 PG — SIGNIFICANT CHANGE UP (ref 27–34)
MCHC RBC-ENTMCNC: 34.4 GM/DL — SIGNIFICANT CHANGE UP (ref 32–36)
MCHC RBC-ENTMCNC: 34.4 GM/DL — SIGNIFICANT CHANGE UP (ref 32–36)
MCV RBC AUTO: 87.3 FL — SIGNIFICANT CHANGE UP (ref 80–100)
MCV RBC AUTO: 87.3 FL — SIGNIFICANT CHANGE UP (ref 80–100)
MONOCYTES # BLD AUTO: 1.02 K/UL — HIGH (ref 0–0.9)
MONOCYTES # BLD AUTO: 1.02 K/UL — HIGH (ref 0–0.9)
MONOCYTES NFR BLD AUTO: 8.7 % — SIGNIFICANT CHANGE UP (ref 2–14)
MONOCYTES NFR BLD AUTO: 8.7 % — SIGNIFICANT CHANGE UP (ref 2–14)
NEUTROPHILS # BLD AUTO: 9.46 K/UL — HIGH (ref 1.8–7.4)
NEUTROPHILS # BLD AUTO: 9.46 K/UL — HIGH (ref 1.8–7.4)
NEUTROPHILS NFR BLD AUTO: 80.1 % — HIGH (ref 43–77)
NEUTROPHILS NFR BLD AUTO: 80.1 % — HIGH (ref 43–77)
NRBC # BLD: 0 /100 WBCS — SIGNIFICANT CHANGE UP (ref 0–0)
NRBC # BLD: 0 /100 WBCS — SIGNIFICANT CHANGE UP (ref 0–0)
PLATELET # BLD AUTO: 224 K/UL — SIGNIFICANT CHANGE UP (ref 150–400)
PLATELET # BLD AUTO: 224 K/UL — SIGNIFICANT CHANGE UP (ref 150–400)
POTASSIUM SERPL-MCNC: 4.1 MMOL/L — SIGNIFICANT CHANGE UP (ref 3.5–5.3)
POTASSIUM SERPL-MCNC: 4.1 MMOL/L — SIGNIFICANT CHANGE UP (ref 3.5–5.3)
POTASSIUM SERPL-SCNC: 4.1 MMOL/L — SIGNIFICANT CHANGE UP (ref 3.5–5.3)
POTASSIUM SERPL-SCNC: 4.1 MMOL/L — SIGNIFICANT CHANGE UP (ref 3.5–5.3)
RBC # BLD: 4.49 M/UL — SIGNIFICANT CHANGE UP (ref 4.2–5.8)
RBC # BLD: 4.49 M/UL — SIGNIFICANT CHANGE UP (ref 4.2–5.8)
RBC # FLD: 12.5 % — SIGNIFICANT CHANGE UP (ref 10.3–14.5)
RBC # FLD: 12.5 % — SIGNIFICANT CHANGE UP (ref 10.3–14.5)
SODIUM SERPL-SCNC: 140 MMOL/L — SIGNIFICANT CHANGE UP (ref 135–145)
SODIUM SERPL-SCNC: 140 MMOL/L — SIGNIFICANT CHANGE UP (ref 135–145)
WBC # BLD: 11.79 K/UL — HIGH (ref 3.8–10.5)
WBC # BLD: 11.79 K/UL — HIGH (ref 3.8–10.5)
WBC # FLD AUTO: 11.79 K/UL — HIGH (ref 3.8–10.5)
WBC # FLD AUTO: 11.79 K/UL — HIGH (ref 3.8–10.5)

## 2023-12-14 PROCEDURE — 83036 HEMOGLOBIN GLYCOSYLATED A1C: CPT

## 2023-12-14 PROCEDURE — 85025 COMPLETE CBC W/AUTO DIFF WBC: CPT

## 2023-12-14 PROCEDURE — 82962 GLUCOSE BLOOD TEST: CPT

## 2023-12-14 PROCEDURE — 52601 PROSTATECTOMY (TURP): CPT

## 2023-12-14 PROCEDURE — 85027 COMPLETE CBC AUTOMATED: CPT

## 2023-12-14 PROCEDURE — 80048 BASIC METABOLIC PNL TOTAL CA: CPT

## 2023-12-14 PROCEDURE — 36415 COLL VENOUS BLD VENIPUNCTURE: CPT

## 2023-12-14 PROCEDURE — 88305 TISSUE EXAM BY PATHOLOGIST: CPT

## 2023-12-14 RX ORDER — ASPIRIN/CALCIUM CARB/MAGNESIUM 324 MG
1 TABLET ORAL
Qty: 0 | Refills: 0 | DISCHARGE

## 2023-12-14 RX ADMIN — CILOSTAZOL 100 MILLIGRAM(S): 100 TABLET ORAL at 06:31

## 2023-12-14 RX ADMIN — CEFTRIAXONE 100 MILLIGRAM(S): 500 INJECTION, POWDER, FOR SOLUTION INTRAMUSCULAR; INTRAVENOUS at 13:34

## 2023-12-14 RX ADMIN — DORZOLAMIDE HYDROCHLORIDE 1 DROP(S): 20 SOLUTION/ DROPS OPHTHALMIC at 06:32

## 2023-12-14 RX ADMIN — LISINOPRIL 10 MILLIGRAM(S): 2.5 TABLET ORAL at 05:40

## 2023-12-14 RX ADMIN — Medication 100 MILLIGRAM(S): at 05:38

## 2023-12-14 NOTE — DISCHARGE NOTE NURSING/CASE MANAGEMENT/SOCIAL WORK - NSDCPEFALRISK_GEN_ALL_CORE
For information on Fall & Injury Prevention, visit: [email or web address] OR  [email or web address] OR  https://www.cdc.gov/steadi/patient.html For information on Fall & Injury Prevention, visit: [email or web address] OR  [email or web address] OR  https://www.cdc.gov/steadi/patient.html

## 2023-12-14 NOTE — DISCHARGE NOTE NURSING/CASE MANAGEMENT/SOCIAL WORK - NSDCVIVACCINE_GEN_ALL_CORE_FT
influenza, injectable, quadrivalent, preservative free; 19-Nov-2018 09:50; Mari Sheets (LEELA); Stagee; Y53NN (Exp. Date: 30-Jun-2019); IntraMuscular; Deltoid Right.; 0.5 milliLiter(s); VIS (VIS Published: 07-Aug-2015, VIS Presented: 19-Nov-2018);    influenza, injectable, quadrivalent, preservative free; 19-Nov-2018 09:50; Mari Sheets (LEELA); Simple Admit; Y53NN (Exp. Date: 30-Jun-2019); IntraMuscular; Deltoid Right.; 0.5 milliLiter(s); VIS (VIS Published: 07-Aug-2015, VIS Presented: 19-Nov-2018);

## 2023-12-14 NOTE — DISCHARGE NOTE NURSING/CASE MANAGEMENT/SOCIAL WORK - PATIENT PORTAL LINK FT
You can access the FollowMyHealth Patient Portal offered by [facility] by registering at the following website: [email or web address]. By joining [employer]’s FollowMyHealth portal, you will also be able to view your health information using other applications (apps) compatible with our system. You can access the FollowMyHealth Patient Portal offered by [facility] by registering at the following website: [email or web address]. By joining [employer]’s FollowMyHealth portal, you will also be able to view your health information using other applications (apps) compatible with our system.

## 2023-12-15 ENCOUNTER — APPOINTMENT (OUTPATIENT)
Dept: UROLOGY | Facility: CLINIC | Age: 73
End: 2023-12-15
Payer: MEDICARE

## 2023-12-15 VITALS
DIASTOLIC BLOOD PRESSURE: 65 MMHG | SYSTOLIC BLOOD PRESSURE: 145 MMHG | HEIGHT: 70 IN | OXYGEN SATURATION: 97 % | WEIGHT: 192 LBS | HEART RATE: 73 BPM | BODY MASS INDEX: 27.49 KG/M2

## 2023-12-15 PROBLEM — I25.10 ATHEROSCLEROTIC HEART DISEASE OF NATIVE CORONARY ARTERY WITHOUT ANGINA PECTORIS: Chronic | Status: ACTIVE | Noted: 2023-11-29

## 2023-12-15 PROCEDURE — 51798 US URINE CAPACITY MEASURE: CPT

## 2023-12-15 PROCEDURE — 51700 IRRIGATION OF BLADDER: CPT | Mod: 58

## 2023-12-15 PROCEDURE — 99024 POSTOP FOLLOW-UP VISIT: CPT

## 2023-12-15 PROCEDURE — 51741 ELECTRO-UROFLOWMETRY FIRST: CPT

## 2023-12-15 PROCEDURE — A4216: CPT | Mod: NC

## 2023-12-26 LAB
SURGICAL PATHOLOGY STUDY: SIGNIFICANT CHANGE UP
SURGICAL PATHOLOGY STUDY: SIGNIFICANT CHANGE UP

## 2024-01-03 ENCOUNTER — RX RENEWAL (OUTPATIENT)
Age: 74
End: 2024-01-03

## 2024-01-03 RX ORDER — RAMIPRIL 5 MG/1
5 CAPSULE ORAL DAILY
Qty: 90 | Refills: 3 | Status: ACTIVE | COMMUNITY
Start: 2018-01-09 | End: 1900-01-01

## 2024-01-23 ENCOUNTER — APPOINTMENT (OUTPATIENT)
Dept: UROLOGY | Facility: CLINIC | Age: 74
End: 2024-01-23
Payer: MEDICARE

## 2024-01-23 VITALS
BODY MASS INDEX: 27.49 KG/M2 | OXYGEN SATURATION: 98 % | TEMPERATURE: 97 F | HEIGHT: 70 IN | SYSTOLIC BLOOD PRESSURE: 128 MMHG | DIASTOLIC BLOOD PRESSURE: 70 MMHG | WEIGHT: 192 LBS | HEART RATE: 88 BPM

## 2024-01-23 PROCEDURE — 51741 ELECTRO-UROFLOWMETRY FIRST: CPT

## 2024-01-23 PROCEDURE — 51798 US URINE CAPACITY MEASURE: CPT

## 2024-01-23 PROCEDURE — 99024 POSTOP FOLLOW-UP VISIT: CPT

## 2024-01-23 RX ORDER — TAMSULOSIN HYDROCHLORIDE 0.4 MG/1
0.4 CAPSULE ORAL
Qty: 90 | Refills: 3 | Status: COMPLETED | COMMUNITY
Start: 2022-11-11 | End: 2024-01-23

## 2024-01-23 NOTE — HISTORY OF PRESENT ILLNESS
[Nocturia] : nocturia [FreeTextEntry1] : Mr. LEONG is a 72 year  White  M who comes today to clinic for elevated PSA to 4.5 referred by Dr. Reyes. s/p Triple CABG. chronic Wong catheter with urinary retention. mpMRI: Multiple linear/wedge-shaped areas of T2 hypointense signal, likely inflammatory. PIRADS 2 - Low (clinically significant cancer is unlikely to be present) Prostate Volume: 75 mL PSA density: 0.06 ng/mL/mL Last PSA 4.47> 5.7 (while retaining urine)  1/23/2024 Status post TURP 1 month ago.  Urine clear asymptomatic.  Symptoms remarkably improved. Denies fever chills hematuria.  PVR: 122 cc.  Uroflow: Low volume.

## 2024-01-23 NOTE — PHYSICAL EXAM
[General Appearance - Well Developed] : well developed [General Appearance - Well Nourished] : well nourished [Normal Appearance] : normal appearance [Well Groomed] : well groomed [General Appearance - In No Acute Distress] : no acute distress [Abdomen Tenderness] : non-tender [Abdomen Soft] : soft [Costovertebral Angle Tenderness] : no ~M costovertebral angle tenderness [Urethral Meatus] : meatus normal [Urinary Bladder Findings] : the bladder was normal on palpation [Scrotum] : the scrotum was normal [Testes Mass (___cm)] : there were no testicular masses [Edema] : no peripheral edema [] : no respiratory distress [Respiration, Rhythm And Depth] : normal respiratory rhythm and effort [Exaggerated Use Of Accessory Muscles For Inspiration] : no accessory muscle use [Oriented To Time, Place, And Person] : oriented to person, place, and time [Mood] : the mood was normal [Affect] : the affect was normal [Not Anxious] : not anxious [Normal Station and Gait] : the gait and station were normal for the patient's age [No Focal Deficits] : no focal deficits [No Palpable Adenopathy] : no palpable adenopathy

## 2024-01-23 NOTE — ASSESSMENT
[FreeTextEntry1] : Mr. LEONG is a 72 year White M who comes today to clinic for elevated PSA to 5.18>4.47 referred by Dr. Reyes. mpMRI with diffuse inflammation PIRADS 2.  Prostate 75 cc.  Patient found to be retaining more than 600 cc of urine despite tamsulosin 0.8 mg. Status post TURP 1 month ago.  Doing well.  Discussed discontinuing tamsulosin, patient agrees.

## 2024-01-25 NOTE — DIETITIAN INITIAL EVALUATION ADULT - ENERGY INTAKE
Detail Level: Zone Adequate (%) Pt reports good appetite and PO intake, consuming >75% of meals. Ate 100% of breakfast tray this AM.

## 2024-02-02 ENCOUNTER — RX RENEWAL (OUTPATIENT)
Age: 74
End: 2024-02-02

## 2024-02-12 ENCOUNTER — APPOINTMENT (OUTPATIENT)
Dept: INTERNAL MEDICINE | Facility: CLINIC | Age: 74
End: 2024-02-12
Payer: MEDICARE

## 2024-02-12 VITALS
RESPIRATION RATE: 16 BRPM | TEMPERATURE: 97.3 F | SYSTOLIC BLOOD PRESSURE: 124 MMHG | HEIGHT: 70 IN | OXYGEN SATURATION: 96 % | BODY MASS INDEX: 27.94 KG/M2 | HEART RATE: 71 BPM | DIASTOLIC BLOOD PRESSURE: 72 MMHG | WEIGHT: 195.13 LBS

## 2024-02-12 DIAGNOSIS — I25.10 ATHEROSCLEROTIC HEART DISEASE OF NATIVE CORONARY ARTERY W/OUT ANGINA PECTORIS: ICD-10-CM

## 2024-02-12 DIAGNOSIS — E11.9 TYPE 2 DIABETES MELLITUS W/OUT COMPLICATIONS: ICD-10-CM

## 2024-02-12 DIAGNOSIS — E78.5 HYPERLIPIDEMIA, UNSPECIFIED: ICD-10-CM

## 2024-02-12 DIAGNOSIS — I10 ESSENTIAL (PRIMARY) HYPERTENSION: ICD-10-CM

## 2024-02-12 DIAGNOSIS — N40.1 BENIGN PROSTATIC HYPERPLASIA WITH LOWER URINARY TRACT SYMPMS: ICD-10-CM

## 2024-02-12 PROCEDURE — G2211 COMPLEX E/M VISIT ADD ON: CPT

## 2024-02-12 PROCEDURE — 99214 OFFICE O/P EST MOD 30 MIN: CPT

## 2024-02-12 RX ORDER — METFORMIN HYDROCHLORIDE 500 MG/1
500 TABLET, COATED ORAL
Qty: 90 | Refills: 3 | Status: ACTIVE | COMMUNITY
Start: 2021-11-05

## 2024-02-12 RX ORDER — ASPIRIN ENTERIC COATED TABLETS 81 MG 81 MG/1
81 TABLET, DELAYED RELEASE ORAL DAILY
Qty: 90 | Refills: 1 | Status: ACTIVE | COMMUNITY
Start: 2023-03-20

## 2024-02-12 RX ORDER — ATORVASTATIN CALCIUM 10 MG/1
10 TABLET, FILM COATED ORAL
Qty: 90 | Refills: 1 | Status: ACTIVE | COMMUNITY
Start: 2024-02-02

## 2024-02-12 RX ORDER — ATORVASTATIN CALCIUM 80 MG/1
80 TABLET, FILM COATED ORAL
Qty: 90 | Refills: 1 | Status: DISCONTINUED | COMMUNITY
Start: 2023-01-18 | End: 2024-02-12

## 2024-02-12 RX ORDER — CIPROFLOXACIN HYDROCHLORIDE 500 MG/1
500 TABLET, FILM COATED ORAL
Qty: 20 | Refills: 0 | Status: DISCONTINUED | COMMUNITY
Start: 2023-12-01 | End: 2024-02-12

## 2024-02-12 RX ORDER — EMPAGLIFLOZIN 10 MG/1
10 TABLET, FILM COATED ORAL
Qty: 30 | Refills: 5 | Status: DISCONTINUED | COMMUNITY
Start: 2023-08-14 | End: 2024-02-12

## 2024-02-12 NOTE — HISTORY OF PRESENT ILLNESS
[FreeTextEntry1] : FOllow up [de-identified] : had prostate surgery all went well no catheter taken out at office POD 3 no more flomax urinating better  no cardiac issues

## 2024-02-16 ENCOUNTER — APPOINTMENT (OUTPATIENT)
Dept: CARDIOLOGY | Facility: CLINIC | Age: 74
End: 2024-02-16
Payer: MEDICARE

## 2024-02-16 ENCOUNTER — NON-APPOINTMENT (OUTPATIENT)
Age: 74
End: 2024-02-16

## 2024-02-16 VITALS
BODY MASS INDEX: 26.92 KG/M2 | OXYGEN SATURATION: 96 % | HEART RATE: 80 BPM | DIASTOLIC BLOOD PRESSURE: 67 MMHG | SYSTOLIC BLOOD PRESSURE: 113 MMHG | HEIGHT: 70 IN | WEIGHT: 188 LBS

## 2024-02-16 PROCEDURE — 99213 OFFICE O/P EST LOW 20 MIN: CPT

## 2024-02-16 PROCEDURE — 93000 ELECTROCARDIOGRAM COMPLETE: CPT

## 2024-02-26 NOTE — REASON FOR VISIT
[CV Risk Factors and Non-Cardiac Disease] : CV risk factors and non-cardiac disease [Hyperlipidemia] : hyperlipidemia [Coronary Artery Disease] : coronary artery disease [FreeTextEntry1] : On ramipril and atorvastatin and aspirin no longer on a beta-blocker inquires about the role of antibiotics choose to institute for other dental reasons no history of valve surgery or endocarditis for dental work not indicated from a cardiac standpoint although dentist may no history of valve surgery or endocarditis  7/13/23 cardiac status stable,   At 10/11/2023 Elbert feels fine request renewal of medications no history of smoking although may have had passive inhalation from parents were heavy smokers repeat chest x-ray given a left pleural effusion most likely postoperative consider pulmonary evaluation Dr. Trinidad if symptoms or abnormal chest x-ray noted return visit 4 months fasting blood work  2/16/14  mostly undergoing uroloogic evaluaiotns repeat liioids requested for target ldl 50

## 2024-02-26 NOTE — ASSESSMENT
[FreeTextEntry1] : Without evidence or recent infarction overt heart failure or unstable angina and based upon a satisfactory stress test may undergo planned TURP which constitutes low risk surgery in a patient with intermediate cardiac risk at an ASA class II or III anesthesia risk. Aspirin is discretionary absent a cardiac stent and may be held for 7 days prior to planned surgery..   care coordinated with dr Jose Luis villalobos discussed with patient.

## 2024-02-26 NOTE — CARDIOLOGY SUMMARY
[de-identified] : 1/25/18 stage 3, 8 minutes [de-identified] : 1/18/23 sinus rhythm rightward axis 10/21/23 rsr right axis [de-identified] : 11/17/18 moderately enlarge right ventricle, mild Pulmonic valve regurgitation ejection fraction 55 to 60%\par  3/9/2023 normal left ventricular function postoperative septal motion [de-identified] : 3/7/2023 open heart surgery Dr. Torres

## 2024-02-26 NOTE — HISTORY OF PRESENT ILLNESS
[Preoperative Visit] : for a medical evaluation prior to surgery [Scheduled Procedure ___] : a [unfilled] [Surgeon Name ___] : surgeon: [unfilled] [Date of Surgery ___] : on [unfilled] [FreeTextEntry1] :  ANYA LEONG comes today for evalution. he  was advised to undergo a complete cardiac evaluation. He denies chest pains shortness of breath or loss of consciousnes.

## 2024-02-26 NOTE — DISCUSSION/SUMMARY
[FreeTextEntry1] : I have asked  Jt  to undergo detailed cardiac testing in order to evaluate her overall cardiovascular risk. An assessment of both structural and functional heart disease was recommended to the patient. In this regard, an echocardiogram and a stress test were advised to the patient. I await the upcoming noninvasive cardiac testing in order to assess her overall cardiovascular risk. We discussed the pros and cons of plain treadmill stress testing nuclear stress testing and angiography including a sensitivity analysis.We discussed current ACC guidelines and the calculated 10 year risk is approximately   48% which is severely elevated. More than half of the face to face encounter of 60 minutes   was spent in counseling the patient with respect to  cardiovascular risk. we suspect possible chronic pulmonary disorder given prior right ventricular enlargement on 2018 Quality measures  Tobacco intervention not indicated Statin for prevention of cardiovascular disease indicated Hypertension compensated Aspirin for ischemic vascular disease not indicated Tobacco screening cessation and intervention not indicated  Medical necessity This is a high encounter based upon two or more chronic illnesses with mild exacerbation requiring further management and evaluation.  After careful review of the chart and recommendations after recent pulseless electrical activity.  EKG is indicated for evaluation of shortness of breath  Update 3/31/2023 A long discussion with Jt with respect to recent open heart surgery a pulmonary evaluation with Dr. Weiss and is suggested given axis deviation and physical findings of rhonchi.  We will continue to withhold spironolactone and furosemide given recent pulseless electrical activity which may have promoted dehydration  5/11/2023 Transition to labetalol secondary risk reduction observe for signs and symptoms progressive ambulation blood pressure and cholesterol targets discussed  7/13/23 Medications renewed   10/11/2023 Repeat chest x-ray is requested  2/16/24  repeat lipids are satisfactory. would continue current course  Medical necessity  this is a intermediate risk encounter based upon follow-up for hyperlipidemia in the setting of cad and cabg  risks benefits alternatives were discussed with the patient all questions were answered to his satisfaction.  [EKG obtained to assist in diagnosis and management of assessed problem(s)] : EKG obtained to assist in diagnosis and management of assessed problem(s)

## 2024-02-26 NOTE — PHYSICAL EXAM
[Well Developed] : well developed [Well Nourished] : well nourished [Normal Conjunctiva] : normal conjunctiva [No Acute Distress] : no acute distress [No Carotid Bruit] : no carotid bruit [Normal Venous Pressure] : normal venous pressure [No Murmur] : no murmur [Normal S1, S2] : normal S1, S2 [No Rub] : no rub [No Gallop] : no gallop [Clear Lung Fields] : clear lung fields [Good Air Entry] : good air entry [No Respiratory Distress] : no respiratory distress  [Non Tender] : non-tender [Soft] : abdomen soft [Normal Bowel Sounds] : normal bowel sounds [No Masses/organomegaly] : no masses/organomegaly [No Edema] : no edema [Normal Gait] : normal gait [No Cyanosis] : no cyanosis [No Varicosities] : no varicosities [No Clubbing] : no clubbing [No Rash] : no rash [No Skin Lesions] : no skin lesions [Moves all extremities] : moves all extremities [No Focal Deficits] : no focal deficits [Normal Speech] : normal speech [Alert and Oriented] : alert and oriented [Normal memory] : normal memory

## 2024-03-01 LAB
ALBUMIN SERPL ELPH-MCNC: 4.4 G/DL
ALP BLD-CCNC: 141 U/L
ALT SERPL-CCNC: 19 U/L
ANION GAP SERPL CALC-SCNC: 10 MMOL/L
AST SERPL-CCNC: 20 U/L
BILIRUB SERPL-MCNC: 0.4 MG/DL
BUN SERPL-MCNC: 16 MG/DL
CALCIUM SERPL-MCNC: 9 MG/DL
CHLORIDE SERPL-SCNC: 106 MMOL/L
CHOLEST SERPL-MCNC: 112 MG/DL
CO2 SERPL-SCNC: 25 MMOL/L
CREAT SERPL-MCNC: 1.06 MG/DL
EGFR: 73 ML/MIN/1.73M2
ESTIMATED AVERAGE GLUCOSE: 120 MG/DL
GLUCOSE SERPL-MCNC: 114 MG/DL
HBA1C MFR BLD HPLC: 5.8 %
HDLC SERPL-MCNC: 42 MG/DL
LDLC SERPL CALC-MCNC: 51 MG/DL
NONHDLC SERPL-MCNC: 70 MG/DL
POTASSIUM SERPL-SCNC: 4.5 MMOL/L
PROT SERPL-MCNC: 6.7 G/DL
SODIUM SERPL-SCNC: 141 MMOL/L
TRIGL SERPL-MCNC: 103 MG/DL

## 2024-04-03 ENCOUNTER — RX RENEWAL (OUTPATIENT)
Age: 74
End: 2024-04-03

## 2024-04-03 RX ORDER — LABETALOL HYDROCHLORIDE 100 MG/1
100 TABLET, FILM COATED ORAL
Qty: 180 | Refills: 1 | Status: ACTIVE | COMMUNITY
Start: 2023-05-11 | End: 1900-01-01

## 2024-04-23 ENCOUNTER — APPOINTMENT (OUTPATIENT)
Dept: UROLOGY | Facility: CLINIC | Age: 74
End: 2024-04-23
Payer: MEDICARE

## 2024-04-23 ENCOUNTER — APPOINTMENT (OUTPATIENT)
Dept: UROLOGY | Facility: CLINIC | Age: 74
End: 2024-04-23

## 2024-04-23 VITALS
BODY MASS INDEX: 26.92 KG/M2 | HEIGHT: 70 IN | OXYGEN SATURATION: 99 % | TEMPERATURE: 98.1 F | DIASTOLIC BLOOD PRESSURE: 70 MMHG | SYSTOLIC BLOOD PRESSURE: 126 MMHG | WEIGHT: 188 LBS | HEART RATE: 86 BPM

## 2024-04-23 DIAGNOSIS — N13.8 BENIGN PROSTATIC HYPERPLASIA WITH LOWER URINARY TRACT SYMPMS: ICD-10-CM

## 2024-04-23 DIAGNOSIS — N40.1 BENIGN PROSTATIC HYPERPLASIA WITH LOWER URINARY TRACT SYMPMS: ICD-10-CM

## 2024-04-23 DIAGNOSIS — R97.20 ELEVATED PROSTATE, SPECIFIC ANTIGEN [PSA]: ICD-10-CM

## 2024-04-23 PROCEDURE — 51798 US URINE CAPACITY MEASURE: CPT

## 2024-04-23 PROCEDURE — 51741 ELECTRO-UROFLOWMETRY FIRST: CPT

## 2024-04-23 PROCEDURE — 99214 OFFICE O/P EST MOD 30 MIN: CPT

## 2024-04-23 NOTE — HISTORY OF PRESENT ILLNESS
[Nocturia] : nocturia [FreeTextEntry1] : Mr. LEONG is a 72 year  White  M who comes today to clinic for elevated PSA to 4.5 referred by Dr. Reyes. s/p Triple CABG. chronic Wong catheter with urinary retention. mpMRI: Multiple linear/wedge-shaped areas of T2 hypointense signal, likely inflammatory. PIRADS 2 - Low (clinically significant cancer is unlikely to be present) Prostate Volume: 75 mL PSA density: 0.06 ng/mL/mL Last PSA 4.47> 5.7 (while retaining urine)  4/23/2024 Status post TURP December 13th, 2023.  Urine clear asymptomatic.  Symptoms remarkably improved.  Already discontinued BPH medications. Path: 10 g, benign. Denies fever chills hematuria.  PVR 1: 300 cc, PVR 2: 150 cc.  Uroflow: Low volume.

## 2024-04-23 NOTE — ASSESSMENT
[FreeTextEntry1] : Mr. LEONG is a 72 year White M who comes today to clinic for elevated PSA to 5.18>4.47 referred by Dr. Reyes. mpMRI with diffuse inflammation PIRADS 2.  Prostate 75 cc.  Patient found to be retaining more than 600 cc of urine despite tamsulosin 0.8 mg. Status post TURP 1 month ago.  Doing well. Path: 10 g, benign. Discussed PSA today, follow-up in 6 months for uroflow PSA PVR IPSS.  The patient understands that PSA is a marker of cancer risk but does not diagnose cancer. He also understands that there are a number of benign conditions including UTI, BPH, certain activities and prostatitis that will increase PSA in the absence of cancer. Unfortunately, the only way to definitively diagnose cancer is with a prostate biopsy. We discussed the method of performing prostate and the risks (bleeding, infection, urinary retention, ED). In addition to this, the patient and I discussed the discrepancy between the prevalence of prostate cancer and the prevalence of death from prostate cancer.  Prostate cancer is the most common solid tumor in American men. However, we discussed that it can be very slow growing, many men with prostate cancer will die with the disease rather than from it.

## 2024-04-24 LAB
PSA FREE FLD-MCNC: 21 %
PSA FREE SERPL-MCNC: 0.64 NG/ML
PSA SERPL-MCNC: 3.08 NG/ML

## 2024-06-06 NOTE — H&P PST ADULT - BMI (KG/M2)
[facility] ([facility]  Surgery Department  Operative Note    SUMMARY     Patient: Estela Vázquez    Medical Record: [id number]    Date of Procedure: 6/6/2024     Surgeon: Surgeons and Role:     * Gumaro Murphy Jr., MD - Primary    Assisting Surgeon: None    Pre-Operative Diagnosis: Gallbladder polyp [K82.4]    Post-Operative Diagnosis: Post-Op Diagnosis Codes:     * Gallbladder polyp [K82.4]    Procedure: Procedure(s) (LRB):  CHOLECYSTECTOMY, LAPAROSCOPIC (N/A)  LYSIS, ADHESIONS, LAPAROSCOPIC (N/A)    Procedure in Detail:  The patient has had multiple previous abdominal procedures for endometriosis.  Additionally, the patient had an abdominal plasty.    A small puncture was made in the left upper quadrant, a Veress needle inserted, a pneumoperitoneum achieved.  A 5 mm Optiview trocar inserted in the midclavicular line of the right upper quadrant.  It became apparent that the patient had significant adhesions present in the upper abdomen.  The trocar passed through the omentum which was densely adherent to the abdominal wall.  At this point it was obvious that enterolysis was necessary in order to perform a laparoscopic procedure.  A 2nd 5 mm Optiview trocar was inserted in the right side of the abdomen at the level of the umbilicus.  A 3rd 5 mm trocar inserted under direct observation in the left upper quadrant.  Using the harmonic scalpel the omentum was taken down from the abdominal wall.  This portion of the procedure took approximately 40 minutes.  A 4th trocar was then inserted in the anterior axillary line of the right upper quadrant.  At this point the gallbladder was clearly visible .  The gallbladder was grasped with ratcheted retractors placed through the lateral 5 mm ports.  There were omental adhesions present to the gallbladder.  The gallbladder was grasped with ratcheted retractors placed through the lateral 5 mm ports.  Using blunt dissection adhesions to the gallbladder were taken down  exposing the cystic artery and duct.  These were doubly clamped proximally and then transected.  The gallbladder removed from the liver bed with the aid of electrocautery Bovie.  Under direct observation the gallbladder was removed through the lateral 5 mm port.  The peritoneal cavity irrigated and then inspected.  The pneumoperitoneum released.  The skin incisions closed with 4-0 Monocryl and the wounds sterilely dressed.  The patient tolerated the procedure well and left the operating room in good condition.  At the end of procedure all sponge, lap and instrument counts were correct.  Estimated blood loss-minimal   27.4

## 2024-08-07 ENCOUNTER — APPOINTMENT (OUTPATIENT)
Dept: INTERNAL MEDICINE | Facility: CLINIC | Age: 74
End: 2024-08-07

## 2024-08-07 PROBLEM — Z23 NEED FOR PNEUMOCOCCAL VACCINATION: Status: ACTIVE | Noted: 2024-08-07

## 2024-08-07 PROCEDURE — G0439: CPT

## 2024-08-07 NOTE — HEALTH RISK ASSESSMENT
[Very Good] : ~his/her~  mood as very good [No] : In the past 12 months have you used drugs other than those required for medical reasons? No [No falls in past year] : Patient reported no falls in the past year [0] : 2) Feeling down, depressed, or hopeless: Not at all (0) [PHQ-2 Negative - No further assessment needed] : PHQ-2 Negative - No further assessment needed [Never] : Never [NO] : No [HIV test declined] : HIV test declined [Hepatitis C test declined] : Hepatitis C test declined [None] : None [With Significant Other] : lives with significant other [Retired] : retired [] :  [Feels Safe at Home] : Feels safe at home [Fully functional (bathing, dressing, toileting, transferring, walking, feeding)] : Fully functional (bathing, dressing, toileting, transferring, walking, feeding) [Fully functional (using the telephone, shopping, preparing meals, housekeeping, doing laundry, using] : Fully functional and needs no help or supervision to perform IADLs (using the telephone, shopping, preparing meals, housekeeping, doing laundry, using transportation, managing medications and managing finances) [de-identified] : gym every morning [de-identified] : good [[id number]] : 0 [Change in mental status noted] : No change in mental status noted [Language] : denies difficulty with language [Behavior] : denies difficulty with behavior [Learning/Retaining New Information] : denies difficulty learning/retaining new information [Handling Complex Tasks] : denies difficulty handling complex tasks [Reasoning] : denies difficulty with reasoning [Spatial Ability and Orientation] : denies difficulty with spatial ability and orientation [Sexually Active] : not sexually active [Reports changes in hearing] : Reports no changes in hearing [Reports changes in vision] : Reports no changes in vision [Reports changes in dental health] : Reports no changes in dental health

## 2024-08-08 ENCOUNTER — RX RENEWAL (OUTPATIENT)
Age: 74
End: 2024-08-08

## 2024-08-27 ENCOUNTER — NON-APPOINTMENT (OUTPATIENT)
Age: 74
End: 2024-08-27

## 2024-08-27 ENCOUNTER — APPOINTMENT (OUTPATIENT)
Dept: CARDIOLOGY | Facility: CLINIC | Age: 74
End: 2024-08-27
Payer: MEDICARE

## 2024-08-27 VITALS
BODY MASS INDEX: 28.63 KG/M2 | SYSTOLIC BLOOD PRESSURE: 131 MMHG | OXYGEN SATURATION: 96 % | HEIGHT: 70 IN | WEIGHT: 200 LBS | DIASTOLIC BLOOD PRESSURE: 70 MMHG | HEART RATE: 72 BPM

## 2024-08-27 PROCEDURE — 99214 OFFICE O/P EST MOD 30 MIN: CPT

## 2024-08-27 PROCEDURE — 93306 TTE W/DOPPLER COMPLETE: CPT

## 2024-08-27 PROCEDURE — 93000 ELECTROCARDIOGRAM COMPLETE: CPT

## 2024-08-27 NOTE — ASSESSMENT
[FreeTextEntry1] : Without evidence or recent infarction overt heart failure or unstable angina and based upon a satisfactory stress test may undergo planned TURP which constitutes low risk surgery in a patient with intermediate cardiac risk at an ASA class II or III anesthesia risk. Aspirin is discretionary absent a cardiac stent and may be held for 7 days prior to planned surgery..   care coordinated with dr Jose Luis villalobos discussed with patient.  8/27/2024 Echocardiogram suggested in view of dyspnea on exertion and history of right ventricular enlargement prior x-rays have been unrevealing.   Medical necessity High risk encounter based upon 2 or more chronic conditions and a new complaint of dyspnea

## 2024-08-27 NOTE — REASON FOR VISIT
[CV Risk Factors and Non-Cardiac Disease] : CV risk factors and non-cardiac disease [Hyperlipidemia] : hyperlipidemia [Coronary Artery Disease] : coronary artery disease [FreeTextEntry1] : On ramipril and atorvastatin and aspirin no longer on a beta-blocker inquires about the role of antibiotics choose to institute for other dental reasons no history of valve surgery or endocarditis for dental work not indicated from a cardiac standpoint although dentist may no history of valve surgery or endocarditis  7/13/23 cardiac status stable,   At 10/11/2023 Marcel feels fine request renewal of medications no history of smoking although may have had passive inhalation from parents were heavy smokers repeat chest x-ray given a left pleural effusion most likely postoperative consider pulmonary evaluation Dr. Trinidad if symptoms or abnormal chest x-ray noted return visit 4 months fasting blood work  2/16/14  mostly undergoing urologic evaluations repeat lipids requested for target ldl 50   8/27/24 marcel notes dyspnea on exertion. we have reviewed a prior echo in 2018 and note RV enlargement. he is a non smoker. has some tingling in his hands and "charley horses " in his legs. he sees dr Henry with sonography

## 2024-08-27 NOTE — HISTORY OF PRESENT ILLNESS
[Preoperative Visit] : for a medical evaluation prior to surgery [Scheduled Procedure ___] : a [unfilled] [Date of Surgery ___] : on [unfilled] [Surgeon Name ___] : surgeon: [unfilled] [FreeTextEntry1] :  ANYA LEONG comes today for evalution. he  was advised to undergo a complete cardiac evaluation. He denies chest pains shortness of breath or loss of consciousnes.

## 2024-08-27 NOTE — DISCUSSION/SUMMARY
[FreeTextEntry1] : I have asked  Jt  to undergo detailed cardiac testing in order to evaluate her overall cardiovascular risk. An assessment of both structural and functional heart disease was recommended to the patient. In this regard, an echocardiogram and a stress test were advised to the patient. I await the upcoming noninvasive cardiac testing in order to assess her overall cardiovascular risk. We discussed the pros and cons of plain treadmill stress testing nuclear stress testing and angiography including a sensitivity analysis.We discussed current ACC guidelines and the calculated 10 year risk is approximately   48% which is severely elevated. More than half of the face to face encounter of 60 minutes   was spent in counseling the patient with respect to  cardiovascular risk. we suspect possible chronic pulmonary disorder given prior right ventricular enlargement on 2018 Quality measures  Tobacco intervention not indicated Statin for prevention of cardiovascular disease indicated Hypertension compensated Aspirin for ischemic vascular disease not indicated Tobacco screening cessation and intervention not indicated  Medical necessity This is a high encounter based upon two or more chronic illnesses with mild exacerbation requiring further management and evaluation.  After careful review of the chart and recommendations after recent pulseless electrical activity.  EKG is indicated for evaluation of shortness of breath  Update 3/31/2023 A long discussion with Jt with respect to recent open heart surgery a pulmonary evaluation with Dr. Weiss and is suggested given axis deviation and physical findings of rhonchi.  We will continue to withhold spironolactone and furosemide given recent pulseless electrical activity which may have promoted dehydration  5/11/2023 Transition to labetalol secondary risk reduction observe for signs and symptoms progressive ambulation blood pressure and cholesterol targets discussed  7/13/23 Medications renewed   10/11/2023 Repeat chest x-ray is requested  2/16/24  repeat lipids are satisfactory. would continue current course  Medical necessity  this is a intermediate risk encounter based upon follow-up for hyperlipidemia in the setting of cad and cabg  risks benefits alternatives were discussed with the patient all questions were answered to his satisfaction.

## 2024-08-27 NOTE — CARDIOLOGY SUMMARY
[de-identified] : 1/18/23 sinus rhythm rightward axis 10/21/23 rsr right axis [de-identified] : 1/25/18 stage 3, 8 minutes [de-identified] : 11/17/18 moderately enlarge right ventricle, mild Pulmonic valve regurgitation ejection fraction 55 to 60%\par  3/9/2023 normal left ventricular function postoperative septal motion [de-identified] : 3/7/2023 open heart surgery Dr. Torres

## 2024-11-26 ENCOUNTER — APPOINTMENT (OUTPATIENT)
Dept: UROLOGY | Facility: CLINIC | Age: 74
End: 2024-11-26
Payer: MEDICARE

## 2024-11-26 VITALS
TEMPERATURE: 98.2 F | DIASTOLIC BLOOD PRESSURE: 74 MMHG | RESPIRATION RATE: 17 BRPM | BODY MASS INDEX: 27.92 KG/M2 | HEART RATE: 67 BPM | SYSTOLIC BLOOD PRESSURE: 128 MMHG | WEIGHT: 195 LBS | HEIGHT: 70 IN | OXYGEN SATURATION: 97 %

## 2024-11-26 DIAGNOSIS — N13.8 BENIGN PROSTATIC HYPERPLASIA WITH LOWER URINARY TRACT SYMPMS: ICD-10-CM

## 2024-11-26 DIAGNOSIS — R97.20 ELEVATED PROSTATE, SPECIFIC ANTIGEN [PSA]: ICD-10-CM

## 2024-11-26 DIAGNOSIS — N40.1 BENIGN PROSTATIC HYPERPLASIA WITH LOWER URINARY TRACT SYMPMS: ICD-10-CM

## 2024-11-26 PROCEDURE — 51741 ELECTRO-UROFLOWMETRY FIRST: CPT

## 2024-11-26 PROCEDURE — 99214 OFFICE O/P EST MOD 30 MIN: CPT

## 2024-11-26 PROCEDURE — 51798 US URINE CAPACITY MEASURE: CPT

## 2024-11-27 LAB
PSA FREE FLD-MCNC: 21 %
PSA FREE SERPL-MCNC: 0.54 NG/ML
PSA SERPL-MCNC: 2.58 NG/ML

## 2025-01-09 ENCOUNTER — APPOINTMENT (OUTPATIENT)
Dept: INTERNAL MEDICINE | Facility: CLINIC | Age: 75
End: 2025-01-09
Payer: MEDICARE

## 2025-01-09 VITALS
HEART RATE: 83 BPM | BODY MASS INDEX: 28.63 KG/M2 | TEMPERATURE: 97.2 F | SYSTOLIC BLOOD PRESSURE: 140 MMHG | DIASTOLIC BLOOD PRESSURE: 70 MMHG | RESPIRATION RATE: 16 BRPM | OXYGEN SATURATION: 98 % | HEIGHT: 70 IN | WEIGHT: 200 LBS

## 2025-01-09 DIAGNOSIS — R05.9 COUGH, UNSPECIFIED: ICD-10-CM

## 2025-01-09 PROCEDURE — 99213 OFFICE O/P EST LOW 20 MIN: CPT

## 2025-01-09 RX ORDER — FLUTICASONE PROPIONATE 50 UG/1
50 SPRAY, METERED NASAL TWICE DAILY
Qty: 1 | Refills: 2 | Status: ACTIVE | COMMUNITY
Start: 2025-01-09 | End: 1900-01-01

## 2025-01-09 RX ORDER — BENZONATATE 200 MG/1
200 CAPSULE ORAL
Qty: 30 | Refills: 0 | Status: ACTIVE | COMMUNITY
Start: 2025-01-09 | End: 1900-01-01

## 2025-02-11 ENCOUNTER — RX RENEWAL (OUTPATIENT)
Age: 75
End: 2025-02-11

## 2025-05-16 ENCOUNTER — RX RENEWAL (OUTPATIENT)
Age: 75
End: 2025-05-16

## 2025-05-20 ENCOUNTER — APPOINTMENT (OUTPATIENT)
Dept: UROLOGY | Facility: CLINIC | Age: 75
End: 2025-05-20
Payer: MEDICARE

## 2025-05-20 VITALS
DIASTOLIC BLOOD PRESSURE: 75 MMHG | SYSTOLIC BLOOD PRESSURE: 125 MMHG | HEART RATE: 80 BPM | HEIGHT: 70 IN | BODY MASS INDEX: 28.63 KG/M2 | WEIGHT: 200 LBS | OXYGEN SATURATION: 99 % | TEMPERATURE: 97.5 F

## 2025-05-20 DIAGNOSIS — N13.8 BENIGN PROSTATIC HYPERPLASIA WITH LOWER URINARY TRACT SYMPMS: ICD-10-CM

## 2025-05-20 DIAGNOSIS — R97.20 ELEVATED PROSTATE, SPECIFIC ANTIGEN [PSA]: ICD-10-CM

## 2025-05-20 DIAGNOSIS — K40.90 UNILATERAL INGUINAL HERNIA, W/OUT OBSTRUCTION OR GANGRENE, NOT SPECIFIED AS RECURRENT: ICD-10-CM

## 2025-05-20 DIAGNOSIS — N40.1 BENIGN PROSTATIC HYPERPLASIA WITH LOWER URINARY TRACT SYMPMS: ICD-10-CM

## 2025-05-20 PROCEDURE — 99214 OFFICE O/P EST MOD 30 MIN: CPT

## 2025-05-20 PROCEDURE — 51798 US URINE CAPACITY MEASURE: CPT

## 2025-06-23 ENCOUNTER — APPOINTMENT (OUTPATIENT)
Dept: ULTRASOUND IMAGING | Facility: HOSPITAL | Age: 75
End: 2025-06-23
Payer: MEDICARE

## 2025-06-23 ENCOUNTER — NON-APPOINTMENT (OUTPATIENT)
Age: 75
End: 2025-06-23

## 2025-06-23 ENCOUNTER — OUTPATIENT (OUTPATIENT)
Dept: OUTPATIENT SERVICES | Facility: HOSPITAL | Age: 75
LOS: 1 days | End: 2025-06-23
Payer: MEDICARE

## 2025-06-23 DIAGNOSIS — Z90.49 ACQUIRED ABSENCE OF OTHER SPECIFIED PARTS OF DIGESTIVE TRACT: Chronic | ICD-10-CM

## 2025-06-23 DIAGNOSIS — Z96.641 PRESENCE OF RIGHT ARTIFICIAL HIP JOINT: Chronic | ICD-10-CM

## 2025-06-23 DIAGNOSIS — Z96.642 PRESENCE OF LEFT ARTIFICIAL HIP JOINT: Chronic | ICD-10-CM

## 2025-06-23 DIAGNOSIS — H33.053 TOTAL RETINAL DETACHMENT, BILATERAL: Chronic | ICD-10-CM

## 2025-06-23 DIAGNOSIS — K40.90 UNILATERAL INGUINAL HERNIA, WITHOUT OBSTRUCTION OR GANGRENE, NOT SPECIFIED AS RECURRENT: ICD-10-CM

## 2025-06-23 PROCEDURE — 93975 VASCULAR STUDY: CPT | Mod: 26

## 2025-06-23 PROCEDURE — 93975 VASCULAR STUDY: CPT

## 2025-06-24 ENCOUNTER — APPOINTMENT (OUTPATIENT)
Age: 75
End: 2025-06-24
Payer: MEDICARE

## 2025-06-24 VITALS
DIASTOLIC BLOOD PRESSURE: 73 MMHG | TEMPERATURE: 97.5 F | RESPIRATION RATE: 16 BRPM | WEIGHT: 196 LBS | SYSTOLIC BLOOD PRESSURE: 143 MMHG | HEIGHT: 70 IN | HEART RATE: 76 BPM | BODY MASS INDEX: 28.06 KG/M2 | OXYGEN SATURATION: 98 %

## 2025-06-24 PROCEDURE — 99214 OFFICE O/P EST MOD 30 MIN: CPT

## 2025-07-01 ENCOUNTER — NON-APPOINTMENT (OUTPATIENT)
Age: 75
End: 2025-07-01

## 2025-07-01 NOTE — H&P ADULT - PROBLEM/PLAN-4
The patient's goals for the shift include      The clinical goals for the shift include Pt. will remain free from fall/injury throughout shift    Over the shift, the patient did not make progress toward the following goals. Barriers to progression include. Recommendations to address these barriers include.    Problem: Safety - Adult  Goal: Free from fall injury  Outcome: Progressing     Problem: Fall/Injury  Goal: Not fall by end of shift  Outcome: Progressing  Goal: Be free from injury by end of the shift  Outcome: Progressing  Goal: Verbalize understanding of personal risk factors for fall in the hospital  Outcome: Progressing  Goal: Verbalize understanding of risk factor reduction measures to prevent injury from fall in the home  Outcome: Progressing  Goal: Use assistive devices by end of the shift  Outcome: Progressing  Goal: Pace activities to prevent fatigue by end of the shift  Outcome: Progressing      DISPLAY PLAN FREE TEXT

## 2025-08-25 ENCOUNTER — RX RENEWAL (OUTPATIENT)
Age: 75
End: 2025-08-25

## 2025-09-08 ENCOUNTER — TRANSCRIPTION ENCOUNTER (OUTPATIENT)
Age: 75
End: 2025-09-08

## (undated) DEVICE — DRAPE TOWEL BLUE 17" X 24"

## (undated) DEVICE — GOWN XXXL

## (undated) DEVICE — SUT BLUNT SZ 5

## (undated) DEVICE — SUT BIOSYN 4-0 18" P-12

## (undated) DEVICE — CONNECTOR "Y" 1/2 X 3/8 X 3/8"

## (undated) DEVICE — STEALTH CLAMP INSERT FIBRA/FIBRA 60MM

## (undated) DEVICE — AORTIC PUNCH 5MM STANDARD HANDLE

## (undated) DEVICE — VENODYNE/SCD SLEEVE CALF LARGE

## (undated) DEVICE — GLV 6.5 PROTEXIS (WHITE)

## (undated) DEVICE — PHRENIC NERVE PAD MEDIUM

## (undated) DEVICE — SUT POLYSORB 0 36" GS-25 UNDYED

## (undated) DEVICE — SUT POLYSORB 4-0 30" CVF-23

## (undated) DEVICE — VENODYNE/SCD SLEEVE CALF MEDIUM

## (undated) DEVICE — BLADE SCALPEL SAFETYLOCK #15

## (undated) DEVICE — DRSG OPSITE 2.5 X 2"

## (undated) DEVICE — SUT PROLENE 7-0 24" BV175-6

## (undated) DEVICE — SUCTION YANKAUER BULBOUS TIP NO VENT

## (undated) DEVICE — CONNECTOR STRAIGHT 3/8 X 1/2"

## (undated) DEVICE — TUBING SUCTION 20FT

## (undated) DEVICE — SOL NORMOSOL-R PH7.4 1000ML

## (undated) DEVICE — STEALTH CLAMP INSERT FIBRA/FIBRA 90MM

## (undated) DEVICE — GLV 7.5 PROTEXIS (WHITE)

## (undated) DEVICE — DRSG VAC GRANUFOAM LARGE (BLACK)

## (undated) DEVICE — SUT PROLENE 7-0 4-24" BV-1

## (undated) DEVICE — DRSG VAC GRANUFOAM MEDIUM (BLACK)

## (undated) DEVICE — SUT PROLENE 8-0 24" BV175-6

## (undated) DEVICE — SYR LUER LOK 30CC

## (undated) DEVICE — PACK CARDIAC YELLOW

## (undated) DEVICE — BEAVER BLADE MINI SHARP ALL ROUND (BLUE)

## (undated) DEVICE — DRSG KLING 6"

## (undated) DEVICE — SUT DOUBLE 6 WIRE STERNAL

## (undated) DEVICE — Device

## (undated) DEVICE — FEEDING TUBE NG SUMP 16FR 48"

## (undated) DEVICE — WARMING BLANKET FULL ADULT

## (undated) DEVICE — DRSG TEGADERM 6"X8"

## (undated) DEVICE — DRAPE 1/2 SHEET 40X57"

## (undated) DEVICE — SYNOVIS VASCULAR PROBE 1.5MM 15CM

## (undated) DEVICE — SUCTION CATH ARGYLE WHISTLE TIP 14FR STRAIGHT PACKED

## (undated) DEVICE — SOL IRR POUR H2O 1000ML

## (undated) DEVICE — TUBING IV SET MICROCLAVE ADAPTER

## (undated) DEVICE — CANISTER W/GEL INFOVAC 500ML

## (undated) DEVICE — SOL IRR BAG NS 0.9% 3000ML

## (undated) DEVICE — DRSG DERMABOND PRINEO 60CM

## (undated) DEVICE — SUT POLYSORB 2 30" GS-26

## (undated) DEVICE — TUBING TUR 2 PRONG

## (undated) DEVICE — SUMP PERICARDIAL 20FR 1/4" ADULT

## (undated) DEVICE — PACK UNIVERSAL CARDIAC

## (undated) DEVICE — GLV 8.5 PROTEXIS (WHITE)

## (undated) DEVICE — SUT SOFSILK 0 30" V-20

## (undated) DEVICE — SUT PROLENE 6-0 4-30" C-1

## (undated) DEVICE — SUT STAINLESS STEEL 5 18" CCS

## (undated) DEVICE — FOLEY CATH 3-WAY 22FR 30CC LATEX HEMATURIA

## (undated) DEVICE — GOWN TRIMAX LG

## (undated) DEVICE — PACK CUSTOM W/INSPIRE OXYGENATOR

## (undated) DEVICE — MARKING PEN W RULER

## (undated) DEVICE — SUT SURGICAL STEEL 6 30" BP-1

## (undated) DEVICE — DRSG PREVENA PEEL & PLACE KIT 20CM

## (undated) DEVICE — DRAPE MAYO STAND 30"

## (undated) DEVICE — PACK CYSTO

## (undated) DEVICE — TUBING TUR 4 PRONG

## (undated) DEVICE — SUCTION YANKAUER NO CONTROL VENT

## (undated) DEVICE — GLV 8 PROTEXIS ORTHO (CREAM)

## (undated) DEVICE — GLV 7 PROTEXIS (WHITE)

## (undated) DEVICE — POSITIONER CARDIAC BUMP

## (undated) DEVICE — BLADE SCALPEL SAFETYLOCK #10

## (undated) DEVICE — MEDICATION LABELS W MARKER

## (undated) DEVICE — AORTIC PUNCH 4.0MM LONG LENGTH HANDLE

## (undated) DEVICE — SENSOR MYOCARDIAL TEMP 15MM

## (undated) DEVICE — SOL IRR POUR H2O 250ML

## (undated) DEVICE — NDL HYPO SAFE 18G X 1.5" (PINK)

## (undated) DEVICE — ELCTR BOVIE TIP BLADE VALLEYLAB 6.5"

## (undated) DEVICE — DRSG TELFA 3 X 8

## (undated) DEVICE — FILTER REINFUSION FOR SALVAGED BLOOD DISP

## (undated) DEVICE — DRSG XEROFORM 1 X 8"

## (undated) DEVICE — DRAINAGE BAG URINARY 2L

## (undated) DEVICE — DRSG STOCKINETTE IMPERVIOUS XL

## (undated) DEVICE — SUT PROLENE 4-0 36" BB

## (undated) DEVICE — TOURNIQUET SET 12FR (1 RED, 1 BLUE, 1 SNARE) 7"

## (undated) DEVICE — DRSG SENSA TRAC SMALL

## (undated) DEVICE — FOLEY TRAY 16FR 5CC LF LUBRISIL ADVANCE TEMP CLOSED

## (undated) DEVICE — BLADE SCALPEL SAFETYLOCK #11

## (undated) DEVICE — SUT PROLENE 4-0 36" RB-1

## (undated) DEVICE — AORTIC PUNCH 4.0MM STANDARD HANDLE

## (undated) DEVICE — SYR LUER LOK 10CC

## (undated) DEVICE — SOL IRR POUR NS 0.9% 500ML

## (undated) DEVICE — TUBING TRUWAVE PRESSURE MALE/FEMALE 72"

## (undated) DEVICE — SUT SOFSILK 2 60" TIES

## (undated) DEVICE — FOLEY HOLDER STATLOCK 2 WAY ADULT

## (undated) DEVICE — DRAIN RESERVOIR FOR JACKSON PRATT 100CC CARDINAL

## (undated) DEVICE — CANISTER KCI 500ML GEL SENSA TRAC

## (undated) DEVICE — SYR LUER LOK 50CC

## (undated) DEVICE — NDL COUNTER FOAM AND MAGNET 40-70

## (undated) DEVICE — VESSEL LOOP MAXI-RED  0.120" X 16"

## (undated) DEVICE — SUT POLYSORB 2-0 30" GS-21 UNDYED

## (undated) DEVICE — SOL ANTI FOG

## (undated) DEVICE — DRAIN CHANNEL 19FR ROUND FULL FLUTED

## (undated) DEVICE — DRAIN JACKSON PRATT 10MM FLAT FULL NO TROCAR

## (undated) DEVICE — SUT SOFSILK 2-0 18" TIES

## (undated) DEVICE — WARMING BLANKET UPPER ADULT

## (undated) DEVICE — DRAPE LIGHT HANDLE COVER (BLUE)

## (undated) DEVICE — PACING CABLE (BROWN) A/V TEMP SCREW DOWN 12FT

## (undated) DEVICE — DRAIN JACKSON PRATT 7MM FLAT FULL NO TROCAR

## (undated) DEVICE — DRAPE 3/4 SHEET W REINFORCEMENT 56X77"

## (undated) DEVICE — POSITIONER FOAM EGG CRATE ULNAR 2PCS (PINK)

## (undated) DEVICE — WARMING BLANKET LOWER ADULT

## (undated) DEVICE — STRYKER INTERPULSE HANDPIECE W IRR SUCTION TUBE

## (undated) DEVICE — NDL BLUNT 18G LOOP VESSEL MAXI WHITE

## (undated) DEVICE — SUT SILK 0 30" TIES

## (undated) DEVICE — VENTING ADAPTER "Y" (RED/BLUE) 7.5"

## (undated) DEVICE — DRAPE INSTRUMENT POUCH 6.75" X 11"

## (undated) DEVICE — CHEST DRAIN PLEUR-EVAC WET/WET ADULT-PEDS SINGLE (QUICK)

## (undated) DEVICE — LAP PAD 18 X 18"

## (undated) DEVICE — WARMING BLANKET DUO-THERM HYPER/HYPOTHERM ADULT

## (undated) DEVICE — CANISTER W/GEL INFOVAC 1000ML

## (undated) DEVICE — UROVAC

## (undated) DEVICE — SUT STAINLESS STEEL 5 18" SCC

## (undated) DEVICE — GLV 8 PROTEXIS (WHITE)

## (undated) DEVICE — TUBING KIT FAST START ATF 40

## (undated) DEVICE — PREP CHLORAPREP HI-LITE ORANGE 26ML

## (undated) DEVICE — MULTIPLE PERFUSION SET FEMALE 1 INLET LEG W 4 LEGS 15" (BLUE/RED)

## (undated) DEVICE — FOLEY HOLDER STATLOCK 3 WAY ADULT

## (undated) DEVICE — DRSG VAC GRANUFOAM SMALL (BLACK)

## (undated) DEVICE — DRSG STERISTRIPS 0.5 X 4"

## (undated) DEVICE — SUT PROLENE 3-0 36" SH

## (undated) DEVICE — TUBING INSUFFLATION LAP FILTER 10FT

## (undated) DEVICE — SYR ASEPTO

## (undated) DEVICE — PACK MAJOR ABDOMINAL SUPINE

## (undated) DEVICE — PACK UNIVERSAL CARDIAC SUPPLEMNTAL B

## (undated) DEVICE — FOLEY TRAY 16FR 5CC LTX UMETER CLOSED

## (undated) DEVICE — SYR LUER LOK 1CC

## (undated) DEVICE — DRAPE DRAINAGE BAG URO CATCH II

## (undated) DEVICE — SUT MONOCRYL 4-0 18" PS-2

## (undated) DEVICE — WARMING BLANKET FULL UNDERBODY

## (undated) DEVICE — SUT VICRYL 0 36" CTX UNDYED

## (undated) DEVICE — DRSG OPSITE 13.75 X 4"

## (undated) DEVICE — SUT VICRYL 3-0 27" CT-1

## (undated) DEVICE — DRSG ACE BANDAGE 6"

## (undated) DEVICE — STAPLER SKIN VISI-STAT 35 WIDE

## (undated) DEVICE — DRAPE SLUSH / WARMER 44 X 66"

## (undated) DEVICE — TUBING ATS SUCTION LINE

## (undated) DEVICE — ELCTR CUTTING 22/24FR

## (undated) DEVICE — GOWN TRIMAX XXL

## (undated) DEVICE — SYR LUER LOK 20CC

## (undated) DEVICE — DRAPE IOBAN 23" X 23"

## (undated) DEVICE — ELCTR PLASMA LOOP MEDIUM ANGLED 24FR 12-30 DEG

## (undated) DEVICE — FLOORMAT SURGISAFE ABSORBANT WHITE 36" X 72"

## (undated) DEVICE — SPECIMEN CONTAINER 100ML

## (undated) DEVICE — CONNECTOR INTERSEPT "Y" 1/4 X 1/4 X 1/4"

## (undated) DEVICE — BULLDOG SPRING CLIP 6MM SOFT/SOFT

## (undated) DEVICE — SUT PLEDGET PRE PUNCH 4.8 X 9.5 X 1.5 MM

## (undated) DEVICE — SAW BLADE MICROAIRE STERNUM 1X34X9.4MM

## (undated) DEVICE — CABLE DAC ACTIVE CORD

## (undated) DEVICE — VISITEC 4X4

## (undated) DEVICE — SUT VICRYL 1 36" CTX UNDYED

## (undated) DEVICE — DRAIN CHANNEL 32FR ROUND HUBLESS FULL FLUTED

## (undated) DEVICE — TRAP SPECIMEN SPUTUM 40CC

## (undated) DEVICE — GETINGE VASOVIEW 7 ENDOSCOPIC VESSEL HARVESTING SYSTEM

## (undated) DEVICE — SUT TICRON 5 30" KV-40

## (undated) DEVICE — SYS VEIN HARVESTING VIRTUOSAPH PLUS W/ RADIAL